# Patient Record
Sex: FEMALE | Race: WHITE | Employment: OTHER | URBAN - NONMETROPOLITAN AREA
[De-identification: names, ages, dates, MRNs, and addresses within clinical notes are randomized per-mention and may not be internally consistent; named-entity substitution may affect disease eponyms.]

---

## 2017-06-01 PROBLEM — R91.8 PULMONARY NODULES: Status: ACTIVE | Noted: 2017-06-01

## 2018-03-06 ENCOUNTER — OFFICE VISIT (OUTPATIENT)
Dept: ORTHOPEDIC SURGERY | Age: 68
End: 2018-03-06

## 2018-03-06 VITALS — HEIGHT: 62 IN | WEIGHT: 100.09 LBS | BODY MASS INDEX: 18.42 KG/M2

## 2018-03-06 DIAGNOSIS — S42.292K OTHER CLOSED DISPLACED FRACTURE OF PROXIMAL END OF LEFT HUMERUS WITH NONUNION, SUBSEQUENT ENCOUNTER: ICD-10-CM

## 2018-03-06 DIAGNOSIS — S42.492A OTHER CLOSED DISPLACED FRACTURE OF DISTAL END OF LEFT HUMERUS, INITIAL ENCOUNTER: Primary | ICD-10-CM

## 2018-03-06 PROBLEM — S42.402A CLOSED FRACTURE OF LEFT DISTAL HUMERUS: Status: ACTIVE | Noted: 2018-03-06

## 2018-03-06 PROBLEM — S42.202A CLOSED FRACTURE OF LEFT PROXIMAL HUMERUS: Status: ACTIVE | Noted: 2018-03-06

## 2018-03-06 PROCEDURE — G8400 PT W/DXA NO RESULTS DOC: HCPCS | Performed by: ORTHOPAEDIC SURGERY

## 2018-03-06 PROCEDURE — 4040F PNEUMOC VAC/ADMIN/RCVD: CPT | Performed by: ORTHOPAEDIC SURGERY

## 2018-03-06 PROCEDURE — 1090F PRES/ABSN URINE INCON ASSESS: CPT | Performed by: ORTHOPAEDIC SURGERY

## 2018-03-06 PROCEDURE — G8427 DOCREV CUR MEDS BY ELIG CLIN: HCPCS | Performed by: ORTHOPAEDIC SURGERY

## 2018-03-06 PROCEDURE — 1123F ACP DISCUSS/DSCN MKR DOCD: CPT | Performed by: ORTHOPAEDIC SURGERY

## 2018-03-06 PROCEDURE — 99203 OFFICE O/P NEW LOW 30 MIN: CPT | Performed by: ORTHOPAEDIC SURGERY

## 2018-03-06 PROCEDURE — G8484 FLU IMMUNIZE NO ADMIN: HCPCS | Performed by: ORTHOPAEDIC SURGERY

## 2018-03-06 PROCEDURE — 4004F PT TOBACCO SCREEN RCVD TLK: CPT | Performed by: ORTHOPAEDIC SURGERY

## 2018-03-06 PROCEDURE — 3017F COLORECTAL CA SCREEN DOC REV: CPT | Performed by: ORTHOPAEDIC SURGERY

## 2018-03-06 PROCEDURE — L3660 SO 8 AB RSTR CAN/WEB PRE OTS: HCPCS | Performed by: ORTHOPAEDIC SURGERY

## 2018-03-06 PROCEDURE — G8419 CALC BMI OUT NRM PARAM NOF/U: HCPCS | Performed by: ORTHOPAEDIC SURGERY

## 2018-03-06 PROCEDURE — L3980 UP EXT FX ORTHOS HUMERAL NOS: HCPCS | Performed by: ORTHOPAEDIC SURGERY

## 2018-03-06 PROCEDURE — 3014F SCREEN MAMMO DOC REV: CPT | Performed by: ORTHOPAEDIC SURGERY

## 2018-03-06 NOTE — PROGRESS NOTES
Factors: Rest  Result of Injury: Yes  Work-Related Injury: No  Are there other pain locations you wish to document?: No    Medical History:  Past Medical History:   Diagnosis Date    C. difficile diarrhea     Depression     Dysphagia     ETOH abuse     May not have had a drink in a day or so.  Failure to thrive     Gastric ulcer     GERD (gastroesophageal reflux disease)     Thyroid disease      Past Surgical History:   Procedure Laterality Date    TUBAL LIGATION       Social History     Social History    Marital status:      Spouse name: N/A    Number of children: N/A    Years of education: N/A     Social History Main Topics    Smoking status: Current Every Day Smoker     Packs/day: 1.00     Years: 45.00     Types: Cigarettes    Smokeless tobacco: Never Used    Alcohol use 3.6 oz/week     6 Shots of liquor per week    Drug use: No    Sexual activity: Not Asked      Comment: drinks 2-3 short glasses/day     Other Topics Concern    None     Social History Narrative    None     No Known Allergies    Review of Systems:  Constitutional: negative  Respiratory: negative  Cardiovascular: negative  Musculoskeletal:negative except for New Patient (Left Shoulder: DOI either 3/1 or 3/2 unsure, but slipped and fell from a standing position; seen in ER 3/3/2018  Displaced Spiral Shaft Fx of  humerus; increase swelling in hand )    Relevant review of systems reviewed and available in the patient's chart in media tab    Vital Signs:  Vitals:    03/06/18 1458   Weight: 100 lb 1.4 oz (45.4 kg)   Height: 5' 2.01\" (1.575 m)         General Exam:   Constitutional: Patient is adequately groomed with no evidence of malnutrition  Vascular: Examination reveals no swelling or calf tenderness. Peripheral pulses are palpable and 2+. Neurological: The patient has good coordination. There is no weakness or sensory deficit.     PHYSICAL EXAMINATION: Inspection of the left arm reveals warm, dry, intact skin, she does have superficial skin tears from her fall, there is no active bleeding, no evidence of open fracture. She has significant swelling into forearm and hand consistent with dependent swelling from injury. She has gross motion at the distal humerus and deformity at the proximal humerus consistent with chronic malunion. She has limited elbow flexion secondary to pain. She is able to give a thumbs up, okay sign, cross her fingers and has very weak extension at the wrist.  Sensation is intact median, ulnar and radial nerve distributions. Examination of the contralateral shoulder reveals no atrophy or deformity. The skin is warm and dry. Range of motion is within normal limits. There is no focal tenderness with palpation. Provocative SLAP, biceps tension, apprehension AC joint or rotator cuff tests are negative. Strength is graded 5/5 in all muscle groups. The distal neurovascular exam is grossly intact. Cervical spine: The skin is warm and dry. There is no swelling, warmth, or erythema. Range of motion is within normal limits. There is no paraspinal or spinous process tenderness. Spurling's sign is negative and did not produce shoulder pain. The distal neurovascular exam is grossly intact. Additional Comments: Sensation is intact to light touch throughout the median, ulnar and radial nerve distribution. Able to wiggle fingers, give thumbs up, A-OK and cross index and middle fingers. X-RAYS: 2 views of the left humerus are obtained and reviewed after application of Alberto splint, spiral comminuted distal humerus fracture is again visualized, overall no significant improvement in overall alignment. Chronic malunion the proximal humerus with 100% displacement of the humeral head noted. Assessment:  Left distal 3rd humeral shaft fracture, nonunion proximal humerus fracture    Impression:  Encounter Diagnoses   Name Primary?     Other closed displaced fracture of distal end of left humerus, Jj Cheungmiento Humeral Fracture Brace. The left Humerus will require stabilization / immobilization from this semi-rigid / rigid orthosis to improve their function. The orthosis will assist in protecting the affected area, provide functional support and facilitate healing. The patient was educated and fit by a healthcare professional with expert knowledge and specialization in brace application while under the direct supervision of the treating physician. Verbal and written instructions for the use of and application of this item were provided. They were instructed to contact the office immediately should the brace result in increased pain, decreased sensation, increased swelling or worsening of the condition.  Breg Shure Shoulder Sling     Patient was prescribed a Breg Shure Shoulder Immobilizer. The left shoulder will require stabilization / immobilization from this orthosis. The orthosis will assist in protecting the affected area, provide functional support and facilitate healing. The patient was educated and fit by a healthcare professional with expert knowledge and specialization in brace application while under the direct supervision of the treating physician. Verbal and written instructions for the use of and application of this item were provided. They were instructed to contact the office immediately should the brace result in increased pain, decreased sensation, increased swelling or worsening of the condition. Treatment Plan:  Had a lengthy discussion with the son and patient about the nature of her injury, with conservative treatment she may develop a malunion or nonunion, she will continue to have limited function throughout the left arm as she has had since her initial injury almost 10 years ago resulting in the proximal humerus fracture.   She is very low functioning, she has dementia from alcohol abuse as well as anorexia, from a surgical standpoint she is a very poor

## 2018-03-08 DIAGNOSIS — S42.342A CLOSED DISPLACED SPIRAL FRACTURE OF SHAFT OF LEFT HUMERUS, INITIAL ENCOUNTER: ICD-10-CM

## 2018-03-08 RX ORDER — HYDROCODONE BITARTRATE AND ACETAMINOPHEN 5; 325 MG/1; MG/1
1 TABLET ORAL EVERY 6 HOURS PRN
Qty: 28 TABLET | Refills: 0 | Status: SHIPPED | OUTPATIENT
Start: 2018-03-08 | End: 2018-03-15

## 2018-03-13 ENCOUNTER — OFFICE VISIT (OUTPATIENT)
Dept: ORTHOPEDIC SURGERY | Age: 68
End: 2018-03-13

## 2018-03-13 VITALS
HEIGHT: 62 IN | WEIGHT: 100 LBS | HEART RATE: 86 BPM | SYSTOLIC BLOOD PRESSURE: 130 MMHG | BODY MASS INDEX: 18.4 KG/M2 | DIASTOLIC BLOOD PRESSURE: 84 MMHG

## 2018-03-13 DIAGNOSIS — S42.292K OTHER CLOSED DISPLACED FRACTURE OF PROXIMAL END OF LEFT HUMERUS WITH NONUNION, SUBSEQUENT ENCOUNTER: ICD-10-CM

## 2018-03-13 DIAGNOSIS — S42.492D OTHER CLOSED DISPLACED FRACTURE OF DISTAL END OF LEFT HUMERUS WITH ROUTINE HEALING, SUBSEQUENT ENCOUNTER: ICD-10-CM

## 2018-03-13 DIAGNOSIS — M89.8X2 PAIN OF LEFT HUMERUS: Primary | ICD-10-CM

## 2018-03-13 PROCEDURE — 4004F PT TOBACCO SCREEN RCVD TLK: CPT | Performed by: ORTHOPAEDIC SURGERY

## 2018-03-13 PROCEDURE — G8484 FLU IMMUNIZE NO ADMIN: HCPCS | Performed by: ORTHOPAEDIC SURGERY

## 2018-03-13 PROCEDURE — G8400 PT W/DXA NO RESULTS DOC: HCPCS | Performed by: ORTHOPAEDIC SURGERY

## 2018-03-13 PROCEDURE — G8427 DOCREV CUR MEDS BY ELIG CLIN: HCPCS | Performed by: ORTHOPAEDIC SURGERY

## 2018-03-13 PROCEDURE — G8419 CALC BMI OUT NRM PARAM NOF/U: HCPCS | Performed by: ORTHOPAEDIC SURGERY

## 2018-03-13 PROCEDURE — 1090F PRES/ABSN URINE INCON ASSESS: CPT | Performed by: ORTHOPAEDIC SURGERY

## 2018-03-13 PROCEDURE — 3014F SCREEN MAMMO DOC REV: CPT | Performed by: ORTHOPAEDIC SURGERY

## 2018-03-13 PROCEDURE — 4040F PNEUMOC VAC/ADMIN/RCVD: CPT | Performed by: ORTHOPAEDIC SURGERY

## 2018-03-13 PROCEDURE — 1123F ACP DISCUSS/DSCN MKR DOCD: CPT | Performed by: ORTHOPAEDIC SURGERY

## 2018-03-13 PROCEDURE — 99213 OFFICE O/P EST LOW 20 MIN: CPT | Performed by: ORTHOPAEDIC SURGERY

## 2018-03-13 PROCEDURE — 3017F COLORECTAL CA SCREEN DOC REV: CPT | Performed by: ORTHOPAEDIC SURGERY

## 2018-03-13 NOTE — PROGRESS NOTES
Chief Complaint:  Follow-up (xr check left humerus shaft fx)      SUBJECTIVE:  Christina Church is a 74 y.o. female who returns today for evaluation of left distal humerus fracture, states the swelling and pain have improved, continues to have 7 out of 10 pain. He is wearing the Alberto brace for patient and son she has not removed this since I placed it last week. She wears her sling intermittently for comfort. Pain Assessment:  Pain Assessment  Location of Pain: Arm  Location Modifiers: Left  Severity of Pain: 7  Quality of Pain: Throbbing, Sharp  Duration of Pain: Persistent  Frequency of Pain: Intermittent      OBJECTIVE:  Vital Signs:  Vitals:    03/13/18 0831   BP: 130/84   Pulse: 86   Weight: 100 lb (45.4 kg)   Height: 5' 2\" (1.575 m)       Appearance: alert, well appearing, and in no distress, oriented to person, place, and time and normal appearing weight. Physical exam:   Physical exam of the left arm shows significant ecchymoses throughout the is still humerus, elbow, forearm and hand and wrist, sensation is intact over median, radial and ulnar distributions, motor function is intact to median, radial and ulnar distributions. She is able to give a thumbs up and extend the wrist.  Skin of the distal humerus was examined, there is no evidence of laceration, open fracture or tenting of the skin. Range of motion at the elbow and shoulder were not assessed. X-ray: 2 views of the left distal humerus were obtained and reviewed and show continued displacement of distal 3rd humerus fracture, no evidence of callus formation, nonunion and malunion of the proximal humerus fracture is again visualized, unchanged    Assessment :  Left distal humerus fracture, left proximal humerus fracture with nonunion    Impression:  Encounter Diagnoses   Name Primary?     Pain of left humerus Yes    Other closed displaced fracture of distal end of left humerus with routine healing, subsequent encounter     Other closed displaced fracture of proximal end of left humerus with nonunion, subsequent encounter        Office Procedures:  Orders Placed This Encounter   Procedures    XR HUMERUS LEFT (MIN 2 VIEWS)     24441     Order Specific Question:   Reason for exam:     Answer:   Pain     No orders of the defined types were placed in this encounter. Treatment Plan: At this point her pain and swelling are improving, Alberto appears to be fitting appropriately, we will continue with conservative treatment, follow-up in 2 weeks for AP and lateral x-ray of the left humerus, remain in Alberto brace at all times, may be removed for bathing, continue to use a sling for comfort. Patient agrees with this plan, all of their questions were answered best of our ability and to their satisfaction.         Lalo Davis

## 2018-03-27 ENCOUNTER — OFFICE VISIT (OUTPATIENT)
Dept: ORTHOPEDIC SURGERY | Age: 68
End: 2018-03-27

## 2018-03-27 VITALS
DIASTOLIC BLOOD PRESSURE: 80 MMHG | HEIGHT: 62 IN | SYSTOLIC BLOOD PRESSURE: 128 MMHG | HEART RATE: 92 BPM | BODY MASS INDEX: 18.42 KG/M2 | WEIGHT: 100.09 LBS

## 2018-03-27 DIAGNOSIS — S42.292P OTHER CLOSED DISPLACED FRACTURE OF PROXIMAL END OF LEFT HUMERUS WITH MALUNION, SUBSEQUENT ENCOUNTER: ICD-10-CM

## 2018-03-27 DIAGNOSIS — M79.602 ARM PAIN, LEFT: Primary | ICD-10-CM

## 2018-03-27 DIAGNOSIS — S42.492D OTHER CLOSED DISPLACED FRACTURE OF DISTAL END OF LEFT HUMERUS WITH ROUTINE HEALING, SUBSEQUENT ENCOUNTER: ICD-10-CM

## 2018-03-27 PROCEDURE — G8427 DOCREV CUR MEDS BY ELIG CLIN: HCPCS | Performed by: ORTHOPAEDIC SURGERY

## 2018-03-27 PROCEDURE — 3014F SCREEN MAMMO DOC REV: CPT | Performed by: ORTHOPAEDIC SURGERY

## 2018-03-27 PROCEDURE — G8484 FLU IMMUNIZE NO ADMIN: HCPCS | Performed by: ORTHOPAEDIC SURGERY

## 2018-03-27 PROCEDURE — 4004F PT TOBACCO SCREEN RCVD TLK: CPT | Performed by: ORTHOPAEDIC SURGERY

## 2018-03-27 PROCEDURE — 3017F COLORECTAL CA SCREEN DOC REV: CPT | Performed by: ORTHOPAEDIC SURGERY

## 2018-03-27 PROCEDURE — 4040F PNEUMOC VAC/ADMIN/RCVD: CPT | Performed by: ORTHOPAEDIC SURGERY

## 2018-03-27 PROCEDURE — G8400 PT W/DXA NO RESULTS DOC: HCPCS | Performed by: ORTHOPAEDIC SURGERY

## 2018-03-27 PROCEDURE — G8419 CALC BMI OUT NRM PARAM NOF/U: HCPCS | Performed by: ORTHOPAEDIC SURGERY

## 2018-03-27 PROCEDURE — 1123F ACP DISCUSS/DSCN MKR DOCD: CPT | Performed by: ORTHOPAEDIC SURGERY

## 2018-03-27 PROCEDURE — 99213 OFFICE O/P EST LOW 20 MIN: CPT | Performed by: ORTHOPAEDIC SURGERY

## 2018-03-27 PROCEDURE — 1090F PRES/ABSN URINE INCON ASSESS: CPT | Performed by: ORTHOPAEDIC SURGERY

## 2018-03-29 DIAGNOSIS — S42.492A OTHER CLOSED DISPLACED FRACTURE OF DISTAL END OF LEFT HUMERUS, INITIAL ENCOUNTER: Primary | ICD-10-CM

## 2018-03-29 RX ORDER — TRAMADOL HYDROCHLORIDE 50 MG/1
50 TABLET ORAL EVERY 6 HOURS PRN
Qty: 28 TABLET | Refills: 0 | Status: SHIPPED | OUTPATIENT
Start: 2018-03-29 | End: 2018-04-05

## 2018-04-24 ENCOUNTER — OFFICE VISIT (OUTPATIENT)
Dept: ORTHOPEDIC SURGERY | Age: 68
End: 2018-04-24

## 2018-04-24 VITALS — BODY MASS INDEX: 17.48 KG/M2 | HEIGHT: 62 IN | WEIGHT: 95 LBS

## 2018-04-24 DIAGNOSIS — S42.292D OTHER CLOSED DISPLACED FRACTURE OF PROXIMAL END OF LEFT HUMERUS WITH ROUTINE HEALING, SUBSEQUENT ENCOUNTER: ICD-10-CM

## 2018-04-24 DIAGNOSIS — S42.492D OTHER CLOSED DISPLACED FRACTURE OF DISTAL END OF LEFT HUMERUS WITH ROUTINE HEALING, SUBSEQUENT ENCOUNTER: ICD-10-CM

## 2018-04-24 DIAGNOSIS — M89.8X2 PAIN OF LEFT HUMERUS: Primary | ICD-10-CM

## 2018-04-24 PROCEDURE — G8419 CALC BMI OUT NRM PARAM NOF/U: HCPCS | Performed by: ORTHOPAEDIC SURGERY

## 2018-04-24 PROCEDURE — 1123F ACP DISCUSS/DSCN MKR DOCD: CPT | Performed by: ORTHOPAEDIC SURGERY

## 2018-04-24 PROCEDURE — 4040F PNEUMOC VAC/ADMIN/RCVD: CPT | Performed by: ORTHOPAEDIC SURGERY

## 2018-04-24 PROCEDURE — G8400 PT W/DXA NO RESULTS DOC: HCPCS | Performed by: ORTHOPAEDIC SURGERY

## 2018-04-24 PROCEDURE — 3017F COLORECTAL CA SCREEN DOC REV: CPT | Performed by: ORTHOPAEDIC SURGERY

## 2018-04-24 PROCEDURE — 4004F PT TOBACCO SCREEN RCVD TLK: CPT | Performed by: ORTHOPAEDIC SURGERY

## 2018-04-24 PROCEDURE — 1090F PRES/ABSN URINE INCON ASSESS: CPT | Performed by: ORTHOPAEDIC SURGERY

## 2018-04-24 PROCEDURE — 99213 OFFICE O/P EST LOW 20 MIN: CPT | Performed by: ORTHOPAEDIC SURGERY

## 2018-04-24 PROCEDURE — G8427 DOCREV CUR MEDS BY ELIG CLIN: HCPCS | Performed by: ORTHOPAEDIC SURGERY

## 2018-06-05 ENCOUNTER — OFFICE VISIT (OUTPATIENT)
Dept: ORTHOPEDIC SURGERY | Age: 68
End: 2018-06-05

## 2018-06-05 VITALS
SYSTOLIC BLOOD PRESSURE: 115 MMHG | BODY MASS INDEX: 18.77 KG/M2 | WEIGHT: 102 LBS | HEART RATE: 60 BPM | DIASTOLIC BLOOD PRESSURE: 70 MMHG | HEIGHT: 62 IN

## 2018-06-05 DIAGNOSIS — S42.292D OTHER CLOSED DISPLACED FRACTURE OF PROXIMAL END OF LEFT HUMERUS WITH ROUTINE HEALING, SUBSEQUENT ENCOUNTER: ICD-10-CM

## 2018-06-05 DIAGNOSIS — S42.492D OTHER CLOSED DISPLACED FRACTURE OF DISTAL END OF LEFT HUMERUS WITH ROUTINE HEALING, SUBSEQUENT ENCOUNTER: ICD-10-CM

## 2018-06-05 DIAGNOSIS — M79.602 ARM PAIN, LEFT: Primary | ICD-10-CM

## 2018-06-05 PROCEDURE — G8420 CALC BMI NORM PARAMETERS: HCPCS | Performed by: ORTHOPAEDIC SURGERY

## 2018-06-05 PROCEDURE — G8400 PT W/DXA NO RESULTS DOC: HCPCS | Performed by: ORTHOPAEDIC SURGERY

## 2018-06-05 PROCEDURE — 4040F PNEUMOC VAC/ADMIN/RCVD: CPT | Performed by: ORTHOPAEDIC SURGERY

## 2018-06-05 PROCEDURE — 1090F PRES/ABSN URINE INCON ASSESS: CPT | Performed by: ORTHOPAEDIC SURGERY

## 2018-06-05 PROCEDURE — G8427 DOCREV CUR MEDS BY ELIG CLIN: HCPCS | Performed by: ORTHOPAEDIC SURGERY

## 2018-06-05 PROCEDURE — 3017F COLORECTAL CA SCREEN DOC REV: CPT | Performed by: ORTHOPAEDIC SURGERY

## 2018-06-05 PROCEDURE — 4004F PT TOBACCO SCREEN RCVD TLK: CPT | Performed by: ORTHOPAEDIC SURGERY

## 2018-06-05 PROCEDURE — 1123F ACP DISCUSS/DSCN MKR DOCD: CPT | Performed by: ORTHOPAEDIC SURGERY

## 2018-06-05 PROCEDURE — 99213 OFFICE O/P EST LOW 20 MIN: CPT | Performed by: ORTHOPAEDIC SURGERY

## 2020-12-01 ENCOUNTER — APPOINTMENT (OUTPATIENT)
Dept: GENERAL RADIOLOGY | Age: 70
DRG: 870 | End: 2020-12-01
Payer: MEDICARE

## 2020-12-01 ENCOUNTER — APPOINTMENT (OUTPATIENT)
Dept: CT IMAGING | Age: 70
DRG: 870 | End: 2020-12-01
Payer: MEDICARE

## 2020-12-01 ENCOUNTER — HOSPITAL ENCOUNTER (INPATIENT)
Age: 70
LOS: 22 days | Discharge: NURSING, REHAB OR LONG-TERM CARE | DRG: 870 | End: 2020-12-23
Attending: EMERGENCY MEDICINE | Admitting: INTERNAL MEDICINE
Payer: MEDICARE

## 2020-12-01 PROBLEM — G93.41 ACUTE METABOLIC ENCEPHALOPATHY: Status: ACTIVE | Noted: 2020-12-01

## 2020-12-01 PROBLEM — J96.01 ACUTE RESPIRATORY FAILURE WITH HYPOXEMIA (HCC): Status: ACTIVE | Noted: 2020-12-01

## 2020-12-01 PROBLEM — J12.82 PNEUMONIA DUE TO COVID-19 VIRUS: Status: ACTIVE | Noted: 2020-12-01

## 2020-12-01 PROBLEM — U07.1 PNEUMONIA DUE TO COVID-19 VIRUS: Status: ACTIVE | Noted: 2020-12-01

## 2020-12-01 PROBLEM — J96.01 ACUTE RESPIRATORY FAILURE WITH HYPOXIA (HCC): Status: ACTIVE | Noted: 2020-12-01

## 2020-12-01 LAB
A/G RATIO: 0.8 (ref 1.1–2.2)
ALBUMIN SERPL-MCNC: 3.4 G/DL (ref 3.4–5)
ALP BLD-CCNC: 70 U/L (ref 40–129)
ALT SERPL-CCNC: 32 U/L (ref 10–40)
ANION GAP SERPL CALCULATED.3IONS-SCNC: 14 MMOL/L (ref 3–16)
ANION GAP SERPL CALCULATED.3IONS-SCNC: 8 MMOL/L (ref 3–16)
ANISOCYTOSIS: ABNORMAL
AST SERPL-CCNC: 59 U/L (ref 15–37)
BACTERIA: ABNORMAL /HPF
BANDED NEUTROPHILS RELATIVE PERCENT: 1 % (ref 0–7)
BASE EXCESS ARTERIAL: 4 MMOL/L (ref -3–3)
BASE EXCESS VENOUS: 4.7 MMOL/L (ref -3–3)
BASOPHILS ABSOLUTE: 0 K/UL (ref 0–0.2)
BASOPHILS RELATIVE PERCENT: 0 %
BILIRUB SERPL-MCNC: 0.6 MG/DL (ref 0–1)
BILIRUBIN URINE: ABNORMAL
BLOOD, URINE: NEGATIVE
BUN BLDV-MCNC: 26 MG/DL (ref 7–20)
BUN BLDV-MCNC: 51 MG/DL (ref 7–20)
CALCIUM SERPL-MCNC: 6.1 MG/DL (ref 8.3–10.6)
CALCIUM SERPL-MCNC: 9.5 MG/DL (ref 8.3–10.6)
CARBOXYHEMOGLOBIN ARTERIAL: 0.8 % (ref 0–1.5)
CARBOXYHEMOGLOBIN: 3.5 % (ref 0–1.5)
CHLORIDE BLD-SCNC: 111 MMOL/L (ref 99–110)
CHLORIDE BLD-SCNC: 96 MMOL/L (ref 99–110)
CLARITY: CLEAR
CO2: 24 MMOL/L (ref 21–32)
CO2: 29 MMOL/L (ref 21–32)
COLOR: YELLOW
CREAT SERPL-MCNC: 0.8 MG/DL (ref 0.6–1.2)
CREAT SERPL-MCNC: <0.5 MG/DL (ref 0.6–1.2)
EKG ATRIAL RATE: 111 BPM
EKG DIAGNOSIS: NORMAL
EKG P AXIS: 82 DEGREES
EKG P-R INTERVAL: 116 MS
EKG Q-T INTERVAL: 314 MS
EKG QRS DURATION: 78 MS
EKG QTC CALCULATION (BAZETT): 427 MS
EKG R AXIS: 86 DEGREES
EKG T AXIS: -64 DEGREES
EKG VENTRICULAR RATE: 111 BPM
EOSINOPHILS ABSOLUTE: 0 K/UL (ref 0–0.6)
EOSINOPHILS RELATIVE PERCENT: 0 %
EPITHELIAL CELLS, UA: ABNORMAL /HPF (ref 0–5)
ETHANOL: NORMAL MG/DL (ref 0–0.08)
FERRITIN: 20.2 NG/ML (ref 15–150)
FOLATE: 4.28 NG/ML (ref 4.78–24.2)
GFR AFRICAN AMERICAN: >60
GFR AFRICAN AMERICAN: >60
GFR NON-AFRICAN AMERICAN: >60
GFR NON-AFRICAN AMERICAN: >60
GLOBULIN: 4.3 G/DL
GLUCOSE BLD-MCNC: 123 MG/DL (ref 70–99)
GLUCOSE BLD-MCNC: 151 MG/DL (ref 70–99)
GLUCOSE BLD-MCNC: 93 MG/DL (ref 70–99)
GLUCOSE URINE: NEGATIVE MG/DL
HCO3 ARTERIAL: 26.6 MMOL/L (ref 21–29)
HCO3 VENOUS: 33 MMOL/L (ref 23–29)
HCT VFR BLD CALC: 30.1 % (ref 36–48)
HEMATOLOGY PATH CONSULT: NORMAL
HEMATOLOGY PATH CONSULT: YES
HEMOGLOBIN, ART, EXTENDED: 7.2 G/DL (ref 12–16)
HEMOGLOBIN: 8.7 G/DL (ref 12–16)
HYALINE CASTS: ABNORMAL /LPF (ref 0–2)
HYPOCHROMIA: ABNORMAL
IRON SATURATION: 7 % (ref 15–50)
IRON: 14 UG/DL (ref 37–145)
KETONES, URINE: 15 MG/DL
LACTIC ACID: 1.5 MMOL/L (ref 0.4–2)
LACTIC ACID: 2.2 MMOL/L (ref 0.4–2)
LEUKOCYTE ESTERASE, URINE: NEGATIVE
LIPASE: 27 U/L (ref 13–60)
LYMPHOCYTES ABSOLUTE: 0.5 K/UL (ref 1–5.1)
LYMPHOCYTES RELATIVE PERCENT: 3 %
MAGNESIUM: 2.7 MG/DL (ref 1.8–2.4)
MCH RBC QN AUTO: 18.8 PG (ref 26–34)
MCHC RBC AUTO-ENTMCNC: 29 G/DL (ref 31–36)
MCV RBC AUTO: 64.6 FL (ref 80–100)
METHEMOGLOBIN ARTERIAL: 0.3 %
METHEMOGLOBIN VENOUS: 0.3 %
MICROSCOPIC EXAMINATION: YES
MONOCYTES ABSOLUTE: 1.7 K/UL (ref 0–1.3)
MONOCYTES RELATIVE PERCENT: 10 %
MUCUS: ABNORMAL /LPF
MYELOCYTE PERCENT: 1 %
NEUTROPHILS ABSOLUTE: 15 K/UL (ref 1.7–7.7)
NEUTROPHILS RELATIVE PERCENT: 85 %
NITRITE, URINE: NEGATIVE
NUCLEATED RED BLOOD CELLS: 3 /100 WBC
O2 CONTENT ARTERIAL: 10 ML/DL
O2 CONTENT, VEN: 10 VOL %
O2 SAT, ARTERIAL: 96.7 %
O2 SAT, VEN: 83 %
O2 THERAPY: ABNORMAL
O2 THERAPY: ABNORMAL
PCO2 ARTERIAL: 31.2 MMHG (ref 35–45)
PCO2, VEN: 75.7 MMHG (ref 40–50)
PDW BLD-RTO: 22.4 % (ref 12.4–15.4)
PERFORMED ON: ABNORMAL
PH ARTERIAL: 7.55 (ref 7.35–7.45)
PH UA: 6 (ref 5–8)
PH VENOUS: 7.26 (ref 7.35–7.45)
PLATELET # BLD: 370 K/UL (ref 135–450)
PLATELET SLIDE REVIEW: ADEQUATE
PMV BLD AUTO: 8.4 FL (ref 5–10.5)
PO2 ARTERIAL: 75.7 MMHG (ref 75–108)
PO2, VEN: 55.5 MMHG (ref 25–40)
POIKILOCYTES: ABNORMAL
POTASSIUM REFLEX MAGNESIUM: 6.2 MMOL/L (ref 3.5–5.1)
POTASSIUM SERPL-SCNC: 3.2 MMOL/L (ref 3.5–5.1)
POTASSIUM SERPL-SCNC: 3.8 MMOL/L (ref 3.5–5.1)
PROCALCITONIN: 0.59 NG/ML (ref 0–0.15)
PROTEIN UA: ABNORMAL MG/DL
RBC # BLD: 4.66 M/UL (ref 4–5.2)
RBC UA: ABNORMAL /HPF (ref 0–4)
SARS-COV-2, NAAT: DETECTED
SLIDE REVIEW: ABNORMAL
SODIUM BLD-SCNC: 139 MMOL/L (ref 136–145)
SODIUM BLD-SCNC: 143 MMOL/L (ref 136–145)
SPECIFIC GRAVITY UA: 1.02 (ref 1–1.03)
TCO2 ARTERIAL: 27.5 MMOL/L
TCO2 CALC VENOUS: 35 MMOL/L
TOTAL IRON BINDING CAPACITY: 201 UG/DL (ref 260–445)
TOTAL PROTEIN: 7.7 G/DL (ref 6.4–8.2)
TSH REFLEX: 2.01 UIU/ML (ref 0.27–4.2)
URINE TYPE: ABNORMAL
UROBILINOGEN, URINE: 1 E.U./DL
VITAMIN B-12: 799 PG/ML (ref 211–911)
WBC # BLD: 17.2 K/UL (ref 4–11)
WBC UA: ABNORMAL /HPF (ref 0–5)

## 2020-12-01 PROCEDURE — 83540 ASSAY OF IRON: CPT

## 2020-12-01 PROCEDURE — 2000000000 HC ICU R&B

## 2020-12-01 PROCEDURE — 94002 VENT MGMT INPAT INIT DAY: CPT

## 2020-12-01 PROCEDURE — 6360000002 HC RX W HCPCS

## 2020-12-01 PROCEDURE — 99291 CRITICAL CARE FIRST HOUR: CPT | Performed by: INTERNAL MEDICINE

## 2020-12-01 PROCEDURE — 6360000002 HC RX W HCPCS: Performed by: INTERNAL MEDICINE

## 2020-12-01 PROCEDURE — C9113 INJ PANTOPRAZOLE SODIUM, VIA: HCPCS | Performed by: PHYSICIAN ASSISTANT

## 2020-12-01 PROCEDURE — 83690 ASSAY OF LIPASE: CPT

## 2020-12-01 PROCEDURE — 6360000002 HC RX W HCPCS: Performed by: PHYSICIAN ASSISTANT

## 2020-12-01 PROCEDURE — P9612 CATHETERIZE FOR URINE SPEC: HCPCS

## 2020-12-01 PROCEDURE — 93005 ELECTROCARDIOGRAM TRACING: CPT | Performed by: EMERGENCY MEDICINE

## 2020-12-01 PROCEDURE — 93010 ELECTROCARDIOGRAM REPORT: CPT | Performed by: INTERNAL MEDICINE

## 2020-12-01 PROCEDURE — 83735 ASSAY OF MAGNESIUM: CPT

## 2020-12-01 PROCEDURE — 80053 COMPREHEN METABOLIC PANEL: CPT

## 2020-12-01 PROCEDURE — 2700000000 HC OXYGEN THERAPY PER DAY

## 2020-12-01 PROCEDURE — 36600 WITHDRAWAL OF ARTERIAL BLOOD: CPT

## 2020-12-01 PROCEDURE — 6360000002 HC RX W HCPCS: Performed by: EMERGENCY MEDICINE

## 2020-12-01 PROCEDURE — 82607 VITAMIN B-12: CPT

## 2020-12-01 PROCEDURE — 2580000003 HC RX 258: Performed by: PHYSICIAN ASSISTANT

## 2020-12-01 PROCEDURE — 81001 URINALYSIS AUTO W/SCOPE: CPT

## 2020-12-01 PROCEDURE — 71045 X-RAY EXAM CHEST 1 VIEW: CPT

## 2020-12-01 PROCEDURE — 2580000003 HC RX 258: Performed by: EMERGENCY MEDICINE

## 2020-12-01 PROCEDURE — 5A1955Z RESPIRATORY VENTILATION, GREATER THAN 96 CONSECUTIVE HOURS: ICD-10-PCS | Performed by: EMERGENCY MEDICINE

## 2020-12-01 PROCEDURE — 94761 N-INVAS EAR/PLS OXIMETRY MLT: CPT

## 2020-12-01 PROCEDURE — 70450 CT HEAD/BRAIN W/O DYE: CPT

## 2020-12-01 PROCEDURE — 82746 ASSAY OF FOLIC ACID SERUM: CPT

## 2020-12-01 PROCEDURE — G0480 DRUG TEST DEF 1-7 CLASSES: HCPCS

## 2020-12-01 PROCEDURE — 96361 HYDRATE IV INFUSION ADD-ON: CPT

## 2020-12-01 PROCEDURE — 84132 ASSAY OF SERUM POTASSIUM: CPT

## 2020-12-01 PROCEDURE — 99284 EMERGENCY DEPT VISIT MOD MDM: CPT

## 2020-12-01 PROCEDURE — 0BH18EZ INSERTION OF ENDOTRACHEAL AIRWAY INTO TRACHEA, VIA NATURAL OR ARTIFICIAL OPENING ENDOSCOPIC: ICD-10-PCS | Performed by: EMERGENCY MEDICINE

## 2020-12-01 PROCEDURE — U0002 COVID-19 LAB TEST NON-CDC: HCPCS

## 2020-12-01 PROCEDURE — 99223 1ST HOSP IP/OBS HIGH 75: CPT | Performed by: PHYSICIAN ASSISTANT

## 2020-12-01 PROCEDURE — 85025 COMPLETE CBC W/AUTO DIFF WBC: CPT

## 2020-12-01 PROCEDURE — 83605 ASSAY OF LACTIC ACID: CPT

## 2020-12-01 PROCEDURE — 82728 ASSAY OF FERRITIN: CPT

## 2020-12-01 PROCEDURE — 36415 COLL VENOUS BLD VENIPUNCTURE: CPT

## 2020-12-01 PROCEDURE — 83550 IRON BINDING TEST: CPT

## 2020-12-01 PROCEDURE — 2500000003 HC RX 250 WO HCPCS

## 2020-12-01 PROCEDURE — 2500000003 HC RX 250 WO HCPCS: Performed by: EMERGENCY MEDICINE

## 2020-12-01 PROCEDURE — 82803 BLOOD GASES ANY COMBINATION: CPT

## 2020-12-01 PROCEDURE — 84145 PROCALCITONIN (PCT): CPT

## 2020-12-01 PROCEDURE — 84443 ASSAY THYROID STIM HORMONE: CPT

## 2020-12-01 PROCEDURE — 96374 THER/PROPH/DIAG INJ IV PUSH: CPT

## 2020-12-01 PROCEDURE — 6370000000 HC RX 637 (ALT 250 FOR IP): Performed by: INTERNAL MEDICINE

## 2020-12-01 PROCEDURE — 6370000000 HC RX 637 (ALT 250 FOR IP): Performed by: EMERGENCY MEDICINE

## 2020-12-01 RX ORDER — MAGNESIUM SULFATE 1 G/100ML
1 INJECTION INTRAVENOUS PRN
Status: DISCONTINUED | OUTPATIENT
Start: 2020-12-01 | End: 2020-12-23 | Stop reason: HOSPADM

## 2020-12-01 RX ORDER — DEXAMETHASONE SODIUM PHOSPHATE 10 MG/ML
4 INJECTION, SOLUTION INTRAMUSCULAR; INTRAVENOUS ONCE
Status: COMPLETED | OUTPATIENT
Start: 2020-12-01 | End: 2020-12-01

## 2020-12-01 RX ORDER — FENTANYL CITRATE 50 UG/ML
25 INJECTION, SOLUTION INTRAMUSCULAR; INTRAVENOUS
Status: DISCONTINUED | OUTPATIENT
Start: 2020-12-01 | End: 2020-12-02

## 2020-12-01 RX ORDER — SODIUM CHLORIDE 0.9 % (FLUSH) 0.9 %
10 SYRINGE (ML) INJECTION EVERY 12 HOURS SCHEDULED
Status: DISCONTINUED | OUTPATIENT
Start: 2020-12-01 | End: 2020-12-10 | Stop reason: SDUPTHER

## 2020-12-01 RX ORDER — POTASSIUM CHLORIDE 7.45 MG/ML
10 INJECTION INTRAVENOUS
Status: DISPENSED | OUTPATIENT
Start: 2020-12-01 | End: 2020-12-01

## 2020-12-01 RX ORDER — FENTANYL CITRATE 50 UG/ML
50 INJECTION, SOLUTION INTRAMUSCULAR; INTRAVENOUS
Status: DISCONTINUED | OUTPATIENT
Start: 2020-12-01 | End: 2020-12-10

## 2020-12-01 RX ORDER — SODIUM CHLORIDE 0.9 % (FLUSH) 0.9 %
10 SYRINGE (ML) INJECTION PRN
Status: DISCONTINUED | OUTPATIENT
Start: 2020-12-01 | End: 2020-12-10 | Stop reason: SDUPTHER

## 2020-12-01 RX ORDER — 0.9 % SODIUM CHLORIDE 0.9 %
1000 INTRAVENOUS SOLUTION INTRAVENOUS ONCE
Status: COMPLETED | OUTPATIENT
Start: 2020-12-01 | End: 2020-12-01

## 2020-12-01 RX ORDER — CHLORHEXIDINE GLUCONATE 0.12 MG/ML
15 RINSE ORAL 2 TIMES DAILY
Status: DISCONTINUED | OUTPATIENT
Start: 2020-12-01 | End: 2020-12-10

## 2020-12-01 RX ORDER — PANTOPRAZOLE SODIUM 40 MG/10ML
40 INJECTION, POWDER, LYOPHILIZED, FOR SOLUTION INTRAVENOUS DAILY
Status: DISCONTINUED | OUTPATIENT
Start: 2020-12-01 | End: 2020-12-02

## 2020-12-01 RX ORDER — POTASSIUM CHLORIDE 7.45 MG/ML
10 INJECTION INTRAVENOUS PRN
Status: DISCONTINUED | OUTPATIENT
Start: 2020-12-01 | End: 2020-12-23 | Stop reason: HOSPADM

## 2020-12-01 RX ORDER — ETOMIDATE 2 MG/ML
INJECTION INTRAVENOUS DAILY PRN
Status: COMPLETED | OUTPATIENT
Start: 2020-12-01 | End: 2020-12-01

## 2020-12-01 RX ORDER — POTASSIUM CHLORIDE 20 MEQ/1
40 TABLET, EXTENDED RELEASE ORAL PRN
Status: DISCONTINUED | OUTPATIENT
Start: 2020-12-01 | End: 2020-12-23 | Stop reason: HOSPADM

## 2020-12-01 RX ORDER — ACETAMINOPHEN 650 MG/1
650 SUPPOSITORY RECTAL EVERY 6 HOURS PRN
Status: DISCONTINUED | OUTPATIENT
Start: 2020-12-01 | End: 2020-12-23 | Stop reason: HOSPADM

## 2020-12-01 RX ORDER — MIDAZOLAM HYDROCHLORIDE 5 MG/ML
INJECTION INTRAMUSCULAR; INTRAVENOUS
Status: COMPLETED
Start: 2020-12-01 | End: 2020-12-01

## 2020-12-01 RX ORDER — POLYETHYLENE GLYCOL 3350 17 G/17G
17 POWDER, FOR SOLUTION ORAL DAILY PRN
Status: DISCONTINUED | OUTPATIENT
Start: 2020-12-01 | End: 2020-12-23 | Stop reason: HOSPADM

## 2020-12-01 RX ORDER — PROMETHAZINE HYDROCHLORIDE 25 MG/1
12.5 TABLET ORAL EVERY 6 HOURS PRN
Status: DISCONTINUED | OUTPATIENT
Start: 2020-12-01 | End: 2020-12-23 | Stop reason: HOSPADM

## 2020-12-01 RX ORDER — PROPOFOL 10 MG/ML
10 INJECTION, EMULSION INTRAVENOUS CONTINUOUS
Status: DISCONTINUED | OUTPATIENT
Start: 2020-12-01 | End: 2020-12-10

## 2020-12-01 RX ORDER — 0.9 % SODIUM CHLORIDE 0.9 %
500 INTRAVENOUS SOLUTION INTRAVENOUS ONCE
Status: COMPLETED | OUTPATIENT
Start: 2020-12-01 | End: 2020-12-01

## 2020-12-01 RX ORDER — ROCURONIUM BROMIDE 10 MG/ML
INJECTION, SOLUTION INTRAVENOUS DAILY PRN
Status: COMPLETED | OUTPATIENT
Start: 2020-12-01 | End: 2020-12-01

## 2020-12-01 RX ORDER — DEXTROSE MONOHYDRATE 50 MG/ML
100 INJECTION, SOLUTION INTRAVENOUS PRN
Status: DISCONTINUED | OUTPATIENT
Start: 2020-12-01 | End: 2020-12-23 | Stop reason: HOSPADM

## 2020-12-01 RX ORDER — SODIUM CHLORIDE 9 MG/ML
INJECTION, SOLUTION INTRAVENOUS CONTINUOUS
Status: DISCONTINUED | OUTPATIENT
Start: 2020-12-01 | End: 2020-12-07

## 2020-12-01 RX ORDER — MIDAZOLAM HYDROCHLORIDE 1 MG/ML
2 INJECTION INTRAMUSCULAR; INTRAVENOUS
Status: DISCONTINUED | OUTPATIENT
Start: 2020-12-01 | End: 2020-12-10

## 2020-12-01 RX ORDER — ACETAMINOPHEN 325 MG/1
650 TABLET ORAL EVERY 6 HOURS PRN
Status: DISCONTINUED | OUTPATIENT
Start: 2020-12-01 | End: 2020-12-23 | Stop reason: HOSPADM

## 2020-12-01 RX ORDER — SODIUM CHLORIDE 9 MG/ML
1000 INJECTION, SOLUTION INTRAVENOUS CONTINUOUS
Status: DISCONTINUED | OUTPATIENT
Start: 2020-12-01 | End: 2020-12-01

## 2020-12-01 RX ORDER — DEXTROSE MONOHYDRATE 25 G/50ML
12.5 INJECTION, SOLUTION INTRAVENOUS PRN
Status: DISCONTINUED | OUTPATIENT
Start: 2020-12-01 | End: 2020-12-23 | Stop reason: HOSPADM

## 2020-12-01 RX ORDER — NICOTINE POLACRILEX 4 MG
15 LOZENGE BUCCAL PRN
Status: DISCONTINUED | OUTPATIENT
Start: 2020-12-01 | End: 2020-12-23 | Stop reason: HOSPADM

## 2020-12-01 RX ORDER — ONDANSETRON 2 MG/ML
4 INJECTION INTRAMUSCULAR; INTRAVENOUS EVERY 6 HOURS PRN
Status: DISCONTINUED | OUTPATIENT
Start: 2020-12-01 | End: 2020-12-23 | Stop reason: HOSPADM

## 2020-12-01 RX ADMIN — FENTANYL CITRATE 25 MCG: 50 INJECTION, SOLUTION INTRAMUSCULAR; INTRAVENOUS at 17:11

## 2020-12-01 RX ADMIN — SODIUM CHLORIDE 500 ML: 9 INJECTION, SOLUTION INTRAVENOUS at 17:51

## 2020-12-01 RX ADMIN — ENOXAPARIN SODIUM 40 MG: 40 INJECTION SUBCUTANEOUS at 18:38

## 2020-12-01 RX ADMIN — SODIUM CHLORIDE 1000 ML: 9 INJECTION, SOLUTION INTRAVENOUS at 07:38

## 2020-12-01 RX ADMIN — CEFTRIAXONE SODIUM 1 G: 1 INJECTION, POWDER, FOR SOLUTION INTRAMUSCULAR; INTRAVENOUS at 13:59

## 2020-12-01 RX ADMIN — Medication 10 MEQ: at 21:14

## 2020-12-01 RX ADMIN — Medication 10 ML: at 21:16

## 2020-12-01 RX ADMIN — ETOMIDATE INJECTION 10 MG: 2 SOLUTION INTRAVENOUS at 14:39

## 2020-12-01 RX ADMIN — DEXTROSE MONOHYDRATE: 100 INJECTION, SOLUTION INTRAVENOUS at 08:17

## 2020-12-01 RX ADMIN — PANTOPRAZOLE SODIUM 40 MG: 40 INJECTION, POWDER, FOR SOLUTION INTRAVENOUS at 18:38

## 2020-12-01 RX ADMIN — SODIUM CHLORIDE: 9 INJECTION, SOLUTION INTRAVENOUS at 20:33

## 2020-12-01 RX ADMIN — PROPOFOL 10 MCG/KG/MIN: 10 INJECTION, EMULSION INTRAVENOUS at 15:08

## 2020-12-01 RX ADMIN — ROCURONIUM BROMIDE 40 MG: 10 INJECTION, SOLUTION INTRAVENOUS at 14:40

## 2020-12-01 RX ADMIN — DEXTROSE MONOHYDRATE 500 MG: 5 INJECTION INTRAVENOUS at 15:05

## 2020-12-01 RX ADMIN — DEXAMETHASONE SODIUM PHOSPHATE 4 MG: 10 INJECTION, SOLUTION INTRAMUSCULAR; INTRAVENOUS at 07:38

## 2020-12-01 RX ADMIN — SODIUM CHLORIDE: 9 INJECTION, SOLUTION INTRAVENOUS at 14:00

## 2020-12-01 RX ADMIN — POTASSIUM CHLORIDE 10 MEQ: 7.46 INJECTION, SOLUTION INTRAVENOUS at 18:38

## 2020-12-01 RX ADMIN — MIDAZOLAM HYDROCHLORIDE 5 MG: 5 INJECTION, SOLUTION INTRAMUSCULAR; INTRAVENOUS at 18:02

## 2020-12-01 RX ADMIN — Medication 15 ML: at 21:30

## 2020-12-01 RX ADMIN — SODIUM CHLORIDE 1000 ML: 9 INJECTION, SOLUTION INTRAVENOUS at 06:19

## 2020-12-01 RX ADMIN — POTASSIUM CHLORIDE 10 MEQ: 7.46 INJECTION, SOLUTION INTRAVENOUS at 19:57

## 2020-12-01 RX ADMIN — CALCIUM GLUCONATE 1 G: 98 INJECTION, SOLUTION INTRAVENOUS at 08:17

## 2020-12-01 ASSESSMENT — PULMONARY FUNCTION TESTS
PIF_VALUE: 32
PIF_VALUE: 28
PIF_VALUE: 32

## 2020-12-01 NOTE — H&P
 Dysphagia     ETOH abuse     May not have had a drink in a day or so.  Failure to thrive     Gastric ulcer     GERD (gastroesophageal reflux disease)     Thyroid disease        Past Surgical History:        Procedure Laterality Date    TUBAL LIGATION         Medications Prior to Admission:    Prior to Admission medications    Not on File       Allergies:  Patient has no known allergies. Social History:  The patient currently lives at home     TOBACCO:   reports that she has been smoking cigarettes. She has a 45.00 pack-year smoking history. She has never used smokeless tobacco.  ETOH:   reports current alcohol use of about 6.0 standard drinks of alcohol per week. Family History:   Positive as follows:        Problem Relation Age of Onset    Diabetes Mother     Heart Disease Father        REVIEW OF SYSTEMS:       Unable to obtain 2/2 AMS    PHYSICAL EXAM:    BP 97/60   Pulse 86   Temp 98.9 °F (37.2 °C) (Oral)   Resp 18   Ht 5' 2\" (1.575 m)   Wt 75 lb (34 kg)   SpO2 100%   BMI 13.72 kg/m²     Gen: Lethargic female seen laying on ER cot. She awakens and responds to voice, she is oriented to self only. She is cachectic and appears chronically ill. Eyes: PERRL. No sclera icterus. No conjunctival injection. ENT: No discharge. Pharynx clear. Neck: No JVD. Trachea midline. Resp: No accessory muscle use. No crackles. No wheezes. No rhonchi. CV: Regular rate. Regular rhythm. No murmur. No rub. No edema. GI: Non-tender. Non-distended. No masses. No organomegaly. Normal bowel sounds. No hernia. Skin: Warm and dry. No nodule on exposed extremities. No rash on exposed extremities. M/S: No cyanosis. No joint deformity. No clubbing. Deformity left humerus - chronic   Severe muscle wasting diffusely  Neuro: Lethargic, responds to voice, moving all extremities   Psych: Oriented to self only. No anxiety, no agitation.      CBC:   Recent Labs     12/01/20  0611   WBC 17.2*   HGB 8.7* [phone]- I called this, there was no answer, and no voicemail option to leave a message     Franki Reddy PA-C  12/1/2020 5:25 PM

## 2020-12-01 NOTE — ED PROVIDER NOTES
Emergency Physician Note  [address] [facility] ED  [address] [facility] [place]. [address]  Dept: [phone]  Loc: [phone]  Open Note Time:  6:24 AM EST    Chief Complaint  Failure To Thrive (pt arrive via EMS, ems states son called after pt has not ate in 5-6 days, pt thin in appearance, pt unable to answer questionas appropriately)       History of Present Illness  Nikky Thomson is a 78 y.o. female  has a past medical history of C. difficile diarrhea, Depression, Dysphagia, ETOH abuse, Failure to thrive, Gastric ulcer, GERD (gastroesophageal reflux disease), and Thyroid disease. who presents to the ED for failure to thrive. Patient lives with her son. She has dementia. He states 3 or 4 years ago she broke her left humerus and ever since then she has had limited motion of her left arm, and that is unchanged. He states 3 to 4 days ago she stopped eating, and yesterday she only took a little sip of water. She is not complaining of anything to her son that he believes is because she is fearful of being sent to the hospital and being admitted. Patient is a poor historian and unable to provide any history herself. Unable to assess review of systems as patient is a poor historian    Unless otherwise stated in this report or unable to obtain because of the patient's clinical or mental status as evidenced by the medical record, this patient's positive and negative responses for review of systems, constitutional, psych, eyes, ENT, cardiovascular, respiratory, gastrointestinal, neurological, genitourinary, musculoskeletal, integument systems and systems related to the presenting problem are either stated in the preceding paragraph or were not pertinent or were negative for the symptoms and/or complaints related to the medical problem.     I have reviewed the following from the nursing documentation:      Prior to Admission medications    Not on File       Allergies as of 12/01/2020    (No Known Allergies)       Past Medical History:   Diagnosis Date    C. difficile diarrhea     Depression     Dysphagia     ETOH abuse     May not have had a drink in a day or so.  Failure to thrive     Gastric ulcer     GERD (gastroesophageal reflux disease)     Thyroid disease         Surgical History:   Past Surgical History:   Procedure Laterality Date    TUBAL LIGATION          Family History:    Family History   Problem Relation Age of Onset    Diabetes Mother     Heart Disease Father        Social History     Socioeconomic History    Marital status:      Spouse name: Not on file    Number of children: Not on file    Years of education: Not on file    Highest education level: Not on file   Occupational History    Not on file   Social Needs    Financial resource strain: Not on file    Food insecurity     Worry: Not on file     Inability: Not on file    Transportation needs     Medical: Not on file     Non-medical: Not on file   Tobacco Use    Smoking status: Current Every Day Smoker     Packs/day: 1.00     Years: 45.00     Pack years: 45.00     Types: Cigarettes    Smokeless tobacco: Never Used   Substance and Sexual Activity    Alcohol use:  Yes     Alcohol/week: 6.0 standard drinks     Types: 6 Shots of liquor per week    Drug use: No    Sexual activity: Not on file     Comment: drinks 2-3 short glasses/day   Lifestyle    Physical activity     Days per week: Not on file     Minutes per session: Not on file    Stress: Not on file   Relationships    Social connections     Talks on phone: Not on file     Gets together: Not on file     Attends Holiness service: Not on file     Active member of club or organization: Not on file     Attends meetings of clubs or organizations: Not on file     Relationship status: Not on file    Intimate partner violence     Fear of current or ex partner: Not on file     Emotionally abused: Not on file     Physically abused: Not on file     Forced sexual activity: Not on file   Other Topics Concern    Not on file   Social History Narrative    Not on file       Nursing notes reviewed. ED Triage Vitals   Enc Vitals Group      BP 12/01/20 0557 91/66      Pulse 12/01/20 0606 111      Resp 12/01/20 0557 22      Temp 12/01/20 0557 98.9 °F (37.2 °C)      Temp Source 12/01/20 0557 Oral      SpO2 12/01/20 0557 91 %      Weight 12/01/20 0557 75 lb (34 kg)      Height 12/01/20 0557 5' 2\" (1.575 m)      Head Circumference --       Peak Flow --       Pain Score --       Pain Loc --       Pain Edu? --       Excl. in GC? --        GENERAL:   Body mass index is 13.72 kg/m². Awake, alert. Well developed, cachectic with no apparent distress. Nontoxic-appearing, ill-appearing. HENT:   Normocephalic, Atraumatic, no lacerations. No ENT exam due to PPE. EYES:   Conjunctiva mildly yellow,   Pupils equal round and reactive to light,   Extraocular movements normal.  NECK:  Trachea is midline. No stridor. CHEST:  Regular rate and regular rhythm, no murmurs/rubs/gallops,   normal S1/S2, chest wall non-tender. LUNGS:  No respiratory distress. No abdominal retractions, no sternal retractions. Clear to auscultation bilaterally, no wheezing, no rhochi, no rales  ABDOMEN:  Soft, non-tender, non-distended. No guarding. No rebound. Normal BS, no organomegaly, no abdominal masses  EXTREMITIES:  Moves extremities   Normal range of motion, no edema,   No tenderness, no deformity,   distal pulses present and equal bilaterally. SKIN:  Warm, dry and intact. Decreased turgor  NEUROLOGIC:  Pleasantly confused  Moving all extremities to command. Alert and unable to ascertain orientation  without focal motor deficit or gross sensory deficit.      Nonsensical speech  Generalized weakness, no focal weakness  PSYCHIATRIC:  anxious,   Not responding to internal stimuli,  responds inappropriately to questions    LABS and DIAGNOSTIC RESULTS  EKG  The Ekg gave her update. I explained to him that doing intubation in a controlled setting will give his mother her best chance of surviving. I also explained to him that after couple days if she does not improve he always has the option to withdraw care. The son also felt somewhat disappointed about the fact that he is not allowed to visit his mother, but he understands    [SG]      ED Course User Index  [SG] Rosa Severino MD       I wore  surgical mask, and gloves when I evaluated the patient.     I evaluated the patient in room 09/09    Results for orders placed or performed during the hospital encounter of 12/01/20   CBC Auto Differential   Result Value Ref Range    WBC 17.2 (H) 4.0 - 11.0 K/uL    RBC 4.66 4.00 - 5.20 M/uL    Hemoglobin 8.7 (L) 12.0 - 16.0 g/dL    Hematocrit 30.1 (L) 36.0 - 48.0 %    MCV 64.6 (L) 80.0 - 100.0 fL    MCH 18.8 (L) 26.0 - 34.0 pg    MCHC 29.0 (L) 31.0 - 36.0 g/dL    RDW 22.4 (H) 12.4 - 15.4 %    Platelets 316 382 - 460 K/uL    MPV 8.4 5.0 - 10.5 fL    PLATELET SLIDE REVIEW Adequate     SLIDE REVIEW see below     Path Consult Yes     Neutrophils % 85.0 %    Lymphocytes % 3.0 %    Monocytes % 10.0 %    Eosinophils % 0.0 %    Basophils % 0.0 %    Neutrophils Absolute 15.0 (H) 1.7 - 7.7 K/uL    Lymphocytes Absolute 0.5 (L) 1.0 - 5.1 K/uL    Monocytes Absolute 1.7 (H) 0.0 - 1.3 K/uL    Eosinophils Absolute 0.0 0.0 - 0.6 K/uL    Basophils Absolute 0.0 0.0 - 0.2 K/uL    Bands Relative 1 0 - 7 %    Myelocyte Percent 1 (A) %    nRBC 3 (A) /100 WBC    Anisocytosis 2+ (A)     Hypochromia 1+ (A)     Poikilocytes 1+ (A)    Comprehensive Metabolic Panel w/ Reflex to MG   Result Value Ref Range    Sodium 139 136 - 145 mmol/L    Potassium reflex Magnesium 6.2 (HH) 3.5 - 5.1 mmol/L    Chloride 96 (L) 99 - 110 mmol/L    CO2 29 21 - 32 mmol/L    Anion Gap 14 3 - 16    Glucose 151 (H) 70 - 99 mg/dL    BUN 51 (H) 7 - 20 mg/dL    CREATININE 0.8 0.6 - 1.2 mg/dL    GFR Non-African American >60 >60    GFR African American >60 >60    Calcium 9.5 8.3 - 10.6 mg/dL    Total Protein 7.7 6.4 - 8.2 g/dL    Alb 3.4 3.4 - 5.0 g/dL    Albumin/Globulin Ratio 0.8 (L) 1.1 - 2.2    Total Bilirubin 0.6 0.0 - 1.0 mg/dL    Alkaline Phosphatase 70 40 - 129 U/L    ALT 32 10 - 40 U/L    AST 59 (H) 15 - 37 U/L    Globulin 4.3 g/dL   Lipase   Result Value Ref Range    Lipase 27.0 13.0 - 60.0 U/L   Urinalysis, reflex to microscopic   Result Value Ref Range    Color, UA Yellow Straw/Yellow    Clarity, UA Clear Clear    Glucose, Ur Negative Negative mg/dL    Bilirubin Urine SMALL (A) Negative    Ketones, Urine 15 (A) Negative mg/dL    Specific Gravity, UA 1.020 1.005 - 1.030    Blood, Urine Negative Negative    pH, UA 6.0 5.0 - 8.0    Protein, UA TRACE (A) Negative mg/dL    Urobilinogen, Urine 1.0 <2.0 E.U./dL    Nitrite, Urine Negative Negative    Leukocyte Esterase, Urine Negative Negative    Microscopic Examination YES     Urine Type NotGiven    COVID-19   Result Value Ref Range    SARS-CoV-2, NAAT DETECTED (AA) Not Detected   Blood gas, venous   Result Value Ref Range    pH, Chance 7.257 (L) 7.350 - 7.450    pCO2, Chance 75.7 (H) 40.0 - 50.0 mmHg    pO2, Chance 55.5 (H) 25.0 - 40.0 mmHg    HCO3, Venous 33.0 (H) 23.0 - 29.0 mmol/L    Base Excess, Chance 4.7 (H) -3.0 - 3.0 mmol/L    O2 Sat, Chance 83 Not Established %    Carboxyhemoglobin 3.5 (H) 0.0 - 1.5 %    MetHgb, Chance 0.3 <1.5 %    TC02 (Calc), Chance 35 Not Established mmol/L    O2 Content, Chance 10 Not Established VOL %    O2 Therapy Unknown    EKG 12 Lead   Result Value Ref Range    Ventricular Rate 111 BPM    Atrial Rate 111 BPM    P-R Interval 116 ms    QRS Duration 78 ms    Q-T Interval 314 ms    QTc Calculation (Bazett) 427 ms    P Axis 82 degrees    R Axis 86 degrees    T Axis -64 degrees    Diagnosis       Sinus tachycardiaPossible Left atrial enlargementNonspecific ST and T wave abnormalityAbnormal ECGNo previous ECGs available       Discussed the hyperkalemia with Dr. Darlin Riley, he recommends fluids and repeating the potassium. At this point he does not recommend treating with insulin/dextrose and calcium gluconate. I spoke with Jeni Hathaway, NP. We thoroughly discussed the history, physical exam, laboratory and imaging studies, as well as, emergency department course. Based upon that discussion, we've decided to admit Denise Anderson for further observation and evaluation of Merced Motta's mental status change. As I have deemed necessary from their history, physical and studies, I have considered and evaluated Denise Anderson for the following diagnoses:    Hypoxemia/ischemic encephalopathy, hepatic encephalopathy   Seizure or postictal state   Alterations of glucose such as hypoglycemia and hyperglycemia    Alterations in perfusions such as hypotension and hypoperfusion    Alterations in electrolytes such as disturbances in sodium or calcium   Infectious processes such as sepsis from a pneumonia or urinary tract infection    Substance use or withdrawal, especially alcohol and drugs    Medication adverse event or interaction    Vitamin deficiencies such as Wernicke's encephalopathy    CNS lesion, injury, infection (CVA, subdural hematoma, meningitis, encephalitis)    Alterations in hormones such as thyroid or adrenal abnormalities    Alterations in cardiac functioning such as arrhythmia, MI or CHF    Alteration in temperature such as hyperthermia or hypothermia    Dehydration, sleep deprivation   Change in medical regimen    Alteration in lifestyle, environment, or personal relationships        FINAL IMPRESSION  1. Acute respiratory failure with hypoxia ([address]    2. Anorexia    3. Tachycardia    4. Failure to thrive in adult    5. COVID-19 virus detected    6. Hyperkalemia        Vitals:  Blood pressure 114/60, pulse 98, temperature 98.9 °F (37.2 °C), temperature source Oral, resp. rate 22, height 5' 2\" (1.575 m), weight 75 lb (34 kg), SpO2 95 %.       Disposition    Pt is in stable condition upon Admit to [facility]. Please note, critical care time was at least 90 minutes, obtaining history, conducting a physical exam, performing and monitoring interventions, ordering, collecting and interpreting tests, and establishing medical decision-making and discussion with the patient and/or family, specifically for management of the presenting complaint and symptoms initially, direct bedside care, reevaluation, review of records, and consultation. There was a high probability of clinically significant life-threatening deterioration in the patient's condition, which required my urgent intervention. This time does not include separately billable procedures. The note was completed using Dragon voice recognition transcription. Every effort was made to ensure accuracy; however, inadvertent transcription errors may be present despite my best efforts to edit errors.     Becka Salguero MD  [address] [facility], MD  12/01/20 1700 [facility], MD  12/01/20 [address]/[facility], MD  12/01/20 [address] [place] [address] Stephanie Hong MD  12/01/20 Esther Wagner MD  12/01/20 [facility] Annabelle Hong MD  12/02/20 [id number]

## 2020-12-01 NOTE — ED NOTES
Received call from Kimi Bernal in [facility]. A [place] medic who transported pt to [facility] ED contacted [facility]. I advised [facility] we can not give any information over the phone, maybe she should call the PCP for information.       Jackie Nielson  12/01/20 6620

## 2020-12-01 NOTE — ED NOTES
Attempted straight cath at this time with no success. MD notified.       Roslyn Brandt, RN  12/01/20 3011

## 2020-12-01 NOTE — CARE COORDINATION
Attempted to reach pt son, Carson Grey, for initial CM interview and assessment without success. No answer and no voicemail set up. Will attempt to reach at later time.

## 2020-12-01 NOTE — CONSULTS
Patient is being seen at the request of Maddie Espinosa for a consultation for Acute respiratory failure with hypoxemia/Covid 19    HISTORY OF PRESENT ILLNESS: The patient is a 73-year-old woman with a past medical history of alcohol abuse, gastroesophageal reflux disease, gastric ulcer and dementia who presented to [facility] ER via EMS after patient's family called for failure to thrive and patient being less responsive. Patient reportedly lives with her son and has dementia. Approximately 3 to 4 days ago her son noted the patient stopped eating and she was taking less liquids. It was noted that the patient is reluctant to come to the hospital and is a very poor historian. In the emergency department the patient underwent chest radiograph revealing bilateral airspace opacities worse in the right lower lung zone. In addition she was found to be hypoxemic and required 6 L of nasal cannula oxygenation. ER physician Dr. Misti Lindsay reached out to patient's son Janus Severe, who stated that he wished his mother to be full code. Patient is agitated during my interview and is unable provide any history. PAST MEDICAL HISTORY:  Past Medical History:   Diagnosis Date    C. difficile diarrhea     COVID-19 12/01/2020    Depression     Dysphagia     ETOH abuse     May not have had a drink in a day or so.  Failure to thrive     Gastric ulcer     GERD (gastroesophageal reflux disease)     Thyroid disease      PAST SURGICAL HISTORY:  Past Surgical History:   Procedure Laterality Date    TUBAL LIGATION         FAMILY HISTORY:  family history includes Diabetes in her mother; Heart Disease in her father. SOCIAL HISTORY:   reports that she has been smoking cigarettes. She has a 45.00 pack-year smoking history. She has never used smokeless tobacco.    Scheduled Meds:    Continuous Infusions:   sodium chloride 1,000 mL (12/01/20 0738)     PRN Meds:      ALLERGIES:  Patient has No Known Allergies.     REVIEW OF SYSTEMS:  Unobtainable secondary to encephalopathy    PHYSICAL EXAM:  Blood pressure 97/60, pulse 86, temperature 98.9 °F (37.2 °C), temperature source Oral, resp. rate 18, height 5' 2\" (1.575 m), weight 75 lb (34 kg), SpO2 100 %.' on 4L NC  Gen: mild distress. Cachetic. Eyes: PERRL. No sclera icterus. No conjunctival injection. ENT: No discharge. Pharynx clear. Neck: Trachea midline. No obvious mass. Resp:+ accessory muscle use. No crackles. No wheezes. No rhonchi. No dullness on percussion. CV: Regular rate. Regular rhythm. No murmur or rub. No edema. GI: Non-tender. Non-distended. No hernia. Skin: Warm and dry. No nodule on exposed extremities. Lymph: No cervical LAD. No supraclavicular LAD. M/S: No cyanosis. No joint deformity. No clubbing. Neuro: Awake. Alert. Moves all four extremities. Agitated and does not follow commands. LABS:  CBC:   Recent Labs     12/01/20  0611   WBC 17.2*   HGB 8.7*   HCT 30.1*   MCV 64.6*        BMP:   Recent Labs     12/01/20  0611 12/01/20  0945     --    K 6.2* 3.8   CL 96*  --    CO2 29  --    BUN 51*  --    CREATININE 0.8  --      LIVER PROFILE:   Recent Labs     12/01/20  0611   AST 59*   ALT 32   LIPASE 27.0   BILITOT 0.6   ALKPHOS 70     PT/INR: No results for input(s): PROTIME, INR in the last 72 hours. APTT: No results for input(s): APTT in the last 72 hours. UA:  Recent Labs     12/01/20  0755   COLORU Yellow   PHUR 6.0   WBCUA 3-5   RBCUA 0-2   MUCUS Rare*   BACTERIA 1+*   CLARITYU Clear   SPECGRAV 1.020   LEUKOCYTESUR Negative   UROBILINOGEN 1.0   BILIRUBINUR SMALL*   BLOODU Negative   GLUCOSEU Negative     No results for input(s): PHART, OSV3GJX, PO2ART in the last 72 hours.     Chest imaging was reviewed by me and showed :  CXR: Scattered bilateral hazy airspace opacities worse in the right lower lobe    ASSESSMENT:  · Acute respiratory failure with hypoxemia  · COVID-19 pneumonia right lower lobe  · Dehydration  · Hyperkalemia  · Cachexia/failure to thrive  · Elevated liver function test/AST  · Acute metabolic encephalopathy superimposed on underlying dementia    PLAN:  · Supplemental oxygen to maintain SaO2 >92%; wean as tolerated   · I discussed case with Dr. Aguila Rowland in emergency department. Patient is agitated and unwilling to keep her oxygen via nasal cannula on. She desaturates into the 70s quickly. Plan for endotracheal intubation per Dr. Aguila Rowland and placement on mechanical ventilation. · Low tidal volume ventilation as per ARDSnet  · Propofol drip for sedation target RASS -2  · Daily SAT/SBT  · IV Decadron 6 mg daily  · Hold on remdesivir unless she clinically worsens  · Hold on convalescent plasma unless she clinically worsens  · CTX and azithromycin  · F/u cultures  · Full code per chart  · ICU care      Patient is critically ill with acute respiratory failure. Critical care time spent reviewing labs/films, examining patient, collaborating with other physicians but excluding procedures for life threatening organ failure is 35 minutes. Thank you for the consult.

## 2020-12-01 NOTE — ED NOTES
Spoke with Dr. Michelle Harris regarding additional medications d/t increased movement on vent. 2mg versed IV QH PRN, 50mg fentantyl IV QH PRN per telephone order from Dr. Michelle Harris.       Eleuterio Sinclair RN  12/01/20 6587

## 2020-12-01 NOTE — ED NOTES
1239 ~ call placed to Pulmonology for Margo/Dr. Candis Antony. 1410 ~ Dr. Vester Dakin in the dept at this time.      Rodolfo Seymour  12/01/20 3670

## 2020-12-02 ENCOUNTER — APPOINTMENT (OUTPATIENT)
Dept: GENERAL RADIOLOGY | Age: 70
DRG: 870 | End: 2020-12-02
Payer: MEDICARE

## 2020-12-02 PROBLEM — E43 SEVERE PROTEIN-CALORIE MALNUTRITION (HCC): Status: ACTIVE | Noted: 2020-12-02

## 2020-12-02 LAB
A/G RATIO: 0.8 (ref 1.1–2.2)
ABO/RH: NORMAL
ALBUMIN SERPL-MCNC: 2.6 G/DL (ref 3.4–5)
ALP BLD-CCNC: 79 U/L (ref 40–129)
ALT SERPL-CCNC: 22 U/L (ref 10–40)
ANION GAP SERPL CALCULATED.3IONS-SCNC: 13 MMOL/L (ref 3–16)
ANTIBODY SCREEN: NORMAL
AST SERPL-CCNC: 37 U/L (ref 15–37)
BASE EXCESS ARTERIAL: -3.2 MMOL/L (ref -3–3)
BASOPHILS ABSOLUTE: 0 K/UL (ref 0–0.2)
BASOPHILS RELATIVE PERCENT: 0.3 %
BILIRUB SERPL-MCNC: 0.3 MG/DL (ref 0–1)
BLOOD BANK DISPENSE STATUS: NORMAL
BLOOD BANK PRODUCT CODE: NORMAL
BPU ID: NORMAL
BUN BLDV-MCNC: 29 MG/DL (ref 7–20)
CALCIUM SERPL-MCNC: 8.2 MG/DL (ref 8.3–10.6)
CARBOXYHEMOGLOBIN ARTERIAL: 1.1 % (ref 0–1.5)
CHLORIDE BLD-SCNC: 105 MMOL/L (ref 99–110)
CO2: 23 MMOL/L (ref 21–32)
CREAT SERPL-MCNC: <0.5 MG/DL (ref 0.6–1.2)
DESCRIPTION BLOOD BANK: NORMAL
EOSINOPHILS ABSOLUTE: 0 K/UL (ref 0–0.6)
EOSINOPHILS RELATIVE PERCENT: 0 %
GFR AFRICAN AMERICAN: >60
GFR NON-AFRICAN AMERICAN: >60
GLOBULIN: 3.1 G/DL
GLUCOSE BLD-MCNC: 101 MG/DL (ref 70–99)
GLUCOSE BLD-MCNC: 108 MG/DL (ref 70–99)
GLUCOSE BLD-MCNC: 183 MG/DL (ref 70–99)
GLUCOSE BLD-MCNC: 242 MG/DL (ref 70–99)
GLUCOSE BLD-MCNC: 88 MG/DL (ref 70–99)
HCO3 ARTERIAL: 20.9 MMOL/L (ref 21–29)
HCT VFR BLD CALC: 22 % (ref 36–48)
HCT VFR BLD CALC: 22.3 % (ref 36–48)
HCT VFR BLD CALC: 27 % (ref 36–48)
HEMOGLOBIN, ART, EXTENDED: 7.1 G/DL (ref 12–16)
HEMOGLOBIN: 6.4 G/DL (ref 12–16)
HEMOGLOBIN: 6.5 G/DL (ref 12–16)
HEMOGLOBIN: 8.3 G/DL (ref 12–16)
LYMPHOCYTES ABSOLUTE: 0.6 K/UL (ref 1–5.1)
LYMPHOCYTES RELATIVE PERCENT: 6.4 %
MCH RBC QN AUTO: 18.8 PG (ref 26–34)
MCHC RBC AUTO-ENTMCNC: 29.3 G/DL (ref 31–36)
MCV RBC AUTO: 64 FL (ref 80–100)
METHEMOGLOBIN ARTERIAL: 0.3 %
MONOCYTES ABSOLUTE: 0.7 K/UL (ref 0–1.3)
MONOCYTES RELATIVE PERCENT: 7.2 %
NEUTROPHILS ABSOLUTE: 8.1 K/UL (ref 1.7–7.7)
NEUTROPHILS RELATIVE PERCENT: 86.1 %
O2 CONTENT ARTERIAL: 9 ML/DL
O2 SAT, ARTERIAL: 91.9 %
O2 THERAPY: ABNORMAL
PCO2 ARTERIAL: 33 MMHG (ref 35–45)
PDW BLD-RTO: 22.3 % (ref 12.4–15.4)
PERFORMED ON: ABNORMAL
PH ARTERIAL: 7.42 (ref 7.35–7.45)
PLATELET # BLD: 215 K/UL (ref 135–450)
PMV BLD AUTO: 8.2 FL (ref 5–10.5)
PO2 ARTERIAL: 60.2 MMHG (ref 75–108)
POTASSIUM REFLEX MAGNESIUM: 5 MMOL/L (ref 3.5–5.1)
RBC # BLD: 3.43 M/UL (ref 4–5.2)
SODIUM BLD-SCNC: 141 MMOL/L (ref 136–145)
TCO2 ARTERIAL: 21.9 MMOL/L
TOTAL PROTEIN: 5.7 G/DL (ref 6.4–8.2)
WBC # BLD: 9.4 K/UL (ref 4–11)

## 2020-12-02 PROCEDURE — P9016 RBC LEUKOCYTES REDUCED: HCPCS

## 2020-12-02 PROCEDURE — 6360000002 HC RX W HCPCS: Performed by: PHYSICIAN ASSISTANT

## 2020-12-02 PROCEDURE — 2500000003 HC RX 250 WO HCPCS: Performed by: INTERNAL MEDICINE

## 2020-12-02 PROCEDURE — 87086 URINE CULTURE/COLONY COUNT: CPT

## 2020-12-02 PROCEDURE — 80053 COMPREHEN METABOLIC PANEL: CPT

## 2020-12-02 PROCEDURE — 86901 BLOOD TYPING SEROLOGIC RH(D): CPT

## 2020-12-02 PROCEDURE — 94003 VENT MGMT INPAT SUBQ DAY: CPT

## 2020-12-02 PROCEDURE — 86923 COMPATIBILITY TEST ELECTRIC: CPT

## 2020-12-02 PROCEDURE — 6370000000 HC RX 637 (ALT 250 FOR IP): Performed by: INTERNAL MEDICINE

## 2020-12-02 PROCEDURE — C9113 INJ PANTOPRAZOLE SODIUM, VIA: HCPCS | Performed by: PHYSICIAN ASSISTANT

## 2020-12-02 PROCEDURE — 36415 COLL VENOUS BLD VENIPUNCTURE: CPT

## 2020-12-02 PROCEDURE — 94750 HC PULMONARY COMPLIANCE STUDY: CPT

## 2020-12-02 PROCEDURE — 87205 SMEAR GRAM STAIN: CPT

## 2020-12-02 PROCEDURE — XW13325 TRANSFUSION OF CONVALESCENT PLASMA (NONAUTOLOGOUS) INTO PERIPHERAL VEIN, PERCUTANEOUS APPROACH, NEW TECHNOLOGY GROUP 5: ICD-10-PCS | Performed by: INTERNAL MEDICINE

## 2020-12-02 PROCEDURE — 82803 BLOOD GASES ANY COMBINATION: CPT

## 2020-12-02 PROCEDURE — 2580000003 HC RX 258: Performed by: INTERNAL MEDICINE

## 2020-12-02 PROCEDURE — 86900 BLOOD TYPING SEROLOGIC ABO: CPT

## 2020-12-02 PROCEDURE — XW033E5 INTRODUCTION OF REMDESIVIR ANTI-INFECTIVE INTO PERIPHERAL VEIN, PERCUTANEOUS APPROACH, NEW TECHNOLOGY GROUP 5: ICD-10-PCS | Performed by: INTERNAL MEDICINE

## 2020-12-02 PROCEDURE — 2700000000 HC OXYGEN THERAPY PER DAY

## 2020-12-02 PROCEDURE — 85018 HEMOGLOBIN: CPT

## 2020-12-02 PROCEDURE — 86850 RBC ANTIBODY SCREEN: CPT

## 2020-12-02 PROCEDURE — 71045 X-RAY EXAM CHEST 1 VIEW: CPT

## 2020-12-02 PROCEDURE — 99291 CRITICAL CARE FIRST HOUR: CPT | Performed by: INTERNAL MEDICINE

## 2020-12-02 PROCEDURE — 94761 N-INVAS EAR/PLS OXIMETRY MLT: CPT

## 2020-12-02 PROCEDURE — 2000000000 HC ICU R&B

## 2020-12-02 PROCEDURE — 6360000002 HC RX W HCPCS: Performed by: INTERNAL MEDICINE

## 2020-12-02 PROCEDURE — 87070 CULTURE OTHR SPECIMN AEROBIC: CPT

## 2020-12-02 PROCEDURE — 36430 TRANSFUSION BLD/BLD COMPNT: CPT

## 2020-12-02 PROCEDURE — C9113 INJ PANTOPRAZOLE SODIUM, VIA: HCPCS | Performed by: INTERNAL MEDICINE

## 2020-12-02 PROCEDURE — 85014 HEMATOCRIT: CPT

## 2020-12-02 PROCEDURE — 99233 SBSQ HOSP IP/OBS HIGH 50: CPT | Performed by: INTERNAL MEDICINE

## 2020-12-02 PROCEDURE — 2580000003 HC RX 258: Performed by: PHYSICIAN ASSISTANT

## 2020-12-02 PROCEDURE — 85025 COMPLETE CBC W/AUTO DIFF WBC: CPT

## 2020-12-02 RX ORDER — FOLIC ACID 1 MG/1
1 TABLET ORAL DAILY
Status: DISCONTINUED | OUTPATIENT
Start: 2020-12-03 | End: 2020-12-23 | Stop reason: HOSPADM

## 2020-12-02 RX ORDER — DEXAMETHASONE SODIUM PHOSPHATE 10 MG/ML
6 INJECTION, SOLUTION INTRAMUSCULAR; INTRAVENOUS DAILY
Status: DISCONTINUED | OUTPATIENT
Start: 2020-12-02 | End: 2020-12-10

## 2020-12-02 RX ORDER — 0.9 % SODIUM CHLORIDE 0.9 %
250 INTRAVENOUS SOLUTION INTRAVENOUS ONCE
Status: COMPLETED | OUTPATIENT
Start: 2020-12-02 | End: 2020-12-02

## 2020-12-02 RX ORDER — 0.9 % SODIUM CHLORIDE 0.9 %
20 INTRAVENOUS SOLUTION INTRAVENOUS ONCE
Status: DISCONTINUED | OUTPATIENT
Start: 2020-12-02 | End: 2020-12-15

## 2020-12-02 RX ORDER — 0.9 % SODIUM CHLORIDE 0.9 %
20 INTRAVENOUS SOLUTION INTRAVENOUS ONCE
Status: COMPLETED | OUTPATIENT
Start: 2020-12-02 | End: 2020-12-02

## 2020-12-02 RX ORDER — PANTOPRAZOLE SODIUM 40 MG/10ML
40 INJECTION, POWDER, LYOPHILIZED, FOR SOLUTION INTRAVENOUS 2 TIMES DAILY
Status: DISCONTINUED | OUTPATIENT
Start: 2020-12-02 | End: 2020-12-11

## 2020-12-02 RX ADMIN — MUPIROCIN: 20 OINTMENT TOPICAL at 20:23

## 2020-12-02 RX ADMIN — Medication 10 ML: at 08:29

## 2020-12-02 RX ADMIN — Medication 10 ML: at 20:23

## 2020-12-02 RX ADMIN — PANTOPRAZOLE SODIUM 40 MG: 40 INJECTION, POWDER, FOR SOLUTION INTRAVENOUS at 20:23

## 2020-12-02 RX ADMIN — PROPOFOL 70 MCG/KG/MIN: 10 INJECTION, EMULSION INTRAVENOUS at 18:25

## 2020-12-02 RX ADMIN — THIAMINE HYDROCHLORIDE: 100 INJECTION, SOLUTION INTRAMUSCULAR; INTRAVENOUS at 13:03

## 2020-12-02 RX ADMIN — SODIUM CHLORIDE 20 ML: 9 INJECTION, SOLUTION INTRAVENOUS at 10:43

## 2020-12-02 RX ADMIN — MUPIROCIN: 20 OINTMENT TOPICAL at 08:29

## 2020-12-02 RX ADMIN — Medication 15 ML: at 20:23

## 2020-12-02 RX ADMIN — PANTOPRAZOLE SODIUM 40 MG: 40 INJECTION, POWDER, FOR SOLUTION INTRAVENOUS at 08:29

## 2020-12-02 RX ADMIN — SODIUM CHLORIDE 250 ML: 9 INJECTION, SOLUTION INTRAVENOUS at 13:04

## 2020-12-02 RX ADMIN — PROPOFOL 60 MCG/KG/MIN: 10 INJECTION, EMULSION INTRAVENOUS at 08:44

## 2020-12-02 RX ADMIN — REMDESIVIR 200 MG: 100 INJECTION, POWDER, LYOPHILIZED, FOR SOLUTION INTRAVENOUS at 11:42

## 2020-12-02 RX ADMIN — PROPOFOL 60 MCG/KG/MIN: 10 INJECTION, EMULSION INTRAVENOUS at 01:38

## 2020-12-02 RX ADMIN — SODIUM CHLORIDE: 9 INJECTION, SOLUTION INTRAVENOUS at 11:25

## 2020-12-02 RX ADMIN — SODIUM CHLORIDE 100 MG: 9 INJECTION, SOLUTION INTRAVENOUS at 16:34

## 2020-12-02 RX ADMIN — CEFTRIAXONE SODIUM 1 G: 1 INJECTION, POWDER, FOR SOLUTION INTRAMUSCULAR; INTRAVENOUS at 14:02

## 2020-12-02 RX ADMIN — Medication 15 ML: at 08:29

## 2020-12-02 RX ADMIN — DEXTROSE MONOHYDRATE 500 MG: 5 INJECTION INTRAVENOUS at 14:02

## 2020-12-02 RX ADMIN — DEXAMETHASONE SODIUM PHOSPHATE 6 MG: 10 INJECTION, SOLUTION INTRAMUSCULAR; INTRAVENOUS at 11:26

## 2020-12-02 ASSESSMENT — PULMONARY FUNCTION TESTS
PIF_VALUE: 22
PIF_VALUE: 23
PIF_VALUE: 24
PIF_VALUE: 24
PIF_VALUE: 26
PIF_VALUE: 27

## 2020-12-02 NOTE — PROGRESS NOTES
12/01/20 2254   Vent Information   Vent Type 840   Vent Mode AC/VC   Vt Ordered 350 mL   Rate Set 18 bmp   Peak Flow 50 L/min   FiO2  25 %   SpO2 99 %   SpO2/FiO2 ratio 396   Sensitivity 3   PEEP/CPAP 5   Humidification Source HME   Vent Patient Data   Peak Inspiratory Pressure 28 cmH2O   Mean Airway Pressure 12 cmH20   Rate Measured 26 br/min   Vt Exhaled 322 mL   Minute Volume 8.36 Liters   I:E Ratio 1:2.0   Cough/Sputum   Sputum How Obtained Suctioned;Endotracheal   Spontaneous Breathing Trial (SBT) RT Doc   Pulse 82   Breath Sounds   Right Upper Lobe Diminished   Right Middle Lobe Diminished   Right Lower Lobe Diminished   Left Upper Lobe Diminished   Left Lower Lobe Diminished   Additional Respiratory  Assessments   Resp 26   Position Semi-Solano's   Oral Care Completed? Yes   Oral Care Mouth suctioned   Subglottic Suction Done?  Yes   Alarm Settings   High Pressure Alarm 45 cmH2O   Low Minute Volume Alarm 5 L/min   Apnea (secs) 20 secs   High Respiratory Rate 45 br/min   Low Exhaled Vt  250 mL   Non-Surgical Airway Endo Tracheal Tube   Placement Date/Time: 12/01/20 1442   Timeout: Patient  Placed By: In ED  Inserted by: Shabana Dorsey  Insertion attempts: 1  Airway Device: Endo Tracheal Tube  Size: 7  Placement Verified By[de-identified] Auscultation   Secured at 23 cm   Measured From Lips   Secured Location Right   Secured By Commercial tube gale   Site Condition Dry

## 2020-12-02 NOTE — PROGRESS NOTES
Physical/Occupational Therapy Note  PT/OT orders placed while pt was in ED. Per chart review pt was intubated in ED and transferred to ICU. Will NEED NEW ORDERS to initiate therapy once pt is appropriate. Thank you. No charge.    Jacoby Wing, PT, DPT #[id number]  Roseline Umanzor OTR/L [id number]

## 2020-12-02 NOTE — PROGRESS NOTES
Shift assessment completed, see flow sheets. VSS, NSR, mechanical ventilation continues: 26/350/25%/5. RASS now -1 with propofol infusing at 70mcg/kg/min. Line propofol had been infusing in infiltrated so patient agitated/fighting vent. Non-purposeful movement, unable to redirect whatsoever. NS infusing at 75ml/h, K+ replace ongoing. Bernard in place/patent. BSWR in place for patient safety. Bed in lowest position, wheel locked.

## 2020-12-02 NOTE — PLAN OF CARE
Nutrition Problem #1: Severe malnutrition  Intervention: Food and/or Nutrient Delivery: Continue NPO, Start Tube Feeding  Nutritional Goals: patient will tolerate Vital High-Protein at goal rate of 15 ml/hr x 20 hours without GI distress, without s/s of aspiration, and without additional lab/fluid disturbances + weight will increase to >/= 60# prior to d/c from hospital

## 2020-12-02 NOTE — CARE COORDINATION
Case Management Assessment  Initial Evaluation      Patient Name: Lisa Cobos  YOB: 1950  Diagnosis: Failure to thrive in adult [R62.7]  Acute respiratory failure with hypoxia ([facility] [address] [J96.01]  Date / Time: 12/1/2020  5:50 AM    Admission status/Date: INPT 12/1/2020  Chart Reviewed: Yes      Patient Interviewed: No   Family Interviewed:  Yes - son, Davy Fregoso, [phone]      Hospitalization in the last 30 days:  No      Health Care Decision Maker :     (CM - must 1st enter selection under Navigator - emergency contact- Donovan 8 Relationship and pick relationship)   Who do you trust or have selected to make healthcare decisions for you      Met with: -  Interview conducted  (bedside/phone): telephone with son    Current PCP: [id number] for SNF : [facility]          3 night stay required - Y, N    ADLS  Support Systems/Care Needs:    Transportation: family    Meal Preparation: family    Housing  Living Arrangements: home with son and daughter in law. Steps: 4  Intent for return to present living arrangements: unsure at this time.   Identified Issues: poss snf    Home Care Information  Active with Home Health Care : No Agency:(Services)     Passport/Waiver : No  :                      Phone Number:    Passport/Waiver Services: n/a          Durable Medical Equiptment   DME Provider: n/a  Equipment: n/a  Walker___Cane___RTS___ BSC___Shower Chair___Hospital Bed___W/C____Other________  02 at ____Liter(s)---wears(frequency)_______ HHN ___ CPAP___ BiPap___   N/A____      Home O2 Use :  No    If No for home O2---if presently on O2 during hospitalization:  Yes  if yes CM to follow for potential DC O2 need  Informed of need for care provider to bring portable home O2 tank on day of discharge for nursing to connect prior to leaving:   Not Indicated  Verbalized agreement/Understanding:   Not Indicated    Community Service Affiliation  · Dialysis:No  · Location:  · Dialysis Schedule:  · Phone:   · Fax: Other Community Services: (ex:PT/OT,Mental Health,Wound Clinic, Cardio/Pul [address])    DISCHARGE PLAN: Explained Case Management role/services. Reviewed chart. Pt in ICU on vent. CM spoke with pt son, Melonie Chong, who states that pt lives at home with him and plans for her to return home if possible vs SNF. Will cont to follow as pt progresses.

## 2020-12-02 NOTE — PROGRESS NOTES
Not Performed    Estimated Daily Nutrient Needs:  Energy (kcal):  982-593 kcals/day based on 20-23 kcals/kg/CBW; Weight Used for Energy Requirements:  Current     Protein (g):  46-51 g protein/day based on 1.7-1.9 g/kg/CBW;  Weight Used for Protein Requirements:  Current        Fluid (ml/day):  540-621 mls; Method Used for Fluid Requirements:  1 ml/kcal      Nutrition Related Findings:  patient is an elderly woman; + cachectic and ill-appearing; patient was intubated in ED and remains intubated at this time; + hx of dementia and ETOH abuse in progress notes; per patient's son, patient had not been consuming po nutrition x ~ 5-6 days PTA and her fluid intake had decreased for ~ 5-6 days PTA as well; patient is from home where she lives with her son and plans to return upon d/c, if possible, versus a SNF; patient is sedated on 60 mcg propofol at this time; h/h and Ca are low today; BUN is elevated today; patient is receiving decadron, protonix, remdesivir, MVI in NS at 100 ml/hr, and NS at 75 ml/hr      Wounds:  None       Current Nutrition Therapies:    Current Tube Feeding (TF) Orders:  · Feeding Route: Nasogastric  · Formula: Low Calorie, High Protein  · Schedule: Continuous  · Additives/Modulars: Protein(1 proteinex p2go ONCE DAILY)  · Water Flushes: 60 ml water flushes every 6 hours  · Current TF & Flush Orders Provides: Tube Feed Recommendations Only  · Goal TF & Flush Orders Provides: Vital High Protein at a low goal rate of 15 ml/hr x 20 hours = 300 ml TV, 300 kcals, 26 g protein, 251 ml free water + 1 proteinex p2go once daily (104 kcals, 26 g protein) + 253 kcals from propofol = 657 kcals, 52 g protein      Anthropometric Measures:  · Height: 5' 2\" (157.5 cm)  · Current Body Weight: (obtained on 12/1/20)   · Admission Body Weight: 58 lb 10.3 oz (26.6 kg)(obtained on 12/1/20)    · Usual Body Weight: 58 lb 10.3 oz (26.6 kg)(obtained on 12/1/20; last weigth in weight hx was 102# on 6/15/18)     · Ideal Body Weight: 110 lbs; % Ideal Body Weight 53.3 %   · BMI: 10.7   · BMI Categories: Underweight (BMI less than 22) age over 72       Nutrition Diagnosis:   · Severe malnutrition related to inadequate protein-energy intake, impaired respiratory function, cognitive or neurological impairment as evidenced by NPO or clear liquid status due to medical condition, intubation, poor intake prior to admission, severe loss of subcutaneous fat, severe muscle loss, lab values      Nutrition Interventions:   Food and/or Nutrient Delivery:  Continue NPO, Start Tube Feeding  Nutrition Education/Counseling:  No recommendation at this time   Coordination of Nutrition Care:  Continue to monitor while inpatient, Interdisciplinary Rounds    Goals:  patient will tolerate Vital High-Protein at goal rate of 15 ml/hr x 20 hours without GI distress, without s/s of aspiration, and without additional lab/fluid disturbances + weight will increase to >/= 60# prior to d/c from hospital       Nutrition Monitoring and Evaluation:   Behavioral-Environmental Outcomes:  None Identified   Food/Nutrient Intake Outcomes:  Enteral Nutrition Intake/Tolerance, IVF Intake  Physical Signs/Symptoms Outcomes:  Biochemical Data, Hemodynamic Status, Nutrition Focused Physical Findings, Weight, Skin     Discharge Planning:     Too soon to determine     Electronically signed by Pierre Floyd RD, LD on 12/2/20 at 11:54 AM EST    Contact: [phone]

## 2020-12-02 NOTE — PROGRESS NOTES
Pulmonary & Critical Care Medicine ICU Progress Note      CC:Acute respiratory failure with hypoxemia, acute encephalopathy, Covid 19    Events of Last 24 hours: Patient's hgb dropped, no clear bleeding source. Invasive Lines:   IV Line: pivs    MV:  12/1  Vent Mode: AC/VC Rate Set: 18 bmp/Vt Ordered: 350 mL/ /FiO2 : 25 %  Recent Labs     12/01/20  2100 12/02/20  0515   PHART 7.548* 7.420   [id number] 31.2* 33.0*   PO2ART 75.7 60.2*       IV:   sodium chloride 75 mL/hr at 12/02/20 0800    propofol 60 mcg/kg/min (12/02/20 0844)    dextrose         EXAM:  /69   Pulse 83   Temp 99.7 °F (37.6 °C) (Bladder)   Resp 18   Ht 5' 2\" (1.575 m)   Wt 58 lb 10.3 oz (26.6 kg)   SpO2 93%   BMI 10.73 kg/m²  on vent  Tmax:98.2  CVP:      Intake/Output Summary (Last 24 hours) at 12/2/2020 0859  Last data filed at 12/2/2020 0800  Gross per 24 hour   Intake 3205.5 ml   Output 715 ml   Net 2490.5 ml       Constitutional:  No acute distress. Intubated. Cachetic. HENT: Head is normocephalic and atraumatic. Mucus membranes are moist and the tongue appears normal. Normal appearing nose. External Ears normal.   Neck: No visualized mass. [place] is midline   Resp: No accessory muscle use. Respiratory effort normal. No visualized signs of difficulty breathing or respiratory distress.   Cardiovascular: No LE edema RRR  Skin: No significant exanthematous lesions or discoloration noted on facial skin    Neuro: sedated , not moving extremities          Medications:   mupirocin   Nasal BID    sodium chloride  20 mL Intravenous Once    sodium chloride flush  10 mL Intravenous 2 times per day    enoxaparin  40 mg Subcutaneous Daily    azithromycin  500 mg Intravenous Q24H    cefTRIAXone (ROCEPHIN) IV  1 g Intravenous Q24H    chlorhexidine  15 mL Mouth/Throat BID    pantoprazole  40 mg Intravenous Daily    influenza virus vaccine  0.5 mL Intramuscular Prior to discharge     PRN Meds:  sodium chloride flush, acetaminophen **OR** acetaminophen, polyethylene glycol, promethazine **OR** ondansetron, fentanNYL, glucose, dextrose, glucagon (rDNA), dextrose, potassium chloride **OR** potassium alternative oral replacement **OR** potassium chloride, magnesium sulfate, midazolam, fentanNYL    Results:  CBC:   Recent Labs     12/01/20  0611 12/02/20  0340 12/02/20  0700   WBC 17.2* 9.4  --    HGB 8.7* 6.4* 6.5*   HCT 30.1* 22.0* 22.3*   MCV 64.6* 64.0*  --     215  --      BMP:   Recent Labs     12/01/20  0611 12/01/20  0945 12/01/20  1512 12/02/20  0340     --  143 141   K 6.2* 3.8 3.2* 5.0   CL 96*  --  111* 105   CO2 29  --  24 23   BUN 51*  --  26* 29*   CREATININE 0.8  --  <0.5* <0.5*     LIVER PROFILE:   Recent Labs     12/01/20  0611 12/02/20  0340   AST 59* 37   ALT 32 22   LIPASE 27.0  --    BILITOT 0.6 0.3   ALKPHOS 70 79     PT/INR: No results for input(s): PROTIME, INR in the last 72 hours. APTT: No results for input(s): APTT in the last 72 hours. UA:  Recent Labs     12/01/20  0755   COLORU Yellow   PHUR 6.0   WBCUA 3-5   RBCUA 0-2   MUCUS Rare*   BACTERIA 1+*   CLARITYU Clear   SPECGRAV 1.020   LEUKOCYTESUR Negative   UROBILINOGEN 1.0   BILIRUBINUR SMALL*   BLOODU Negative   GLUCOSEU Negative       Cultures:  12/1 sars Cov2- neg    Films:  CXR12/2: Scattered bilateral hazy airspace opacities worse in the right lower lobe     ASSESSMENT:  · Acute respiratory failure with hypoxemia  · COVID-19 pneumonia right lower lobe  · Dehydration  · Hyperkalemia  · Cachexia/failure to thrive  · Elevated liver function test/AST  · Acute metabolic encephalopathy superimposed on underlying dementia  · Acute anemia     PLAN:  · Mechanical ventilation per my orders. The ventilator was adjusted by me at the bedside for unstable, life threatening respiratory failure. Wean oxygen as tolerated.    · Low tidal volume ventilation as per ARDSnet  · Propofol drip for sedation target RASS -2; prn versed and fentanyl  · Daily SAT/SBT  · IV Decadron 6 mg daily  · start remdesivir    · Start convalescent plasma   · D2 CTX and azithromycin  · Transfuse 1 U PRBC; recheck hgb after transfusion  · F/u cultures  · protonix bid  · Full code per chart  · Rally pack x 3 days  · ICU care- bactroban; hold lovenox    Critical care time spent reviewing labs/films, examining patient, collaborating with other physicians but excluding procedures for life threatening organ failure is 32 minutes.

## 2020-12-02 NOTE — PROGRESS NOTES
sulfate, midazolam, fentanNYL    Lab Data:  Recent Labs     12/01/20  0611 12/02/20  0340 12/02/20  0700   WBC 17.2* 9.4  --    HGB 8.7* 6.4* 6.5*   HCT 30.1* 22.0* 22.3*   MCV 64.6* 64.0*  --     215  --      Recent Labs     12/01/20  0611 12/01/20  0945 12/01/20  1512 12/02/20  0340     --  143 141   K 6.2* 3.8 3.2* 5.0   CL 96*  --  111* 105   CO2 29  --  24 23   BUN 51*  --  26* 29*   CREATININE 0.8  --  <0.5* <0.5*     No results for input(s): CKTOTAL, CKMB, CKMBINDEX, TROPONINI in the last 72 hours. Coagulation:   Lab Results   Component Value Date    INR 0.88 03/04/2018    APTT 36.9 03/04/2018     Cardiac markers:   Lab Results   Component Value Date    CKTOTAL 121 06/01/2017    TROPONINI <0.01 06/01/2017         Lab Results   Component Value Date    ALT 22 12/02/2020    AST 37 12/02/2020    ALKPHOS 79 12/02/2020    BILITOT 0.3 12/02/2020       Lab Results   Component Value Date    INR 0.88 03/04/2018    INR 0.86 06/01/2017    INR 0.90 10/07/2013    PROTIME 10.0 03/04/2018    PROTIME 10.0 06/01/2017    PROTIME 10.1 10/07/2013       Radiology    EKG:  I have reviewed the EKG with the following interpretation:   Sinus tachycardia with ventricular rate 111 bpm     RADIOLOGY  XR CHEST PORTABLE   Final Result   Technically limited study performed portably with arms overlying the lower   chest.  Asymmetric ground-glass opacification appears present in the right   lower lung zone which may represent edema or developing pneumonitis.          cxr    Stable appropriate positions of support apparatus.     2. Stable multifocal pneumonia.             Ct head    No acute intracranial finding.  MRI may be obtained if clinically indicated.                ASSESSMENT/PLAN:     #Acute hypoxic and hypercarbic respiratory failure  - 2/2 COVID 19  - She was intubated by ER provider, mechanical ventilation to be managed by pulmonary  - minimal support on vent, weaning per pulm  -Admit to the intensive care unit     #COVID 19 Pneumonia  -Pulmonary consulted  -IV Decadron day #2  -Rocephin and Zithromax day #2  -started on  remdesivir D2 and s/p convalescent plasma on 12/1       #Sepsis  - tachycardia, tachypnea, leucocytosis  -Secondary to COVID-19 pneumonia  -Lactic acid is ordered- Pending  -Antibiotics as above  -Follow-up cultures  -IV fluids given and improved  blood pressure     #Acute metabolic encephalopathy  -Suspect related to hypercarbia, sepsis, acute infection  -She has a nonfocal neurologic exam, she is moving all extremities. She does have a deformed left upper extremity secondary to a distal humerus fracture which was never repaired. -Management of sepsis, respiratory failure and infection as above  -Check CT head - neg    -Checked TSH (normal),  Low folic acid , replace  -Check ethanol level- neg       #Hyperkalemia-> hypokalemia   - ? Hemolyzed specimen  -Received IV NS, calcium gluconate and IV insulin-partial doses- per ER nurse. Repeat potassium is normalized, then low- replacement ordered           # Acute blood loss anemia, on chronic anemia  History of peptic ulcer disease  -Gastric ulcer noted on EGD in 2013  -Start IV PPI bid , h.h dropped from 8.7 to 6.5, partly with IV fluid boluses   - s.p 1 unit pRBC   - iron studies with folate and iron def, start supplements      #Left distal humerus fracture 2018  -Chronic deformity of the left upper extremity     #Reported history of alcohol abuse  -Unclear if she is still consuming alcohol. Checked an ethanol level- negative     #Severe PCM  - extreme muscle wasting.   Will need dietitian eval when extubated  - checked TSH wnl     DVT Prophylaxis: Lovenox holding for anemia   Diet: Diet NPO Effective Now- can start TF  Code Status: Full Code     DISPO: ICU care      Chris Kennedy MD 12/2/2020 7:19 AM

## 2020-12-03 ENCOUNTER — APPOINTMENT (OUTPATIENT)
Dept: GENERAL RADIOLOGY | Age: 70
DRG: 870 | End: 2020-12-03
Payer: MEDICARE

## 2020-12-03 LAB
A/G RATIO: 0.9 (ref 1.1–2.2)
ALBUMIN SERPL-MCNC: 2.3 G/DL (ref 3.4–5)
ALP BLD-CCNC: 48 U/L (ref 40–129)
ALT SERPL-CCNC: 17 U/L (ref 10–40)
ANION GAP SERPL CALCULATED.3IONS-SCNC: 7 MMOL/L (ref 3–16)
ANISOCYTOSIS: ABNORMAL
AST SERPL-CCNC: 24 U/L (ref 15–37)
BANDED NEUTROPHILS RELATIVE PERCENT: 5 % (ref 0–7)
BASE EXCESS ARTERIAL: 0.8 MMOL/L (ref -3–3)
BASOPHILS ABSOLUTE: 0 K/UL (ref 0–0.2)
BASOPHILS RELATIVE PERCENT: 0 %
BILIRUB SERPL-MCNC: 0.4 MG/DL (ref 0–1)
BLOOD BANK DISPENSE STATUS: NORMAL
BLOOD BANK PRODUCT CODE: NORMAL
BPU ID: NORMAL
BUN BLDV-MCNC: 24 MG/DL (ref 7–20)
CALCIUM SERPL-MCNC: 7.7 MG/DL (ref 8.3–10.6)
CARBOXYHEMOGLOBIN ARTERIAL: 0.5 % (ref 0–1.5)
CHLORIDE BLD-SCNC: 105 MMOL/L (ref 99–110)
CO2: 23 MMOL/L (ref 21–32)
CREAT SERPL-MCNC: <0.5 MG/DL (ref 0.6–1.2)
DESCRIPTION BLOOD BANK: NORMAL
EOSINOPHILS ABSOLUTE: 0 K/UL (ref 0–0.6)
EOSINOPHILS RELATIVE PERCENT: 0 %
GFR AFRICAN AMERICAN: >60
GFR NON-AFRICAN AMERICAN: >60
GLOBULIN: 2.6 G/DL
GLUCOSE BLD-MCNC: 111 MG/DL (ref 70–99)
GLUCOSE BLD-MCNC: 115 MG/DL (ref 70–99)
GLUCOSE BLD-MCNC: 121 MG/DL (ref 70–99)
GLUCOSE BLD-MCNC: 149 MG/DL (ref 70–99)
HCO3 ARTERIAL: 24.6 MMOL/L (ref 21–29)
HCT VFR BLD CALC: 25.4 % (ref 36–48)
HEMATOLOGY PATH CONSULT: NO
HEMOGLOBIN, ART, EXTENDED: 8.2 G/DL (ref 12–16)
HEMOGLOBIN: 7.9 G/DL (ref 12–16)
HYPOCHROMIA: ABNORMAL
LYMPHOCYTES ABSOLUTE: 1.8 K/UL (ref 1–5.1)
LYMPHOCYTES RELATIVE PERCENT: 30 %
MCH RBC QN AUTO: 20.7 PG (ref 26–34)
MCHC RBC AUTO-ENTMCNC: 31 G/DL (ref 31–36)
MCV RBC AUTO: 66.9 FL (ref 80–100)
METHEMOGLOBIN ARTERIAL: 0.3 %
MONOCYTES ABSOLUTE: 0.2 K/UL (ref 0–1.3)
MONOCYTES RELATIVE PERCENT: 4 %
NEUTROPHILS ABSOLUTE: 4 K/UL (ref 1.7–7.7)
NEUTROPHILS RELATIVE PERCENT: 61 %
O2 CONTENT ARTERIAL: 11 ML/DL
O2 SAT, ARTERIAL: 96.2 %
O2 THERAPY: ABNORMAL
PCO2 ARTERIAL: 35.4 MMHG (ref 35–45)
PDW BLD-RTO: 24.9 % (ref 12.4–15.4)
PERFORMED ON: ABNORMAL
PH ARTERIAL: 7.46 (ref 7.35–7.45)
PLATELET # BLD: 157 K/UL (ref 135–450)
PLATELET SLIDE REVIEW: ADEQUATE
PMV BLD AUTO: 8.3 FL (ref 5–10.5)
PO2 ARTERIAL: 77.9 MMHG (ref 75–108)
POTASSIUM REFLEX MAGNESIUM: 3.6 MMOL/L (ref 3.5–5.1)
RBC # BLD: 3.8 M/UL (ref 4–5.2)
SLIDE REVIEW: ABNORMAL
SODIUM BLD-SCNC: 135 MMOL/L (ref 136–145)
TCO2 ARTERIAL: 25.6 MMOL/L
TOTAL PROTEIN: 4.9 G/DL (ref 6.4–8.2)
URINE CULTURE, ROUTINE: NORMAL
WBC # BLD: 6.1 K/UL (ref 4–11)

## 2020-12-03 PROCEDURE — 2500000003 HC RX 250 WO HCPCS: Performed by: INTERNAL MEDICINE

## 2020-12-03 PROCEDURE — 85025 COMPLETE CBC W/AUTO DIFF WBC: CPT

## 2020-12-03 PROCEDURE — 94750 HC PULMONARY COMPLIANCE STUDY: CPT

## 2020-12-03 PROCEDURE — 82803 BLOOD GASES ANY COMBINATION: CPT

## 2020-12-03 PROCEDURE — 2580000003 HC RX 258: Performed by: INTERNAL MEDICINE

## 2020-12-03 PROCEDURE — 94761 N-INVAS EAR/PLS OXIMETRY MLT: CPT

## 2020-12-03 PROCEDURE — 2000000000 HC ICU R&B

## 2020-12-03 PROCEDURE — 6360000002 HC RX W HCPCS: Performed by: INTERNAL MEDICINE

## 2020-12-03 PROCEDURE — 99291 CRITICAL CARE FIRST HOUR: CPT | Performed by: INTERNAL MEDICINE

## 2020-12-03 PROCEDURE — 2700000000 HC OXYGEN THERAPY PER DAY

## 2020-12-03 PROCEDURE — 71045 X-RAY EXAM CHEST 1 VIEW: CPT

## 2020-12-03 PROCEDURE — 6370000000 HC RX 637 (ALT 250 FOR IP): Performed by: INTERNAL MEDICINE

## 2020-12-03 PROCEDURE — 94003 VENT MGMT INPAT SUBQ DAY: CPT

## 2020-12-03 PROCEDURE — 99233 SBSQ HOSP IP/OBS HIGH 50: CPT | Performed by: INTERNAL MEDICINE

## 2020-12-03 PROCEDURE — 6360000002 HC RX W HCPCS: Performed by: PHYSICIAN ASSISTANT

## 2020-12-03 PROCEDURE — 80053 COMPREHEN METABOLIC PANEL: CPT

## 2020-12-03 PROCEDURE — 36415 COLL VENOUS BLD VENIPUNCTURE: CPT

## 2020-12-03 PROCEDURE — 2580000003 HC RX 258: Performed by: PHYSICIAN ASSISTANT

## 2020-12-03 PROCEDURE — C9113 INJ PANTOPRAZOLE SODIUM, VIA: HCPCS | Performed by: INTERNAL MEDICINE

## 2020-12-03 PROCEDURE — 6370000000 HC RX 637 (ALT 250 FOR IP)

## 2020-12-03 RX ORDER — DIMETHICONE, OXYBENZONE, AND PADIMATE O 2; 2.5; 6.6 G/100G; G/100G; G/100G
STICK TOPICAL
Status: COMPLETED
Start: 2020-12-03 | End: 2020-12-03

## 2020-12-03 RX ADMIN — Medication 15 ML: at 19:29

## 2020-12-03 RX ADMIN — PROPOFOL 70 MCG/KG/MIN: 10 INJECTION, EMULSION INTRAVENOUS at 14:10

## 2020-12-03 RX ADMIN — REMDESIVIR 100 MG: 100 INJECTION, POWDER, LYOPHILIZED, FOR SOLUTION INTRAVENOUS at 10:47

## 2020-12-03 RX ADMIN — DEXTROSE MONOHYDRATE 500 MG: 5 INJECTION INTRAVENOUS at 14:10

## 2020-12-03 RX ADMIN — SODIUM CHLORIDE: 9 INJECTION, SOLUTION INTRAVENOUS at 13:11

## 2020-12-03 RX ADMIN — MIDAZOLAM HYDROCHLORIDE 2 MG: 1 INJECTION, SOLUTION INTRAMUSCULAR; INTRAVENOUS at 04:44

## 2020-12-03 RX ADMIN — DIMETHICONE, OXYBENZONE, AND PADIMATE O: 2; 2.5; 6.6 STICK TOPICAL at 20:00

## 2020-12-03 RX ADMIN — DEXAMETHASONE SODIUM PHOSPHATE 6 MG: 10 INJECTION, SOLUTION INTRAMUSCULAR; INTRAVENOUS at 09:21

## 2020-12-03 RX ADMIN — Medication 10 ML: at 19:30

## 2020-12-03 RX ADMIN — MIDAZOLAM HYDROCHLORIDE 2 MG: 1 INJECTION, SOLUTION INTRAMUSCULAR; INTRAVENOUS at 09:21

## 2020-12-03 RX ADMIN — THIAMINE HYDROCHLORIDE: 100 INJECTION, SOLUTION INTRAMUSCULAR; INTRAVENOUS at 11:26

## 2020-12-03 RX ADMIN — Medication 10 ML: at 09:20

## 2020-12-03 RX ADMIN — MUPIROCIN: 20 OINTMENT TOPICAL at 09:20

## 2020-12-03 RX ADMIN — PANTOPRAZOLE SODIUM 40 MG: 40 INJECTION, POWDER, FOR SOLUTION INTRAVENOUS at 19:30

## 2020-12-03 RX ADMIN — Medication 15 ML: at 09:20

## 2020-12-03 RX ADMIN — MIDAZOLAM HYDROCHLORIDE 2 MG: 1 INJECTION, SOLUTION INTRAMUSCULAR; INTRAVENOUS at 01:12

## 2020-12-03 RX ADMIN — PANTOPRAZOLE SODIUM 40 MG: 40 INJECTION, POWDER, FOR SOLUTION INTRAVENOUS at 09:21

## 2020-12-03 RX ADMIN — MUPIROCIN: 20 OINTMENT TOPICAL at 19:29

## 2020-12-03 RX ADMIN — SODIUM CHLORIDE 100 MG: 9 INJECTION, SOLUTION INTRAVENOUS at 15:55

## 2020-12-03 RX ADMIN — FOLIC ACID 1 MG: 1 TABLET ORAL at 09:21

## 2020-12-03 RX ADMIN — CEFTRIAXONE SODIUM 1 G: 1 INJECTION, POWDER, FOR SOLUTION INTRAMUSCULAR; INTRAVENOUS at 14:11

## 2020-12-03 ASSESSMENT — PULMONARY FUNCTION TESTS
PIF_VALUE: 23
PIF_VALUE: 26
PIF_VALUE: 27
PIF_VALUE: 29

## 2020-12-03 NOTE — PROGRESS NOTES
Shift handoff given to Filiberto Quijano [facility]. POC transferred at this time.     Electronically signed by Poli Alacron RN on 12/3/2020 at 7:20 AM

## 2020-12-03 NOTE — PROGRESS NOTES
times per day    azithromycin  500 mg Intravenous Q24H    cefTRIAXone (ROCEPHIN) IV  1 g Intravenous Q24H    chlorhexidine  15 mL Mouth/Throat BID    influenza virus vaccine  0.5 mL Intramuscular Prior to discharge     PRN Meds:  sodium chloride flush, acetaminophen **OR** acetaminophen, polyethylene glycol, promethazine **OR** ondansetron, glucose, dextrose, glucagon (rDNA), dextrose, potassium chloride **OR** potassium alternative oral replacement **OR** potassium chloride, magnesium sulfate, midazolam, fentanNYL    Results:  CBC:   Recent Labs     12/01/20  0611 12/02/20  0340 12/02/20  0700 12/02/20  1321 12/03/20  0431   WBC 17.2* 9.4  --   --  6.1   HGB 8.7* 6.4* 6.5* 8.3* 7.9*   HCT 30.1* 22.0* 22.3* 27.0* 25.4*   MCV 64.6* 64.0*  --   --  66.9*    215  --   --  157     BMP:   Recent Labs     12/01/20  1512 12/02/20  0340 12/03/20  0431    141 135*   K 3.2* 5.0 3.6   * 105 105   CO2 24 23 23   BUN 26* 29* 24*   CREATININE <0.5* <0.5* <0.5*     LIVER PROFILE:   Recent Labs     12/01/20  0611 12/02/20  0340 12/03/20  0431   AST 59* 37 24   ALT 32 22 17   LIPASE 27.0  --   --    BILITOT 0.6 0.3 0.4   ALKPHOS 70 79 48     PT/INR: No results for input(s): PROTIME, INR in the last 72 hours. APTT: No results for input(s): APTT in the last 72 hours.   UA:  Recent Labs     12/01/20  0755   COLORU Yellow   PHUR 6.0   WBCUA 3-5   RBCUA 0-2   MUCUS Rare*   BACTERIA 1+*   CLARITYU Clear   SPECGRAV 1.020   LEUKOCYTESUR Negative   UROBILINOGEN 1.0   BILIRUBINUR SMALL*   BLOODU Negative   GLUCOSEU Negative       Cultures:  12/1 sars Cov2- neg    Films:  CXR12/3: Increasing bilateral hazy airspace opacities worse in the right lower lobe     ASSESSMENT:  · Acute respiratory failure with hypoxemia  · COVID-19 pneumonia right lower lobe  · Dehydration  · Hyperkalemia  · Cachexia/failure to thrive  · Elevated liver function test/AST  · Acute metabolic encephalopathy superimposed on underlying dementia  · Acute anemia     PLAN:  · Mechanical ventilation per my orders. The ventilator was adjusted by me at the bedside for unstable, life threatening respiratory failure. Wean oxygen as tolerated. · Low tidal volume ventilation as per [employer]  · Propofol drip for sedation target RASS -2; prn versed and fentanyl  · Daily SAT/SBT  · D2 V Decadron 6 mg daily  · D2 remdesivir    · Had convalescent plasma 12/3  · D3 CTX and azithromycin  · Transfused 1 U PRBC  · F/u cultures  · protonix bid  · Full code per chart  · Rally pack D2/ 3 days  · ICU care- bactroban; hold lovenox    Critical care time spent reviewing labs/films, examining patient, collaborating with other physicians but excluding procedures for life threatening organ failure is 31 minutes.

## 2020-12-03 NOTE — PROGRESS NOTES
12/02/20 1942   Vent Information   Vent Mode AC/VC   Vt Ordered 350 mL   Rate Set 18 bmp   Peak Flow 50 L/min   SpO2 95 %   Sensitivity 3   PEEP/CPAP 5   Vent Patient Data   Peak Inspiratory Pressure 26 cmH2O   Mean Airway Pressure 9.6 cmH20   Rate Measured 18 br/min   Vt Exhaled 341 mL   Minute Volume 5.31 Liters   I:E Ratio 1:3.4   Plateau Pressure 15 [id number]   Static Compliance 34 mL/cmH2O   Dynamic Compliance 16 mL/cmH2O   Cough/Sputum   Cough Productive   Sputum Amount Small   Sputum Color Creamy   Tenacity Thick

## 2020-12-03 NOTE — PROGRESS NOTES
IM Progress Note    Admit Date:  12/1/2020  2    Interval history:  Admitted with covid 19 infection , resp failure  Emergently intubated in ER   Given 1 unit PRBC for anemia       Subjective:  Ms. Danitza Andres seen sedated in bed, no distress  UOP improved     Objective:   BP (!) 91/59   Pulse 78   Temp 98.5 °F (36.9 °C) (Bladder)   Resp 18   Ht 5' 2\" (1.575 m)   Wt 58 lb 10.3 oz (26.6 kg)   SpO2 94%   BMI 10.73 kg/m²       Intake/Output Summary (Last 24 hours) at 12/3/2020 7518  Last data filed at 12/3/2020 9087  Gross per 24 hour   Intake 4106.47 ml   Output 765 ml   Net 3341.47 ml       Physical Exam:      General: thin cachetic appearing elderly female on vent  Sedated oral ETT and OG noted   Appears to be not in any distress  Mucous Membranes:  Pink , anicteric  Neck: No JVD, no carotid bruit, no thyromegaly  Chest:  Clear to auscultation bilaterally  Cardiovascular:  RRR S1S2 heard, no murmurs or gallops  Abdomen:  Soft, undistended, non tender, no organomegaly, BS present  Extremities:  Severe muscle wasting of all ext  No edema or cyanosis.  Distal pulses well felt  Neurological : sedated        Medications:   Scheduled Medications:    mupirocin   Nasal BID    sodium chloride  20 mL Intravenous Once    remdesivir IVPB  100 mg Intravenous Q24H    dexamethasone  6 mg Intravenous Daily    pantoprazole  40 mg Intravenous BID    folic acid  1 mg Oral Daily    iron sucrose (VENOFER) iv piggyback 100 mL (Admin over 60 minutes)  100 mg Intravenous Q24H    sodium chloride flush  10 mL Intravenous 2 times per day    azithromycin  500 mg Intravenous Q24H    cefTRIAXone (ROCEPHIN) IV  1 g Intravenous Q24H    chlorhexidine  15 mL Mouth/Throat BID    influenza virus vaccine  0.5 mL Intramuscular Prior to discharge     I   sodium chloride 75 mL/hr at 12/02/20 1145    propofol 70 mcg/kg/min (12/02/20 1825)    dextrose       sodium chloride flush, acetaminophen **OR** acetaminophen, polyethylene glycol, promethazine **OR** ondansetron, glucose, dextrose, glucagon (rDNA), dextrose, potassium chloride **OR** potassium alternative oral replacement **OR** potassium chloride, magnesium sulfate, midazolam, fentanNYL    Lab Data:  Recent Labs     12/01/20  0611 12/02/20  0340 12/02/20  0700 12/02/20  1321 12/03/20  0431   WBC 17.2* 9.4  --   --  6.1   HGB 8.7* 6.4* 6.5* 8.3* 7.9*   HCT 30.1* 22.0* 22.3* 27.0* 25.4*   MCV 64.6* 64.0*  --   --  66.9*    215  --   --  157     Recent Labs     12/01/20  1512 12/02/20  0340 12/03/20  0431    141 135*   K 3.2* 5.0 3.6   * 105 105   CO2 24 23 23   BUN 26* 29* 24*   CREATININE <0.5* <0.5* <0.5*     No results for input(s): CKTOTAL, CKMB, CKMBINDEX, TROPONINI in the last 72 hours. Coagulation:   Lab Results   Component Value Date    INR 0.88 03/04/2018    APTT 36.9 03/04/2018     Cardiac markers:   Lab Results   Component Value Date    CKTOTAL 121 06/01/2017    TROPONINI <0.01 06/01/2017         Lab Results   Component Value Date    ALT 17 12/03/2020    AST 24 12/03/2020    ALKPHOS 48 12/03/2020    BILITOT 0.4 12/03/2020       Lab Results   Component Value Date    INR 0.88 03/04/2018    INR 0.86 06/01/2017    INR 0.90 10/07/2013    PROTIME 10.0 03/04/2018    PROTIME 10.0 06/01/2017    PROTIME 10.1 10/07/2013       Radiology    EKG:  I have reviewed the EKG with the following interpretation:   Sinus tachycardia with ventricular rate 111 bpm     RADIOLOGY  XR CHEST PORTABLE   Final Result   Technically limited study performed portably with arms overlying the lower   chest.  Asymmetric ground-glass opacification appears present in the right   lower lung zone which may represent edema or developing pneumonitis.          cxr    Stable appropriate positions of support apparatus.     2. Stable multifocal pneumonia.             Ct head    No acute intracranial finding.  MRI may be obtained if clinically indicated.                ASSESSMENT/PLAN:     #Acute hypoxic Full Code     DISPO: ICU care      Humberto Warner MD 12/3/2020 7:21 AM

## 2020-12-03 NOTE — PROGRESS NOTES
12/03/20 1848   Vent Information   $Ventilation $Subsequent Day   Skin Assessment Clean, dry, & intact   Vent Type 840   Vent Mode AC/VC   Vt Ordered 350 mL   Rate Set 18 bmp   Peak Flow 50 L/min   FiO2  30 %   SpO2 97 %   SpO2/FiO2 ratio 323.33   Sensitivity 3   PEEP/CPAP 5   Humidification Source HME   Vent Patient Data   Peak Inspiratory Pressure 23 cmH2O   Mean Airway Pressure 10 cmH20   Rate Measured 22 br/min   Vt Exhaled 374 mL   Minute Volume 6.77 Liters   I:E Ratio 1:3.3   Plateau Pressure 19 LUF15   Static Compliance 27 mL/cmH2O   Dynamic Compliance 21 mL/cmH2O   Cough/Sputum   Sputum How Obtained Endotracheal;Suctioned   Cough Productive   Sputum Amount Small   Sputum Color Creamy   Tenacity Thick   Spontaneous Breathing Trial (SBT) RT Doc   Pulse 92   Breath Sounds   Right Upper Lobe Diminished   Right Middle Lobe Diminished   Right Lower Lobe Diminished   Left Upper Lobe Diminished   Left Lower Lobe Diminished   Additional Respiratory  Assessments   Resp 22   Position Semi-Solano's   Alarm Settings   High Pressure Alarm 45 cmH2O   Low Minute Volume Alarm 3.5 L/min   Apnea (secs) 20 secs   High Respiratory Rate 45 br/min   Low Exhaled Vt  200 mL   Patient Observation   Observations ETT SIZE 7.0, SECURED AT 23 LIP LINE. AMBU BAG AT HEAD OF BED.      Non-Surgical Airway Endo Tracheal Tube   Placement Date/Time: 12/01/20 1442   Timeout: Patient  Placed By: In ED  Inserted by: Beltran Lam  Insertion attempts: 1  Airway Device: Endo Tracheal Tube  Size: 7  Placement Verified By[de-identified] Auscultation   Secured at 23 cm   Measured From Lips   Secured Location Right   Secured By Commercial tube gale   Site Condition Dry

## 2020-12-03 NOTE — PLAN OF CARE
Problem: Airway Clearance - Ineffective  Goal: Achieve or maintain patent airway  Outcome: Ongoing     Problem: Gas Exchange - Impaired  Goal: Absence of hypoxia  Outcome: Ongoing  Goal: Promote optimal lung function  Outcome: Ongoing     Problem:  Body Temperature -  Risk of, Imbalanced  Goal: Ability to maintain a body temperature within defined limits  Outcome: Ongoing

## 2020-12-04 ENCOUNTER — APPOINTMENT (OUTPATIENT)
Dept: INTERVENTIONAL RADIOLOGY/VASCULAR | Age: 70
DRG: 870 | End: 2020-12-04
Payer: MEDICARE

## 2020-12-04 ENCOUNTER — APPOINTMENT (OUTPATIENT)
Dept: GENERAL RADIOLOGY | Age: 70
DRG: 870 | End: 2020-12-04
Payer: MEDICARE

## 2020-12-04 LAB
A/G RATIO: 0.8 (ref 1.1–2.2)
ALBUMIN SERPL-MCNC: 2.2 G/DL (ref 3.4–5)
ALP BLD-CCNC: 46 U/L (ref 40–129)
ALT SERPL-CCNC: 17 U/L (ref 10–40)
ANION GAP SERPL CALCULATED.3IONS-SCNC: 10 MMOL/L (ref 3–16)
ANION GAP SERPL CALCULATED.3IONS-SCNC: 7 MMOL/L (ref 3–16)
ANISOCYTOSIS: ABNORMAL
ANISOCYTOSIS: ABNORMAL
AST SERPL-CCNC: 24 U/L (ref 15–37)
BANDED NEUTROPHILS RELATIVE PERCENT: 5 % (ref 0–7)
BANDED NEUTROPHILS RELATIVE PERCENT: 6 % (ref 0–7)
BASE EXCESS ARTERIAL: 2.1 MMOL/L (ref -3–3)
BASOPHILS ABSOLUTE: 0 K/UL (ref 0–0.2)
BASOPHILS ABSOLUTE: 0 K/UL (ref 0–0.2)
BASOPHILS RELATIVE PERCENT: 0 %
BASOPHILS RELATIVE PERCENT: 0 %
BILIRUB SERPL-MCNC: 0.3 MG/DL (ref 0–1)
BUN BLDV-MCNC: 18 MG/DL (ref 7–20)
BUN BLDV-MCNC: 19 MG/DL (ref 7–20)
CALCIUM SERPL-MCNC: 7.2 MG/DL (ref 8.3–10.6)
CALCIUM SERPL-MCNC: 7.6 MG/DL (ref 8.3–10.6)
CARBOXYHEMOGLOBIN ARTERIAL: 0.4 % (ref 0–1.5)
CHLORIDE BLD-SCNC: 107 MMOL/L (ref 99–110)
CHLORIDE BLD-SCNC: 109 MMOL/L (ref 99–110)
CO2: 24 MMOL/L (ref 21–32)
CO2: 25 MMOL/L (ref 21–32)
CREAT SERPL-MCNC: <0.5 MG/DL (ref 0.6–1.2)
CREAT SERPL-MCNC: <0.5 MG/DL (ref 0.6–1.2)
CULTURE, RESPIRATORY: NORMAL
EOSINOPHILS ABSOLUTE: 0 K/UL (ref 0–0.6)
EOSINOPHILS ABSOLUTE: 0.1 K/UL (ref 0–0.6)
EOSINOPHILS RELATIVE PERCENT: 0 %
EOSINOPHILS RELATIVE PERCENT: 2 %
GFR AFRICAN AMERICAN: >60
GFR AFRICAN AMERICAN: >60
GFR NON-AFRICAN AMERICAN: >60
GFR NON-AFRICAN AMERICAN: >60
GLOBULIN: 2.8 G/DL
GLUCOSE BLD-MCNC: 107 MG/DL (ref 70–99)
GLUCOSE BLD-MCNC: 90 MG/DL (ref 70–99)
GLUCOSE BLD-MCNC: 96 MG/DL (ref 70–99)
GLUCOSE BLD-MCNC: 97 MG/DL (ref 70–99)
GRAM STAIN RESULT: NORMAL
HCO3 ARTERIAL: 26 MMOL/L (ref 21–29)
HCT VFR BLD CALC: 24.8 % (ref 36–48)
HCT VFR BLD CALC: 27.4 % (ref 36–48)
HEMATOLOGY PATH CONSULT: NO
HEMATOLOGY PATH CONSULT: NO
HEMOGLOBIN, ART, EXTENDED: 8.4 G/DL (ref 12–16)
HEMOGLOBIN: 7.8 G/DL (ref 12–16)
HEMOGLOBIN: 8.4 G/DL (ref 12–16)
HYPOCHROMIA: ABNORMAL
HYPOCHROMIA: ABNORMAL
LYMPHOCYTES ABSOLUTE: 1.8 K/UL (ref 1–5.1)
LYMPHOCYTES ABSOLUTE: 2.2 K/UL (ref 1–5.1)
LYMPHOCYTES RELATIVE PERCENT: 33 %
LYMPHOCYTES RELATIVE PERCENT: 36 %
MAGNESIUM: 1.5 MG/DL (ref 1.8–2.4)
MAGNESIUM: 2.2 MG/DL (ref 1.8–2.4)
MCH RBC QN AUTO: 20.5 PG (ref 26–34)
MCH RBC QN AUTO: 20.8 PG (ref 26–34)
MCHC RBC AUTO-ENTMCNC: 30.5 G/DL (ref 31–36)
MCHC RBC AUTO-ENTMCNC: 31.5 G/DL (ref 31–36)
MCV RBC AUTO: 66.2 FL (ref 80–100)
MCV RBC AUTO: 67.1 FL (ref 80–100)
METAMYELOCYTES RELATIVE PERCENT: 1 %
METAMYELOCYTES RELATIVE PERCENT: 4 %
METHEMOGLOBIN ARTERIAL: 0.3 %
MONOCYTES ABSOLUTE: 0.5 K/UL (ref 0–1.3)
MONOCYTES ABSOLUTE: 0.7 K/UL (ref 0–1.3)
MONOCYTES RELATIVE PERCENT: 11 %
MONOCYTES RELATIVE PERCENT: 9 %
MYELOCYTE PERCENT: 5 %
MYELOCYTE PERCENT: 5 %
NEUTROPHILS ABSOLUTE: 3.1 K/UL (ref 1.7–7.7)
NEUTROPHILS ABSOLUTE: 3.3 K/UL (ref 1.7–7.7)
NEUTROPHILS RELATIVE PERCENT: 37 %
NEUTROPHILS RELATIVE PERCENT: 45 %
NUCLEATED RED BLOOD CELLS: 3 /100 WBC
NUCLEATED RED BLOOD CELLS: 4 /100 WBC
O2 CONTENT ARTERIAL: 11 ML/DL
O2 SAT, ARTERIAL: 96.8 %
O2 THERAPY: ABNORMAL
PCO2 ARTERIAL: 37 MMHG (ref 35–45)
PDW BLD-RTO: 25.1 % (ref 12.4–15.4)
PDW BLD-RTO: 25.5 % (ref 12.4–15.4)
PERFORMED ON: ABNORMAL
PERFORMED ON: NORMAL
PH ARTERIAL: 7.46 (ref 7.35–7.45)
PLATELET # BLD: 142 K/UL (ref 135–450)
PLATELET # BLD: 150 K/UL (ref 135–450)
PLATELET SLIDE REVIEW: ADEQUATE
PLATELET SLIDE REVIEW: ADEQUATE
PMV BLD AUTO: 8.2 FL (ref 5–10.5)
PMV BLD AUTO: 8.5 FL (ref 5–10.5)
PO2 ARTERIAL: 83.2 MMHG (ref 75–108)
POIKILOCYTES: ABNORMAL
POTASSIUM REFLEX MAGNESIUM: 3.2 MMOL/L (ref 3.5–5.1)
POTASSIUM SERPL-SCNC: 2.9 MMOL/L (ref 3.5–5.1)
POTASSIUM SERPL-SCNC: 4.2 MMOL/L (ref 3.5–5.1)
PROMYELOCYTES PERCENT: 1 %
RBC # BLD: 3.74 M/UL (ref 4–5.2)
RBC # BLD: 4.08 M/UL (ref 4–5.2)
SLIDE REVIEW: ABNORMAL
SLIDE REVIEW: ABNORMAL
SMUDGE CELLS: PRESENT
SMUDGE CELLS: PRESENT
SODIUM BLD-SCNC: 141 MMOL/L (ref 136–145)
SODIUM BLD-SCNC: 141 MMOL/L (ref 136–145)
TCO2 ARTERIAL: 27.1 MMOL/L
TOTAL PROTEIN: 5 G/DL (ref 6.4–8.2)
TROPONIN: <0.01 NG/ML
WBC # BLD: 5.6 K/UL (ref 4–11)
WBC # BLD: 6.2 K/UL (ref 4–11)

## 2020-12-04 PROCEDURE — 85025 COMPLETE CBC W/AUTO DIFF WBC: CPT

## 2020-12-04 PROCEDURE — 83735 ASSAY OF MAGNESIUM: CPT

## 2020-12-04 PROCEDURE — C9113 INJ PANTOPRAZOLE SODIUM, VIA: HCPCS | Performed by: INTERNAL MEDICINE

## 2020-12-04 PROCEDURE — 6370000000 HC RX 637 (ALT 250 FOR IP): Performed by: INTERNAL MEDICINE

## 2020-12-04 PROCEDURE — 99291 CRITICAL CARE FIRST HOUR: CPT | Performed by: INTERNAL MEDICINE

## 2020-12-04 PROCEDURE — 2000000000 HC ICU R&B

## 2020-12-04 PROCEDURE — 2580000003 HC RX 258: Performed by: INTERNAL MEDICINE

## 2020-12-04 PROCEDURE — 94003 VENT MGMT INPAT SUBQ DAY: CPT

## 2020-12-04 PROCEDURE — 84484 ASSAY OF TROPONIN QUANT: CPT

## 2020-12-04 PROCEDURE — 94640 AIRWAY INHALATION TREATMENT: CPT

## 2020-12-04 PROCEDURE — 6360000002 HC RX W HCPCS: Performed by: PHYSICIAN ASSISTANT

## 2020-12-04 PROCEDURE — 84132 ASSAY OF SERUM POTASSIUM: CPT

## 2020-12-04 PROCEDURE — 02H633Z INSERTION OF INFUSION DEVICE INTO RIGHT ATRIUM, PERCUTANEOUS APPROACH: ICD-10-PCS | Performed by: INTERNAL MEDICINE

## 2020-12-04 PROCEDURE — 6360000002 HC RX W HCPCS: Performed by: INTERNAL MEDICINE

## 2020-12-04 PROCEDURE — 2580000003 HC RX 258: Performed by: PHYSICIAN ASSISTANT

## 2020-12-04 PROCEDURE — 80053 COMPREHEN METABOLIC PANEL: CPT

## 2020-12-04 PROCEDURE — 94750 HC PULMONARY COMPLIANCE STUDY: CPT

## 2020-12-04 PROCEDURE — 36415 COLL VENOUS BLD VENIPUNCTURE: CPT

## 2020-12-04 PROCEDURE — 99232 SBSQ HOSP IP/OBS MODERATE 35: CPT | Performed by: INTERNAL MEDICINE

## 2020-12-04 PROCEDURE — 71045 X-RAY EXAM CHEST 1 VIEW: CPT

## 2020-12-04 PROCEDURE — 2700000000 HC OXYGEN THERAPY PER DAY

## 2020-12-04 PROCEDURE — 94761 N-INVAS EAR/PLS OXIMETRY MLT: CPT

## 2020-12-04 PROCEDURE — 2500000003 HC RX 250 WO HCPCS: Performed by: INTERNAL MEDICINE

## 2020-12-04 PROCEDURE — 82803 BLOOD GASES ANY COMBINATION: CPT

## 2020-12-04 PROCEDURE — C1751 CATH, INF, PER/CENT/MIDLINE: HCPCS

## 2020-12-04 RX ORDER — SODIUM CHLORIDE 0.9 % (FLUSH) 0.9 %
10 SYRINGE (ML) INJECTION PRN
Status: DISCONTINUED | OUTPATIENT
Start: 2020-12-04 | End: 2020-12-23 | Stop reason: HOSPADM

## 2020-12-04 RX ORDER — IPRATROPIUM BROMIDE AND ALBUTEROL SULFATE 2.5; .5 MG/3ML; MG/3ML
1 SOLUTION RESPIRATORY (INHALATION)
Status: DISCONTINUED | OUTPATIENT
Start: 2020-12-04 | End: 2020-12-04

## 2020-12-04 RX ORDER — SODIUM CHLORIDE 0.9 % (FLUSH) 0.9 %
10 SYRINGE (ML) INJECTION EVERY 12 HOURS SCHEDULED
Status: DISCONTINUED | OUTPATIENT
Start: 2020-12-04 | End: 2020-12-23 | Stop reason: HOSPADM

## 2020-12-04 RX ORDER — LIDOCAINE HYDROCHLORIDE 10 MG/ML
5 INJECTION, SOLUTION EPIDURAL; INFILTRATION; INTRACAUDAL; PERINEURAL ONCE
Status: COMPLETED | OUTPATIENT
Start: 2020-12-04 | End: 2020-12-04

## 2020-12-04 RX ORDER — IPRATROPIUM BROMIDE AND ALBUTEROL SULFATE 2.5; .5 MG/3ML; MG/3ML
1 SOLUTION RESPIRATORY (INHALATION) EVERY 4 HOURS
Status: DISCONTINUED | OUTPATIENT
Start: 2020-12-04 | End: 2020-12-10

## 2020-12-04 RX ADMIN — CEFTRIAXONE SODIUM 1 G: 1 INJECTION, POWDER, FOR SOLUTION INTRAMUSCULAR; INTRAVENOUS at 14:22

## 2020-12-04 RX ADMIN — PANTOPRAZOLE SODIUM 40 MG: 40 INJECTION, POWDER, FOR SOLUTION INTRAVENOUS at 08:12

## 2020-12-04 RX ADMIN — Medication 10 ML: at 08:13

## 2020-12-04 RX ADMIN — LIDOCAINE HYDROCHLORIDE 5 ML: 10 INJECTION, SOLUTION EPIDURAL; INFILTRATION; INTRACAUDAL; PERINEURAL at 17:20

## 2020-12-04 RX ADMIN — Medication 10 MEQ: at 04:20

## 2020-12-04 RX ADMIN — Medication 10 MEQ: at 05:20

## 2020-12-04 RX ADMIN — Medication 10 MEQ: at 08:13

## 2020-12-04 RX ADMIN — REMDESIVIR 100 MG: 100 INJECTION, POWDER, LYOPHILIZED, FOR SOLUTION INTRAVENOUS at 12:26

## 2020-12-04 RX ADMIN — MAGNESIUM SULFATE IN DEXTROSE 1 G: 10 INJECTION, SOLUTION INTRAVENOUS at 09:30

## 2020-12-04 RX ADMIN — Medication 15 ML: at 20:00

## 2020-12-04 RX ADMIN — FOLIC ACID 1 MG: 1 TABLET ORAL at 08:12

## 2020-12-04 RX ADMIN — MUPIROCIN: 20 OINTMENT TOPICAL at 20:00

## 2020-12-04 RX ADMIN — DEXTROSE MONOHYDRATE 500 MG: 5 INJECTION INTRAVENOUS at 14:22

## 2020-12-04 RX ADMIN — Medication 10 MEQ: at 02:00

## 2020-12-04 RX ADMIN — PANTOPRAZOLE SODIUM 40 MG: 40 INJECTION, POWDER, FOR SOLUTION INTRAVENOUS at 20:00

## 2020-12-04 RX ADMIN — MUPIROCIN: 20 OINTMENT TOPICAL at 08:12

## 2020-12-04 RX ADMIN — IPRATROPIUM BROMIDE AND ALBUTEROL SULFATE 1 AMPULE: 2.5; .5 SOLUTION RESPIRATORY (INHALATION) at 22:55

## 2020-12-04 RX ADMIN — Medication 15 ML: at 08:12

## 2020-12-04 RX ADMIN — SODIUM CHLORIDE 100 MG: 9 INJECTION, SOLUTION INTRAVENOUS at 16:34

## 2020-12-04 RX ADMIN — DEXAMETHASONE SODIUM PHOSPHATE 6 MG: 10 INJECTION, SOLUTION INTRAMUSCULAR; INTRAVENOUS at 08:12

## 2020-12-04 RX ADMIN — Medication 10 ML: at 16:49

## 2020-12-04 RX ADMIN — Medication 10 ML: at 20:00

## 2020-12-04 RX ADMIN — MIDAZOLAM HYDROCHLORIDE 2 MG: 1 INJECTION, SOLUTION INTRAMUSCULAR; INTRAVENOUS at 00:15

## 2020-12-04 RX ADMIN — IPRATROPIUM BROMIDE AND ALBUTEROL SULFATE 1 AMPULE: 2.5; .5 SOLUTION RESPIRATORY (INHALATION) at 19:00

## 2020-12-04 RX ADMIN — Medication 10 MEQ: at 06:35

## 2020-12-04 RX ADMIN — THIAMINE HYDROCHLORIDE: 100 INJECTION, SOLUTION INTRAMUSCULAR; INTRAVENOUS at 11:06

## 2020-12-04 RX ADMIN — PROPOFOL 70 MCG/KG/MIN: 10 INJECTION, EMULSION INTRAVENOUS at 11:07

## 2020-12-04 RX ADMIN — PROPOFOL 70 MCG/KG/MIN: 10 INJECTION, EMULSION INTRAVENOUS at 18:35

## 2020-12-04 RX ADMIN — Medication 10 MEQ: at 03:20

## 2020-12-04 RX ADMIN — MAGNESIUM SULFATE IN DEXTROSE 1 G: 10 INJECTION, SOLUTION INTRAVENOUS at 08:12

## 2020-12-04 ASSESSMENT — PULMONARY FUNCTION TESTS
PIF_VALUE: 25
PIF_VALUE: 28
PIF_VALUE: 27
PIF_VALUE: 30

## 2020-12-04 NOTE — PROGRESS NOTES
IM Progress Note    Admit Date:  12/1/2020  3    Interval history:  Admitted with covid 19 infection , resp failure  Emergently intubated in ER   Given 1 unit PRBC for anemia     34/6- brief asystolic for 30 sec during nursing care, had CPR for few sec and no meds, ROSC    Subjective:-    Ms. Luisana Vallejo seen sedated in bed, no distress  UOP improved   Failed SBT    Objective:   BP 92/69   Pulse 69   Temp 96.9 °F (36.1 °C) (Bladder)   Resp 18   Ht 5' 2\" (1.575 m)   Wt 58 lb 10.3 oz (26.6 kg)   SpO2 99%   BMI 10.73 kg/m²       Intake/Output Summary (Last 24 hours) at 12/4/2020 0725  Last data filed at 12/3/2020 3804  Gross per 24 hour   Intake 2759.83 ml   Output 1075 ml   Net 1684.83 ml       Physical Exam:      General: thin cachetic appearing elderly female on vent  Sedated oral ETT and OG noted   Appears to be not in any distress  Mucous Membranes:  Pink , anicteric  Neck: No JVD, no carotid bruit, no thyromegaly  Chest:  Clear to auscultation bilaterally  Cardiovascular:  RRR S1S2 heard, no murmurs or gallops  Abdomen:  Soft, undistended, non tender, no organomegaly, BS present  Extremities:  Severe muscle wasting of all ext  No edema or cyanosis.  Distal pulses well felt  Neurological : sedated        Medications:   Scheduled Medications:    folic acid, thiamine, multi-vitamin with vitamin K infusion   Intravenous Daily    mupirocin   Nasal BID    sodium chloride  20 mL Intravenous Once    remdesivir IVPB  100 mg Intravenous Q24H    dexamethasone  6 mg Intravenous Daily    pantoprazole  40 mg Intravenous BID    folic acid  1 mg Oral Daily    iron sucrose (VENOFER) iv piggyback 100 mL (Admin over 60 minutes)  100 mg Intravenous Q24H    sodium chloride flush  10 mL Intravenous 2 times per day    azithromycin  500 mg Intravenous Q24H    cefTRIAXone (ROCEPHIN) IV  1 g Intravenous Q24H    chlorhexidine  15 mL Mouth/Throat BID    influenza virus vaccine  0.5 mL Intramuscular Prior to discharge     I   sodium chloride 75 mL/hr at 12/03/20 1600    propofol 70 mcg/kg/min (12/03/20 1600)    dextrose       sodium chloride flush, acetaminophen **OR** acetaminophen, polyethylene glycol, promethazine **OR** ondansetron, glucose, dextrose, glucagon (rDNA), dextrose, potassium chloride **OR** potassium alternative oral replacement **OR** potassium chloride, magnesium sulfate, midazolam, fentanNYL    Lab Data:  Recent Labs     12/03/20 0431 12/04/20 0131 12/04/20 0415   WBC 6.1 6.2 5.6   HGB 7.9* 8.4* 7.8*   HCT 25.4* 27.4* 24.8*   MCV 66.9* 67.1* 66.2*    150 142     Recent Labs     12/03/20 0431 12/04/20 0131 12/04/20 0415   * 141 141   K 3.6 2.9* 3.2*    107 109   CO2 23 24 25   BUN 24* 19 18   CREATININE <0.5* <0.5* <0.5*     Recent Labs     12/04/20 0131   TROPONINI <0.01       Coagulation:   Lab Results   Component Value Date    INR 0.88 03/04/2018    APTT 36.9 03/04/2018     Cardiac markers:   Lab Results   Component Value Date    CKTOTAL 121 06/01/2017    TROPONINI <0.01 12/04/2020         Lab Results   Component Value Date    ALT 17 12/04/2020    AST 24 12/04/2020    ALKPHOS 46 12/04/2020    BILITOT 0.3 12/04/2020       Lab Results   Component Value Date    INR 0.88 03/04/2018    INR 0.86 06/01/2017    INR 0.90 10/07/2013    PROTIME 10.0 03/04/2018    PROTIME 10.0 06/01/2017    PROTIME 10.1 10/07/2013       Radiology    EKG:  I have reviewed the EKG with the following interpretation:   Sinus tachycardia with ventricular rate 111 bpm     RADIOLOGY  XR CHEST PORTABLE   Final Result   Technically limited study performed portably with arms overlying the lower   chest.  Asymmetric ground-glass opacification appears present in the right   lower lung zone which may represent edema or developing pneumonitis.          cxr    Stable appropriate positions of support apparatus.     2. Stable multifocal pneumonia.             Ct head    No acute intracranial finding.  MRI may be obtained if clinically indicated.           cxr    Stable lines and catheters.         Persistent multifocal interstitial opacities most pronounced both lung bases. Slight improved aeration of the upper and mid lungs.             ASSESSMENT/PLAN:     #Acute hypoxic and hypercarbic respiratory failure  - 2/2 COVID 19  - She was intubated by ER provider, mechanical ventilation to be managed by pulmonary  - minimal support on vent, weaning per pulm  -Admit to the intensive care unit     #COVID 19 Pneumonia  -Pulmonary consulted  -IV Decadron day #4  -Rocephin and Zithromax day #4  - imaging with worse ASD today  -started on  remdesivir D3 and s/p convalescent plasma on 12/1       #Sepsis  - tachycardia, tachypnea, leucocytosis  -Secondary to COVID-19 pneumonia  -Lactic acid is ordered- Pending  -Antibiotics as above  -Follow-up cultures  -IV fluids given and improved  blood pressure    Brief Asystole with cpr requirement on 12/3 , during nursing care  - no meds given, ROSC with in few sec  - stable since      #Acute metabolic encephalopathy  -Suspect related to hypercarbia, sepsis, acute infection  -She has a nonfocal neurologic exam, she is moving all extremities. She does have a deformed left upper extremity secondary to a distal humerus fracture which was never repaired. -Management of sepsis, respiratory failure and infection as above  -Check CT head - neg    -Checked TSH (normal),  Low folic acid , replace  -Check ethanol level- neg       #Hyperkalemia-> hypokalemia   - ? Hemolyzed specimen  -Received IV NS, calcium gluconate and IV insulin-partial doses- per ER nurse.  Repeat potassium is normalized, then low- replacement ordered         # Acute blood loss anemia, on chronic anemia  History of peptic ulcer disease  -Gastric ulcer noted on EGD in 2013  -Start IV PPI bid , h.h dropped from 8.7 to 6.5, partly with IV fluid boluses   - s.p 1 unit pRBC   - iron studies with folate and iron def, started supplements      #Left distal humerus fracture 2018  -Chronic deformity of the left upper extremity     #Reported history of alcohol abuse  -Unclear if she is still consuming alcohol. Checked an ethanol level- negative     #Severe PCM  - extreme muscle wasting.   Will need dietitian eval when extubated  - checked TSH wnl     DVT Prophylaxis: Lovenox holding for anemia   Diet: started TF  Code Status: Full Code     DISPO: ICU care      Henrry Roy MD 12/4/2020 7:25 AM

## 2020-12-04 NOTE — CARE COORDINATION
INTERDISCIPLINARY PLAN OF CARE CONFERENCE    Date/Time: 12/4/2020 9:55 AM  Completed by: Nimo Rowland Case Management      Patient Name:  Karthik Flores  YOB: 1950  Admitting Diagnosis: Failure to thrive in adult [R62.7]  Acute respiratory failure with hypoxia ([facility], [place] [address] [J96.01]     Admit Date/Time:  12/1/2020  5:50 AM    Chart reviewed. Interdisciplinary team contacted or reviewed plan related to patient progress and discharge plans. Disciplines included Case Management, Nursing, and Dietitian. Current Status:ICU, vent  PT/OT recommendation for discharge plan of care: TBD    Expected D/C Disposition:  TBD  Confirmed plan with patient and/or family No: pt cont on vent. Discharge Plan Comments: Pt cont in ICU on vent. Will cont to follow with pt and develop discharge plan pending medical progress.     Home O2 in place on admit: No

## 2020-12-04 NOTE — PROGRESS NOTES
[employer] called, apparently went into asystole on the monitor when she was getting turned by staff. Chest compressions were initiated by staff and patient regained her pulse rather quickly. No meds were administered. - Check a CXR, labs, trops.

## 2020-12-04 NOTE — PROGRESS NOTES
RESPIRATORY THERAPY ASSESSMENT    Name:  Oswaldo Santos  Medical Record Number:  [id number]  Age: 78 y.o. Gender: female  : 1950  Today's Date:  2020  Room:  [facility]/3002-01    Assessment     Is the patient being admitted for a COPD or Asthma exacerbation? No   (If yes the patient will be seen every 4 hours for the first 24 hours and then reassessed)    Patient Admission Diagnosis      Allergies  No Known Allergies    Minimum Predicted Vital Capacity:     vent          Actual Vital Capacity:      vent              Pulmonary History:No history  Home Oxygen Therapy:  room air  Home Respiratory Therapy:None   Current Respiratory Therapy:  duoneb Q4w/a          Respiratory Severity Index(RSI)   Patients with orders for inhalation medications, oxygen, or any therapeutic treatment modality will be placed on Respiratory Protocol. They will be assessed with the first treatment and at least every 72 hours thereafter. The following severity scale will be used to determine frequency of treatment intervention. Smoking History: Pulmonary Disease or Smoking History, Greater than 15 pack year = 2    Social History  Social History     Tobacco Use    Smoking status: Current Every Day Smoker     Packs/day: 1.00     Years: 45.00     Pack years: 45.00     Types: Cigarettes    Smokeless tobacco: Never Used   Substance Use Topics    Alcohol use: Yes     Alcohol/week: 6.0 standard drinks     Types: 6 Shots of liquor per week    Drug use: No       Recent Surgical History: None = 0  Past Surgical History  Past Surgical History:   Procedure Laterality Date    TUBAL LIGATION         Level of Consciousness: Comatose = 4    Level of Activity: Bedridden, unresponsive or quadriplegic = 4    Respiratory Pattern: Increased; RR 21-30 = 1    Breath Sounds: Absent bilaterally and/or with wheezes = 3    Sputum  Sputum Color: Creamy, Tenacity:  Thick, Sputum How Obtained: Endotracheal, Suctioned  Cough: Weak, productive = 2    Vital Signs   BP 95/67   Pulse 81   Temp 99.1 °F (37.3 °C) (Bladder)   Resp 19   Ht 5' 2\" (1.575 m)   Wt 58 lb 10.3 oz (26.6 kg)   SpO2 96%   BMI 10.73 kg/m²   SPO2 (COPD values may differ): 86-87% on room air or greater than 92% on FiO2 35- 50% = 3    Peak Flow (asthma only): not applicable = 0    RSI: Greater than 17 = Contact physician        Plan       Goals: medication    Patient/caregiver was educated on the proper method of use for Respiratory Care Devices:  No: sedated      Level of patient/caregiver understanding able to:   ? Verbalize understanding   ? Demonstrate understanding       ? Teach back        ? Needs reinforcement       ? No available caregiver               ? Other:     Response to education:  Poor     Is patient being placed on Home Treatment Regimen? NA     Does the patient have everything they need prior to discharge? NA  Comments: chart reviewed, pt assessed    Plan of Care: duoneb Q4    Electronically signed by Lizbeth Bustillos RCP on 12/4/2020 at 5:14 PM    Respiratory Protocol Guidelines     1. Assessment and treatment by Respiratory Therapy will be initiated for medication and therapeutic interventions upon initiation of aerosolized medication. 2. Physician will be contacted for respiratory rate (RR) greater than 35 breaths per minute. Therapy will be held for heart rate (HR) greater than 140 beats per minute, pending direction from physician. 3. Bronchodilators will be administered via Metered Dose Inhaler (MDI) with spacer when the following criteria are met:  a. Alert and cooperative     b. HR < 140 bpm  c. RR < 30 bpm                d. Can demonstrate a 2-3 second inspiratory hold  4. Bronchodilators will be administered via Hand Held Nebulizer [facility]) to patients when ANY of the following criteria are met  a. Incognizant or uncooperative          b. Patients treated with HHN at Home        c.  Unable to demonstrate proper use of MDI with spacer     d. RR > 30 bpm

## 2020-12-04 NOTE — PLAN OF CARE
Nutrition Problem #1: Severe malnutrition  Intervention: Food and/or Nutrient Delivery: Continue NPO, Continue Current Tube Feeding  Nutritional Goals: patient will tolerate Vital High-Protein at goal rate of 15 ml/hr x 20 hours without GI distress, without s/s of aspiration, and without additional lab/fluid disturbances + weight will increase to >/= 60# prior to d/c from hospital

## 2020-12-04 NOTE — PROGRESS NOTES
Patient alarming vent. Trying to sit up in bed. PRN Versed given.     Electronically signed by Efrain Boxer, RN on 12/4/2020 at 12:14 AM

## 2020-12-04 NOTE — PROGRESS NOTES
12/04/20 0249   Vent Information   Vent Type 840   Vent Mode AC/VC   Vt Ordered 350 mL   Rate Set 18 bmp   Peak Flow 50 L/min   FiO2  30 %   SpO2 94 %   SpO2/FiO2 ratio 313.33   Sensitivity 3   PEEP/CPAP 5   Humidification Source HME   Vent Patient Data   Peak Inspiratory Pressure 28 cmH2O   Mean Airway Pressure 9.8 cmH20   Rate Measured 18 br/min   Vt Exhaled 364 mL   Minute Volume 5.6 Liters   I:E Ratio 1:3.4   Cough/Sputum   Sputum How Obtained Endotracheal   Cough Productive   Sputum Amount Moderate   Sputum Color Creamy   Tenacity Thick   Spontaneous Breathing Trial (SBT) RT Doc   Pulse 73   Breath Sounds   Right Upper Lobe Diminished   Right Middle Lobe Diminished   Right Lower Lobe Diminished   Left Upper Lobe Diminished   Left Lower Lobe Diminished   Additional Respiratory  Assessments   Resp 18   Alarm Settings   High Pressure Alarm 45 cmH2O   Low Minute Volume Alarm 3.5 L/min   Apnea (secs) 20 secs   High Respiratory Rate 45 br/min   Low Exhaled Vt  200 mL   Patient Observation   Observations 7ett 23 at lip

## 2020-12-04 NOTE — PROGRESS NOTES
Critical K 2.9   PRN IV potassium started per MD order.      Electronically signed by Efrain Boxer, RN on 12/4/2020 at 4:19 AM

## 2020-12-04 NOTE — PROGRESS NOTES
Pulmonary & Critical Care Medicine ICU Progress Note      CC:Acute respiratory failure with hypoxemia, acute encephalopathy, Covid 19    Events of Last 24 hours: Patient had brief episode of Asystole this am, CPR for 30 seconds. Electrolytes repleted. Invasive Lines:   IV Line: pivs    MV:  12/1  Vent Mode: AC/VC Rate Set: 18 bmp/Vt Ordered: 350 mL/ /FiO2 : 30 %  Recent Labs     12/03/20  0555 12/04/20  0404   PHART 7.459* 7.464*   LVM4QUK 35.4 37.0   PO2ART 77.9 83.2       IV:   sodium chloride 75 mL/hr at 12/03/20 1600    propofol 70 mcg/kg/min (12/03/20 1600)    dextrose         EXAM:  BP 87/68   Pulse 73   Temp 96.9 °F (36.1 °C) (Bladder)   Resp 18   Ht 5' 2\" (1.575 m)   Wt 58 lb 10.3 oz (26.6 kg)   SpO2 98%   BMI 10.73 kg/m²  on vent  Tmax:98.3  CVP:      Intake/Output Summary (Last 24 hours) at 12/4/2020 0513  Last data filed at 12/3/2020 1938  Gross per 24 hour   Intake 3086.5 ml   Output 1075 ml   Net 2011.5 ml       Constitutional:  No acute distress. Intubated. Cachetic. HENT: Head is normocephalic and atraumatic. Mucus membranes are moist and the tongue appears normal. Normal appearing nose. External Ears normal.   Neck: No visualized mass. [place] is midline   Resp: No accessory muscle use. Respiratory effort normal. No visualized signs of difficulty breathing or respiratory distress.   Cardiovascular: No LE edema RRR  Skin: No significant exanthematous lesions or discoloration noted on facial skin    Neuro: sedated , not moving extremities          Medications:   folic acid, thiamine, multi-vitamin with vitamin K infusion   Intravenous Daily    medicated lip balm        mupirocin   Nasal BID    sodium chloride  20 mL Intravenous Once    remdesivir IVPB  100 mg Intravenous Q24H    dexamethasone  6 mg Intravenous Daily    pantoprazole  40 mg Intravenous BID    folic acid  1 mg Oral Daily    iron sucrose (VENOFER) iv piggyback 100 mL (Admin over 60 minutes)  100 mg Intravenous Q24H  sodium chloride flush  10 mL Intravenous 2 times per day    azithromycin  500 mg Intravenous Q24H    cefTRIAXone (ROCEPHIN) IV  1 g Intravenous Q24H    chlorhexidine  15 mL Mouth/Throat BID    influenza virus vaccine  0.5 mL Intramuscular Prior to discharge     PRN Meds:  sodium chloride flush, acetaminophen **OR** acetaminophen, polyethylene glycol, promethazine **OR** ondansetron, glucose, dextrose, glucagon (rDNA), dextrose, potassium chloride **OR** potassium alternative oral replacement **OR** potassium chloride, magnesium sulfate, midazolam, fentanNYL    Results:  CBC:   Recent Labs     12/03/20  0431 12/04/20  0131 12/04/20  0415   WBC 6.1 6.2 5.6   HGB 7.9* 8.4* 7.8*   HCT 25.4* 27.4* 24.8*   MCV 66.9* 67.1* 66.2*    150 142     BMP:   Recent Labs     12/03/20  0431 12/04/20  0131 12/04/20  0415   * 141 141   K 3.6 2.9* 3.2*    107 109   CO2 23 24 25   BUN 24* 19 18   CREATININE <0.5* <0.5* <0.5*     LIVER PROFILE:   Recent Labs     12/01/20  0611 12/02/20  0340 12/03/20  0431 12/04/20  0415   AST 59* 37 24 24   ALT 32 22 17 17   LIPASE 27.0  --   --   --    BILITOT 0.6 0.3 0.4 0.3   ALKPHOS 70 79 48 46     PT/INR: No results for input(s): PROTIME, INR in the last 72 hours. APTT: No results for input(s): APTT in the last 72 hours.   UA:  Recent Labs     12/01/20  0755   COLORU Yellow   PHUR 6.0   WBCUA 3-5   RBCUA 0-2   MUCUS Rare*   BACTERIA 1+*   CLARITYU Clear   SPECGRAV 1.020   LEUKOCYTESUR Negative   UROBILINOGEN 1.0   BILIRUBINUR SMALL*   BLOODU Negative   GLUCOSEU Negative       Cultures:  12/1 sars Cov2- neg    Films:  CXR12/3: Increasing bilateral hazy airspace opacities worse in the right lower lobe     ASSESSMENT:  · Acute respiratory failure with hypoxemia  · COVID-19 pneumonia right lower lobe  · Dehydration  · Hyperkalemia  · Cachexia/failure to thrive  · Elevated liver function test/AST  · Acute metabolic encephalopathy superimposed on underlying dementia  · Acute anemia  · Loose stools  · Brief asystolic arrest early am 94/8- ?etiology 30 seconds before ROSC     PLAN:  · Mechanical ventilation per my orders. The ventilator was adjusted by me at the bedside for unstable, life threatening respiratory failure. Wean oxygen as tolerated. · Low tidal volume ventilation as per ARDS[employer]  · Propofol drip for sedation target RASS -2; prn versed and fentanyl  · Daily SAT/SBT  · D3 Decadron 6 mg daily  · D3 remdesivir    · Had convalescent plasma 12/3  · D4 CTX and azithromycin  · Transfused 1 U PRBC  · F/u cultures  · Get PICC  · protonix bid  · Full code per chart  · Rally pack D3/3 days  · Replace K and Magnesium; then recheck  · Inhaled bronchodilators   · ICU care- Climmie Rosemary; hold lovenox    Critical care time spent reviewing labs/films, examining patient, collaborating with other physicians but excluding procedures for life threatening organ failure is 32 minutes.

## 2020-12-04 NOTE — PROGRESS NOTES
Comprehensive Nutrition Assessment    Type and Reason for Visit:  Reassess    Nutrition Recommendations/Plan:   1. Continue Vital High-Protein at goal rate of 15 ml/hr x 20 hours + 60 ml water flushes every 6 hours or per MD guidance + one proteinex P2Go once daily via feeding tube. 2. Monitor TF rate, intake, and tolerance + water flushes + administration of one proteinex P2Go once daily. 3. Monitor vent status, sedation type/amount, and check triglycerides on 12/5-12/6 since propofol amount is so high currently (propofol at 70 mcg x 24 hours = 288 kcals from lipids). 4. Please obtain an updated weight for this patient - last weight was obtained on 12/1/20.   5. Monitor nutrition-related labs, bowel function, and weight trends.      Nutrition Assessment:  patient has slightly improved from a nutritional standpoint AEB TF was started and patient is tolerating TF at current goal rate, however, she remains at risk for further compromise d/t TF goal is low since propofol amount is so high currently (70 mcg), patient is severely malnourished, and patient coded early this am (around 1 am); will continue VItal High-Protein at goal rate of 15 ml/hr x 20 hours + 60 ml water flushes every 6 hours + one proteinex P2Go once daily via feeding tube    Malnutrition Assessment:  Malnutrition Status:  Severe malnutrition    Context:  Acute Illness     Findings of the 6 clinical characteristics of malnutrition:  Energy Intake:  7 - 50% or less of estimated energy requirements for 5 or more days  Weight Loss:  Unable to assess     Body Fat Loss:  7 - Moderate body fat loss(observed through room window and on camera monitor d/t COVID-19 precautions) Orbital, Buccal region   Muscle Mass Loss:  7 - Moderate muscle mass loss(observed through room window and on camera monitor d/t COVID-19 precautions) Temples (temporalis)  Fluid Accumulation:  No significant fluid accumulation     Strength:  Not Performed    Estimated Daily Nutrient Needs:  Energy (kcal):  598 - 650 kcals based on 23-25 kcals/kg/CBW; Weight Used for Energy Requirements:  Current     Protein (g):  44 - 47 g protein based on 1.7-1.8 g/kg/CBW;  Weight Used for Protein Requirements:  Current        Fluid (ml/day):  598 - 650 ml; Method Used for Fluid Requirements:  1 ml/kcal      Nutrition Related Findings:  patient remains intubated and sedated on 70 mcg propofol at this time; a code blue was called for patient around 1 am this am; patient responds to pain; abdomen is flat, soft, and bowel sounds are active; + BM documented for today; patient is receiving decadron, folic acid, protonix, remdesivir, and NS at 75 ml/hr; K and Ca are low today      Wounds:  None       Current Nutrition Therapies:    Current Tube Feeding (TF) Orders:  · Feeding Route: Nasogastric  · Formula: Low Calorie, High Protein  · Schedule: Continuous  · Additives/Modulars: Protein(1 proteinex p2go ONCE DAILY)  · Water Flushes: 60 ml water flushes every 6 hours  · Current TF & Flush Orders Provides: TF is at goal and patient is tolerating well  · Goal TF & Flush Orders Provides: Vital High Protein at a low goal rate of 15 ml/hr x 20 hours = 300 ml TV, 300 kcals, 26 g protein, 251 ml free water + 1 proteinex p2go once daily (104 kcals, 26 g protein) + 253 kcals from propofol = 657 kcals, 52 g protein      Anthropometric Measures:  · Height: 5' 2\" (157.5 cm)  · Current Body Weight: 58 lb 10.3 oz (26.6 kg)(obtained on 12/1/20)   · Admission Body Weight: 58 lb 10.3 oz (26.6 kg)(obtained on 12/1/20)    · Usual Body Weight: 58 lb 10.3 oz (26.6 kg)(obtained on 12/1/20)     · Ideal Body Weight: 110 lbs; % Ideal Body Weight 53.3 %   · BMI: 10.7  · BMI Categories: Underweight (BMI less than 22) age over 72       Nutrition Diagnosis:   · Severe malnutrition related to inadequate protein-energy intake, impaired respiratory function, cognitive or neurological impairment as evidenced by NPO or clear liquid status due to medical condition, intubation, poor intake prior to admission, severe loss of subcutaneous fat, severe muscle loss, lab values      Nutrition Interventions:   Food and/or Nutrient Delivery:  Continue NPO, Continue Current Tube Feeding  Nutrition Education/Counseling:  No recommendation at this time   Coordination of Nutrition Care:  Continue to monitor while inpatient, Interdisciplinary Rounds    Goals:  patient will tolerate Vital High-Protein at goal rate of 15 ml/hr x 20 hours without GI distress, without s/s of aspiration, and without additional lab/fluid disturbances + weight will increase to >/= 60# prior to d/c from hospital       Nutrition Monitoring and Evaluation:   Behavioral-Environmental Outcomes:  None Identified   Food/Nutrient Intake Outcomes:  Enteral Nutrition Intake/Tolerance, IVF Intake  Physical Signs/Symptoms Outcomes:  Biochemical Data, Hemodynamic Status, Nutrition Focused Physical Findings, Skin, Weight     Discharge Planning:     Too soon to determine     Electronically signed by Sahil Valerio RD, LD on 12/4/20 at 10:33 AM EST    Contact: [phone]

## 2020-12-05 ENCOUNTER — APPOINTMENT (OUTPATIENT)
Dept: GENERAL RADIOLOGY | Age: 70
DRG: 870 | End: 2020-12-05
Payer: MEDICARE

## 2020-12-05 LAB
A/G RATIO: 0.9 (ref 1.1–2.2)
ALBUMIN SERPL-MCNC: 2.3 G/DL (ref 3.4–5)
ALP BLD-CCNC: 39 U/L (ref 40–129)
ALT SERPL-CCNC: 13 U/L (ref 10–40)
ANION GAP SERPL CALCULATED.3IONS-SCNC: 5 MMOL/L (ref 3–16)
ANISOCYTOSIS: ABNORMAL
AST SERPL-CCNC: 18 U/L (ref 15–37)
BANDED NEUTROPHILS RELATIVE PERCENT: 6 % (ref 0–7)
BASE EXCESS ARTERIAL: 4.5 MMOL/L (ref -3–3)
BASOPHILS ABSOLUTE: 0 K/UL (ref 0–0.2)
BASOPHILS RELATIVE PERCENT: 0 %
BILIRUB SERPL-MCNC: <0.2 MG/DL (ref 0–1)
BUN BLDV-MCNC: 17 MG/DL (ref 7–20)
CALCIUM SERPL-MCNC: 7.5 MG/DL (ref 8.3–10.6)
CARBOXYHEMOGLOBIN ARTERIAL: 0.8 % (ref 0–1.5)
CHLORIDE BLD-SCNC: 105 MMOL/L (ref 99–110)
CO2: 28 MMOL/L (ref 21–32)
CREAT SERPL-MCNC: <0.5 MG/DL (ref 0.6–1.2)
EOSINOPHILS ABSOLUTE: 0 K/UL (ref 0–0.6)
EOSINOPHILS RELATIVE PERCENT: 0 %
GFR AFRICAN AMERICAN: >60
GFR NON-AFRICAN AMERICAN: >60
GLOBULIN: 2.6 G/DL
GLUCOSE BLD-MCNC: 90 MG/DL (ref 70–99)
HCO3 ARTERIAL: 28.9 MMOL/L (ref 21–29)
HCT VFR BLD CALC: 24 % (ref 36–48)
HEMATOLOGY PATH CONSULT: NO
HEMOGLOBIN, ART, EXTENDED: 8.3 G/DL (ref 12–16)
HEMOGLOBIN: 7.4 G/DL (ref 12–16)
HYPOCHROMIA: ABNORMAL
LYMPHOCYTES ABSOLUTE: 1 K/UL (ref 1–5.1)
LYMPHOCYTES RELATIVE PERCENT: 19 %
MAGNESIUM: 1.8 MG/DL (ref 1.8–2.4)
MCH RBC QN AUTO: 20.7 PG (ref 26–34)
MCHC RBC AUTO-ENTMCNC: 30.8 G/DL (ref 31–36)
MCV RBC AUTO: 67.2 FL (ref 80–100)
METAMYELOCYTES RELATIVE PERCENT: 2 %
METHEMOGLOBIN ARTERIAL: 0.3 %
MONOCYTES ABSOLUTE: 0.4 K/UL (ref 0–1.3)
MONOCYTES RELATIVE PERCENT: 7 %
NEUTROPHILS ABSOLUTE: 4.1 K/UL (ref 1.7–7.7)
NEUTROPHILS RELATIVE PERCENT: 66 %
O2 CONTENT ARTERIAL: 11 ML/DL
O2 SAT, ARTERIAL: 95.1 %
O2 THERAPY: ABNORMAL
PCO2 ARTERIAL: 42 MMHG (ref 35–45)
PDW BLD-RTO: 26.1 % (ref 12.4–15.4)
PH ARTERIAL: 7.46 (ref 7.35–7.45)
PLATELET # BLD: 155 K/UL (ref 135–450)
PLATELET SLIDE REVIEW: ADEQUATE
PMV BLD AUTO: 8.2 FL (ref 5–10.5)
PO2 ARTERIAL: 71.9 MMHG (ref 75–108)
POIKILOCYTES: ABNORMAL
POTASSIUM REFLEX MAGNESIUM: 3.2 MMOL/L (ref 3.5–5.1)
RBC # BLD: 3.57 M/UL (ref 4–5.2)
SLIDE REVIEW: ABNORMAL
SMUDGE CELLS: PRESENT
SODIUM BLD-SCNC: 138 MMOL/L (ref 136–145)
TCO2 ARTERIAL: 30.2 MMOL/L
TOTAL PROTEIN: 4.9 G/DL (ref 6.4–8.2)
WBC # BLD: 5.5 K/UL (ref 4–11)

## 2020-12-05 PROCEDURE — 85025 COMPLETE CBC W/AUTO DIFF WBC: CPT

## 2020-12-05 PROCEDURE — 80053 COMPREHEN METABOLIC PANEL: CPT

## 2020-12-05 PROCEDURE — 99233 SBSQ HOSP IP/OBS HIGH 50: CPT | Performed by: INTERNAL MEDICINE

## 2020-12-05 PROCEDURE — 6360000002 HC RX W HCPCS: Performed by: INTERNAL MEDICINE

## 2020-12-05 PROCEDURE — 94761 N-INVAS EAR/PLS OXIMETRY MLT: CPT

## 2020-12-05 PROCEDURE — 6370000000 HC RX 637 (ALT 250 FOR IP): Performed by: INTERNAL MEDICINE

## 2020-12-05 PROCEDURE — 2580000003 HC RX 258: Performed by: INTERNAL MEDICINE

## 2020-12-05 PROCEDURE — 6370000000 HC RX 637 (ALT 250 FOR IP): Performed by: PHYSICIAN ASSISTANT

## 2020-12-05 PROCEDURE — 94003 VENT MGMT INPAT SUBQ DAY: CPT

## 2020-12-05 PROCEDURE — 94640 AIRWAY INHALATION TREATMENT: CPT

## 2020-12-05 PROCEDURE — 2500000003 HC RX 250 WO HCPCS: Performed by: INTERNAL MEDICINE

## 2020-12-05 PROCEDURE — 6360000002 HC RX W HCPCS

## 2020-12-05 PROCEDURE — 99291 CRITICAL CARE FIRST HOUR: CPT | Performed by: INTERNAL MEDICINE

## 2020-12-05 PROCEDURE — 82803 BLOOD GASES ANY COMBINATION: CPT

## 2020-12-05 PROCEDURE — 2000000000 HC ICU R&B

## 2020-12-05 PROCEDURE — 94750 HC PULMONARY COMPLIANCE STUDY: CPT

## 2020-12-05 PROCEDURE — 2700000000 HC OXYGEN THERAPY PER DAY

## 2020-12-05 PROCEDURE — C9113 INJ PANTOPRAZOLE SODIUM, VIA: HCPCS | Performed by: INTERNAL MEDICINE

## 2020-12-05 PROCEDURE — 83735 ASSAY OF MAGNESIUM: CPT

## 2020-12-05 PROCEDURE — 2580000003 HC RX 258: Performed by: PHYSICIAN ASSISTANT

## 2020-12-05 PROCEDURE — 71045 X-RAY EXAM CHEST 1 VIEW: CPT

## 2020-12-05 PROCEDURE — 36592 COLLECT BLOOD FROM PICC: CPT

## 2020-12-05 RX ORDER — MIDAZOLAM HYDROCHLORIDE 5 MG/ML
INJECTION INTRAMUSCULAR; INTRAVENOUS
Status: COMPLETED
Start: 2020-12-05 | End: 2020-12-05

## 2020-12-05 RX ADMIN — REMDESIVIR 100 MG: 100 INJECTION, POWDER, LYOPHILIZED, FOR SOLUTION INTRAVENOUS at 11:33

## 2020-12-05 RX ADMIN — MIDAZOLAM HYDROCHLORIDE 2 MG: 5 INJECTION, SOLUTION INTRAMUSCULAR; INTRAVENOUS at 11:37

## 2020-12-05 RX ADMIN — Medication 10 ML: at 21:19

## 2020-12-05 RX ADMIN — DEXAMETHASONE SODIUM PHOSPHATE 6 MG: 10 INJECTION, SOLUTION INTRAMUSCULAR; INTRAVENOUS at 08:43

## 2020-12-05 RX ADMIN — PROPOFOL 70 MCG/KG/MIN: 10 INJECTION, EMULSION INTRAVENOUS at 17:32

## 2020-12-05 RX ADMIN — MUPIROCIN: 20 OINTMENT TOPICAL at 21:18

## 2020-12-05 RX ADMIN — PANTOPRAZOLE SODIUM 40 MG: 40 INJECTION, POWDER, FOR SOLUTION INTRAVENOUS at 21:18

## 2020-12-05 RX ADMIN — IPRATROPIUM BROMIDE AND ALBUTEROL SULFATE 1 AMPULE: 2.5; .5 SOLUTION RESPIRATORY (INHALATION) at 03:20

## 2020-12-05 RX ADMIN — IPRATROPIUM BROMIDE AND ALBUTEROL SULFATE 1 AMPULE: 2.5; .5 SOLUTION RESPIRATORY (INHALATION) at 15:13

## 2020-12-05 RX ADMIN — PANTOPRAZOLE SODIUM 40 MG: 40 INJECTION, POWDER, FOR SOLUTION INTRAVENOUS at 08:43

## 2020-12-05 RX ADMIN — PROPOFOL 70 MCG/KG/MIN: 10 INJECTION, EMULSION INTRAVENOUS at 08:53

## 2020-12-05 RX ADMIN — IPRATROPIUM BROMIDE AND ALBUTEROL SULFATE 1 AMPULE: 2.5; .5 SOLUTION RESPIRATORY (INHALATION) at 07:09

## 2020-12-05 RX ADMIN — MIDAZOLAM HYDROCHLORIDE 2 MG: 1 INJECTION, SOLUTION INTRAMUSCULAR; INTRAVENOUS at 04:10

## 2020-12-05 RX ADMIN — MIDAZOLAM HYDROCHLORIDE 2 MG: 1 INJECTION, SOLUTION INTRAMUSCULAR; INTRAVENOUS at 17:23

## 2020-12-05 RX ADMIN — Medication 10 ML: at 08:43

## 2020-12-05 RX ADMIN — SODIUM CHLORIDE 100 MG: 9 INJECTION, SOLUTION INTRAVENOUS at 16:12

## 2020-12-05 RX ADMIN — MIDAZOLAM HYDROCHLORIDE 2 MG: 1 INJECTION, SOLUTION INTRAMUSCULAR; INTRAVENOUS at 22:00

## 2020-12-05 RX ADMIN — IPRATROPIUM BROMIDE AND ALBUTEROL SULFATE 1 AMPULE: 2.5; .5 SOLUTION RESPIRATORY (INHALATION) at 23:08

## 2020-12-05 RX ADMIN — DEXTROSE MONOHYDRATE 500 MG: 5 INJECTION INTRAVENOUS at 13:20

## 2020-12-05 RX ADMIN — CEFTRIAXONE SODIUM 1 G: 1 INJECTION, POWDER, FOR SOLUTION INTRAMUSCULAR; INTRAVENOUS at 14:49

## 2020-12-05 RX ADMIN — Medication 15 ML: at 08:43

## 2020-12-05 RX ADMIN — FOLIC ACID 1 MG: 1 TABLET ORAL at 08:43

## 2020-12-05 RX ADMIN — IPRATROPIUM BROMIDE AND ALBUTEROL SULFATE 1 AMPULE: 2.5; .5 SOLUTION RESPIRATORY (INHALATION) at 10:51

## 2020-12-05 RX ADMIN — MUPIROCIN: 20 OINTMENT TOPICAL at 08:44

## 2020-12-05 RX ADMIN — Medication 15 ML: at 21:18

## 2020-12-05 RX ADMIN — Medication 10 ML: at 08:44

## 2020-12-05 RX ADMIN — IPRATROPIUM BROMIDE AND ALBUTEROL SULFATE 1 AMPULE: 2.5; .5 SOLUTION RESPIRATORY (INHALATION) at 19:53

## 2020-12-05 RX ADMIN — SODIUM CHLORIDE: 9 INJECTION, SOLUTION INTRAVENOUS at 13:20

## 2020-12-05 RX ADMIN — POTASSIUM BICARBONATE 40 MEQ: 782 TABLET, EFFERVESCENT ORAL at 08:43

## 2020-12-05 ASSESSMENT — PULMONARY FUNCTION TESTS
PIF_VALUE: 27
PIF_VALUE: 24
PIF_VALUE: 25
PIF_VALUE: 25
PIF_VALUE: 27
PIF_VALUE: 35

## 2020-12-05 NOTE — PROGRESS NOTES
Order for rapid sequence intubation obtained. Patient pre-oxygenated to with airvo 60 L 100% to a saturation 95 percent. 100 MCG of Fentanyl ordered and administered. 20 mg of Etomidate ordered and administered. 100 mg of Succinylcholine ordered and administered. Dr. Stevie Valdez inserted a number  8 ET tube. The ET tube is secured at 22 cm measured at the patients lip line. Breath sounds are equal bilaterally. There is a positive color change noted in the colorimetric CO2 detector. RT connected the patient to a ventilator. See orders for ventilator orders. OG tube inserted to a depth of 58 cm. Ausculation of air bolus is positive. Aspirated stomach contents are green. POST Intubation ORDERS    ABG ordered in 2 hours  Portable Chest x-ray ordered to confirm placement of the patients ET tube and gastric tube. Bilateral wrist restraints ordered and initiated. Pulses present distal to restraints. Propofol drip ordered for sedation. See EMAR and orders. Dr. Bel Murphy at bedside to examine L CT. 70 mg rocuronium given d/t pt peak pressuring at 61. SBP 60s. 1L Bolus order. Levophed ordered started 10 mcg/min. Cardizem and propofol stopped at the time. Pt Supine with decreased peak pressures and increased blood pressures. SBP back up 90-130s, levophed 8 mcg/min, propofol restarted 25 mcg/kg/min, cardizem restarted 15 mg/hr. See physician note to follow.  Electronically signed by Claudia Bonilla RN on 12/5/2020 at 10:11 AM

## 2020-12-05 NOTE — PROGRESS NOTES
Pulmonary & Critical Care Medicine ICU Progress Note      CC:Acute respiratory failure with hypoxemia, acute encephalopathy, Covid 19    Events of Last 24 hours: Patient remains on vent. Invasive Lines:   IV Line: PICC 12/4    MV:  12/1  Vent Mode: AC/VC Rate Set: 18 bmp/Vt Ordered: 350 mL/ /FiO2 : 30 %  Recent Labs     12/03/20  0555 12/04/20  0404   PHART 7.459* 7.464*   [id number] 35.4 37.0   PO2ART 77.9 83.2       IV:   sodium chloride 75 mL/hr at 12/04/20 1630    propofol 70 mcg/kg/min (12/04/20 1835)    dextrose         EXAM:  /64   Pulse 87   Temp 98.6 °F (37 °C) (Bladder)   Resp 20   Ht 5' 2\" (1.575 m)   Wt 58 lb 10.3 oz (26.6 kg)   SpO2 99%   BMI 10.73 kg/m²  on vent  Tmax:98.3  CVP:      Intake/Output Summary (Last 24 hours) at 12/5/2020 2654  Last data filed at 12/5/2020 0400  Gross per 24 hour   Intake 4082.8 ml   Output 2290 ml   Net 1792.8 ml       Constitutional:  No acute distress. Intubated. Cachetic. HENT: Head is normocephalic and atraumatic. Mucus membranes are moist and the tongue appears normal. Normal appearing nose. External Ears normal.   Neck: No visualized mass. Vina [place] is midline   Resp: No accessory muscle use. Respiratory effort normal. No visualized signs of difficulty breathing or respiratory distress.   Cardiovascular: No LE edema RRR  Skin: No significant exanthematous lesions or discoloration noted on facial skin    Neuro: sedated , not moving extremities , not responding to commands          Medications:   sodium chloride flush  10 mL Intravenous 2 times per day    ipratropium-albuterol  1 ampule Inhalation Q4H    mupirocin   Nasal BID    sodium chloride  20 mL Intravenous Once    remdesivir IVPB  100 mg Intravenous Q24H    dexamethasone  6 mg Intravenous Daily    pantoprazole  40 mg Intravenous BID    folic acid  1 mg Oral Daily    iron sucrose (VENOFER) iv piggyback 100 mL (Admin over 60 minutes)  100 mg Intravenous Q24H    sodium chloride flush  10 mL Intravenous 2 times per day    azithromycin  500 mg Intravenous Q24H    cefTRIAXone (ROCEPHIN) IV  1 g Intravenous Q24H    chlorhexidine  15 mL Mouth/Throat BID    influenza virus vaccine  0.5 mL Intramuscular Prior to discharge     PRN Meds:  sodium chloride flush, sodium chloride flush, acetaminophen **OR** acetaminophen, polyethylene glycol, promethazine **OR** ondansetron, glucose, dextrose, glucagon (rDNA), dextrose, potassium chloride **OR** potassium alternative oral replacement **OR** potassium chloride, magnesium sulfate, midazolam, fentanNYL    Results:  CBC:   Recent Labs     12/04/20  0131 12/04/20  0415 12/05/20  0400   WBC 6.2 5.6 5.5   HGB 8.4* 7.8* 7.4*   HCT 27.4* 24.8* 24.0*   MCV 67.1* 66.2* 67.2*    142 155     BMP:   Recent Labs     12/04/20  0131 12/04/20  0415 12/04/20  1226 12/05/20  0400    141  --  138   K 2.9* 3.2* 4.2 3.2*    109  --  105   CO2 24 25  --  28   BUN 19 18  --  17   CREATININE <0.5* <0.5*  --  <0.5*     LIVER PROFILE:   Recent Labs     12/03/20  0431 12/04/20  0415 12/05/20  0400   AST 24 24 18   ALT 17 17 13   BILITOT 0.4 0.3 <0.2   ALKPHOS 48 46 39*     PT/INR: No results for input(s): PROTIME, INR in the last 72 hours. APTT: No results for input(s): APTT in the last 72 hours. UA:  No results for input(s): NITRITE, COLORU, PHUR, LABCAST, WBCUA, RBCUA, MUCUS, TRICHOMONAS, YEAST, BACTERIA, CLARITYU, SPECGRAV, LEUKOCYTESUR, UROBILINOGEN, BILIRUBINUR, BLOODU, GLUCOSEU, AMORPHOUS in the last 72 hours.     Invalid input(s): Lizbeth Odor    Cultures:  12/1 sars Cov2- neg    Films:  CXR12/5: similar bilateral hazy airspace opacities worse in the right lower lobe; hyperinflated     ASSESSMENT:  · Acute respiratory failure with hypoxemia  · COVID-19 pneumonia right lower lobe  · Dehydration  · Hyperkalemia  · Cachexia/failure to thrive  · Elevated liver function test/AST  · Acute metabolic encephalopathy superimposed on underlying dementia  · Acute anemia  · Loose stools  · Brief asystolic arrest early am 03/1- ?etiology 30 seconds before ROSC     PLAN:  · Mechanical ventilation per my orders. The ventilator was adjusted by me at the bedside for unstable, life threatening respiratory failure. Wean oxygen as tolerated. · Low tidal volume ventilation as per [employer]  · Propofol drip for sedation target RASS -2; prn versed and fentanyl  · Daily SAT/SBT  · D4 Decadron 6 mg daily  · D4 remdesivir    · Had convalescent plasma 12/3  · D4 CTX and azithromycin  · Transfused 1 U PRBC  · F/u cultures  · protonix bid  · Full code per chart  · Inhaled bronchodilators   · ICU care- Beltran Garcia; hold lovenox    Critical care time spent reviewing labs/films, examining patient, collaborating with other physicians but excluding procedures for life threatening organ failure is 31 minutes.

## 2020-12-05 NOTE — PROGRESS NOTES
12/05/20 0321   Vent Information   Vent Type 840   Vent Mode AC/VC   Vt Ordered 350 mL   Rate Set 18 bmp   Peak Flow 50 L/min   FiO2  30 %   SpO2 100 %   SpO2/FiO2 ratio 333.33   Sensitivity 3   PEEP/CPAP 5   Humidification Source HME   Vent Patient Data   Peak Inspiratory Pressure 27 cmH2O   Mean Airway Pressure 9.3 cmH20   Rate Measured 18 br/min   Vt Exhaled 395 mL   Minute Volume 6.89 Liters   I:E Ratio 1:3.4   Cough/Sputum   Sputum How Obtained Suctioned;Endotracheal   Sputum Amount Moderate   Sputum Color Cloudy   Tenacity Thick   Spontaneous Breathing Trial (SBT) RT Doc   Pulse 89   Breath Sounds   Right Upper Lobe Diminished   Right Middle Lobe Diminished   Right Lower Lobe Diminished   Left Upper Lobe Diminished   Left Lower Lobe Diminished   Additional Respiratory  Assessments   Resp 15   Position Semi-Solano's   Non-Surgical Airway Endo Tracheal Tube   Placement Date/Time: 12/01/20 1442   Timeout: Patient  Placed By: In ED  Inserted by: Yonis Duff  Insertion attempts: 1  Airway Device: Endo Tracheal Tube  Size: 7  Placement Verified By[de-identified] [address] Renetta 25

## 2020-12-05 NOTE — PROGRESS NOTES
12/04/20 2255   Vent Information   $Ventilation $Subsequent Day   Skin Assessment Clean, dry, & intact   Vent Type 840   Vent Mode AC/VC   Vt Ordered 350 mL   Rate Set 18 bmp   Peak Flow 50 L/min   FiO2  30 %   SpO2 96 %   SpO2/FiO2 ratio 320   Sensitivity 3   PEEP/CPAP 5   Humidification Source HME   Vent Patient Data   Peak Inspiratory Pressure 27 cmH2O   Mean Airway Pressure 9.7 cmH20   Rate Measured 19 br/min   Vt Exhaled 335 mL   Minute Volume 5.8 Liters   I:E Ratio 1:2.3   Cough/Sputum   Sputum How Obtained Endotracheal   Cough Productive   Sputum Amount Moderate   Sputum Color Cloudy   Tenacity Thick   Spontaneous Breathing Trial (SBT) RT Doc   Pulse 86   Breath Sounds   Right Upper Lobe Diminished   Right Middle Lobe Diminished   Right Lower Lobe Diminished   Left Upper Lobe Diminished   Left Lower Lobe Diminished   Additional Respiratory  Assessments   Resp 20   Alarm Settings   High Pressure Alarm 45 cmH2O   Low Minute Volume Alarm 3.5 L/min   Apnea (secs) 20 secs   High Respiratory Rate 45 br/min   Low Exhaled Vt  200 mL   Patient Observation   Observations 7ett 23 at lip

## 2020-12-05 NOTE — PROGRESS NOTES
2 mg given , 3 mg wasted of versed  w/ German Leblanc RN    Electronically signed by Pebbles Akhtar RN on 12/5/2020 at 12:12 PM

## 2020-12-05 NOTE — PROGRESS NOTES
Reassessment completed, see flowsheets, unchanged from prior. VSS. Lungs with rhonchi bilaterally. All ICU Lines and monitoring remain in place. Bed locked in lowest position. Call light within reach. Propofol restarted, 2mg Versed given as RASS +2 and pt back on AC. TF restrated.

## 2020-12-05 NOTE — PLAN OF CARE
of physical injury  Description: Absence of physical injury  Outcome: Ongoing     Problem: Skin Integrity:  Goal: Will show no infection signs and symptoms  Description: Will show no infection signs and symptoms  Outcome: Ongoing  Goal: Absence of new skin breakdown  Description: Absence of new skin breakdown  Outcome: Ongoing     Problem: Nutrition  Goal: Optimal nutrition therapy  12/4/2020 2301 by Kevin Cowden, RN  Outcome: Ongoing  12/4/2020 1032 by Janette Beltran RD, LD  Outcome: Met This Shift  Goal: Understanding of nutritional guidelines  12/4/2020 2301 by Kevin Cowden, RN  Outcome: Ongoing  12/4/2020 1032 by Janette Beltran RD, LD  Outcome: Met This Shift     Problem: Infection - Central Venous Catheter-Associated Bloodstream Infection:  Goal: Will show no infection signs and symptoms  Description: Will show no infection signs and symptoms  Outcome: Ongoing

## 2020-12-05 NOTE — PROGRESS NOTES
Shift Summary    Events- None      Assessment  VSS. Afebrile this shift. Sats mid 90's. BPs on the softer side. Neuro: Rass -2. Non-purposeful movements. Required versed PRN twice for restlessness and biting ETT. Pupils 4 &4. Round, reactive, brisk. +Gag. Resp: Diminished lung sounds all fields. Breaths equal. Unlabored. Trache midline. Vent settings: ETT 7, 23 at lip. Mode AC/VC, FiO2 30%, PEEP 5, Vt 350, Rate 18, PS 0  Cardiac: NSR on monitor. 70-90's. No frequent ectopy. GI : NG/OG at 51 cm at shift change. 3cm depeer from previous chart entry. Active bowel sounds all quadrants. Belly soft, non-tender. Small, brown, watery BMs. NPO. On TF going at 15 (which is goal) with 60mL q6h water flushes. : Bernard patient. Clear, yellow/green urine. Total shift output= 490 mL. Musculoskeletal: Moves upper extremities to pain. LUE deformity. Skin: No skin breakdown. Bony prominences of sacrum, elbows, and heels covered with meplex. IVs: RUE triple PICC patent. Both PIV Removed d/t not flushing or drawing. Gtts  Sedation: Propofol @ 70 mcg/kg/min  Pressors: None  Maintenance: NS @ 75 ml/hr  Other Gtts: None    Jose Luis wrist restraints on. PMS intact. No skin breakdown on wrists. Turned q2h. Bed locked and in lowest position. Call light within reach.

## 2020-12-05 NOTE — PROGRESS NOTES
IM Progress Note    Admit Date:  12/1/2020  4    Interval history:  Admitted with covid 19 infection , resp failure  Emergently intubated in ER   Given 1 unit PRBC for anemia     92/4- brief asystolic for 30 sec during nursing care, had CPR for few sec and no meds, ROSC      12/4- no acute events     Subjective:-    Ms. Britta Alba seen sedated in bed, no distress  UOP improved   Failed SBT again today     Objective:   /61   Pulse 76   Temp 97.1 °F (36.2 °C) (Bladder)   Resp 19   Ht 5' 2\" (1.575 m)   Wt 80 lb (36.3 kg)   SpO2 98%   BMI 14.63 kg/m²       Intake/Output Summary (Last 24 hours) at 12/5/2020 8319  Last data filed at 12/5/2020 0400  Gross per 24 hour   Intake 4202.8 ml   Output 2290 ml   Net 1912.8 ml       Physical Exam:      General: thin cachetic appearing elderly female on vent  Sedated oral ETT and OG noted   Appears to be not in any distress  Mucous Membranes:  Pink , anicteric  Neck: No JVD, no carotid bruit, no thyromegaly  Chest:  Clear to auscultation bilaterally  Cardiovascular:  RRR S1S2 heard, no murmurs or gallops  Abdomen:  Soft, undistended, non tender, no organomegaly, BS present  Extremities:  Severe muscle wasting of all ext  No edema or cyanosis.  Distal pulses well felt  Neurological : sedated        Medications:   Scheduled Medications:    sodium chloride flush  10 mL Intravenous 2 times per day    ipratropium-albuterol  1 ampule Inhalation Q4H    mupirocin   Nasal BID    sodium chloride  20 mL Intravenous Once    remdesivir IVPB  100 mg Intravenous Q24H    dexamethasone  6 mg Intravenous Daily    pantoprazole  40 mg Intravenous BID    folic acid  1 mg Oral Daily    iron sucrose (VENOFER) iv piggyback 100 mL (Admin over 60 minutes)  100 mg Intravenous Q24H    sodium chloride flush  10 mL Intravenous 2 times per day    azithromycin  500 mg Intravenous Q24H    cefTRIAXone (ROCEPHIN) IV  1 g Intravenous Q24H    chlorhexidine  15 mL Mouth/Throat BID    influenza virus vaccine  0.5 mL Intramuscular Prior to discharge     I   sodium chloride 75 mL/hr at 12/04/20 1630    propofol 70 mcg/kg/min (12/04/20 1835)    dextrose       sodium chloride flush, sodium chloride flush, acetaminophen **OR** acetaminophen, polyethylene glycol, promethazine **OR** ondansetron, glucose, dextrose, glucagon (rDNA), dextrose, potassium chloride **OR** potassium alternative oral replacement **OR** potassium chloride, magnesium sulfate, midazolam, fentanNYL    Lab Data:  Recent Labs     12/04/20  0131 12/04/20  0415 12/05/20  0400   WBC 6.2 5.6 5.5   HGB 8.4* 7.8* 7.4*   HCT 27.4* 24.8* 24.0*   MCV 67.1* 66.2* 67.2*    142 155     Recent Labs     12/04/20  0131 12/04/20  0415 12/04/20  1226 12/05/20  0400    141  --  138   K 2.9* 3.2* 4.2 3.2*    109  --  105   CO2 24 25  --  28   BUN 19 18  --  17   CREATININE <0.5* <0.5*  --  <0.5*     Recent Labs     12/04/20 0131   TROPONINI <0.01       Coagulation:   Lab Results   Component Value Date    INR 0.88 03/04/2018    APTT 36.9 03/04/2018     Cardiac markers:   Lab Results   Component Value Date    CKTOTAL 121 06/01/2017    TROPONINI <0.01 12/04/2020         Lab Results   Component Value Date    ALT 13 12/05/2020    AST 18 12/05/2020    ALKPHOS 39 (L) 12/05/2020    BILITOT <0.2 12/05/2020       Lab Results   Component Value Date    INR 0.88 03/04/2018    INR 0.86 06/01/2017    INR 0.90 10/07/2013    PROTIME 10.0 03/04/2018    PROTIME 10.0 06/01/2017    PROTIME 10.1 10/07/2013       Radiology    EKG:  I have reviewed the EKG with the following interpretation:   Sinus tachycardia with ventricular rate 111 bpm     RADIOLOGY  XR CHEST PORTABLE   Final Result   Technically limited study performed portably with arms overlying the lower   chest.  Asymmetric ground-glass opacification appears present in the right   lower lung zone which may represent edema or developing pneumonitis.             cxr    Stable appropriate positions of support apparatus. 2. Stable multifocal pneumonia.             Ct head    No acute intracranial finding.  MRI may be obtained if clinically indicated.           cxr    Stable lines and catheters.         Persistent multifocal interstitial opacities most pronounced both lung bases. Slight improved aeration of the upper and mid lungs.             ASSESSMENT/PLAN:     #Acute hypoxic and hypercarbic respiratory failure  - 2/2 COVID 19  - She was intubated by ER provider, mechanical ventilation to be managed by pulmonary  - minimal support on vent, weaning per pulm  -Admit to the intensive care unit     #COVID 19 Pneumonia  -Pulmonary consulted  -IV Decadron day #6  -Rocephin and Zithromax day #5  - imaging with worse ASD today  -started on  remdesivir D4 and s/p convalescent plasma on 12/1       #Sepsis  - tachycardia, tachypnea, leucocytosis  -Secondary to COVID-19 pneumonia  -Antibiotics as above  -Follow-up cultures  -IV fluids given and improved  blood pressure    Brief Asystole with cpr requirement on 12/3 , during nursing care  - no meds given, ROSC with in few sec  - stable since      #Acute metabolic encephalopathy  -Suspect related to hypercarbia, sepsis, acute infection  -She has a nonfocal neurologic exam, she is moving all extremities. She does have a deformed left upper extremity secondary to a distal humerus fracture which was never repaired. -Management of sepsis, respiratory failure and infection as above  -Checked CT head - neg    -Checked TSH (normal),  Low folic acid , replace  -Checked ethanol level- neg       #Hyperkalemia-> hypokalemia   - ? Hemolyzed specimen  -Received IV NS, calcium gluconate and IV insulin-partial doses- per ER nurse.  Repeat potassium is normalized, then low- replacement ordered         # Acute blood loss anemia, on chronic anemia  History of peptic ulcer disease  -Gastric ulcer noted on EGD in 2013  -Start IV PPI bid , h.h dropped from 8.7 to 6.5, partly with IV fluid boluses   - s.p 1 unit pRBC   - iron studies with folate and iron def, started supplements      #Left distal humerus fracture 2018  -Chronic deformity of the left upper extremity     #Reported history of alcohol abuse  -Unclear if she is still consuming alcohol. Checked an ethanol level- negative     #Severe PCM  - extreme muscle wasting.   Will need dietitian eval when extubated  - checked TSH wnl     DVT Prophylaxis: Lovenox holding for anemia   Diet: started TF  Code Status: Full Code     DISPO: ICU care  Poor prognosis  Cut down sedatiion       Leticia Yarbrough MD 12/5/2020 7:29 AM

## 2020-12-05 NOTE — PROGRESS NOTES
12/04/20 1901   Vent Information   $Ventilation $Subsequent Day   Skin Assessment Clean, dry, & intact   Vent Type 840   Vent Mode AC/VC   Vt Ordered 350 mL   Rate Set 18 bmp   Peak Flow 50 L/min   FiO2  30 %   SpO2 96 %   SpO2/FiO2 ratio 320   Sensitivity 3   PEEP/CPAP 5   Humidification Source HME   Vent Patient Data   Peak Inspiratory Pressure 27 cmH2O   Mean Airway Pressure 9.5 cmH20   Rate Measured 18 br/min   Vt Exhaled 346 mL   Minute Volume 5.8 Liters   I:E Ratio 1:3.4   Plateau Pressure 15 LRL14   Static Compliance 47 mL/cmH2O   Dynamic Compliance 17 mL/cmH2O   Cough/Sputum   Sputum How Obtained Endotracheal   Cough Productive   Sputum Amount Moderate   Sputum Color Cloudy;Creamy   Tenacity Thick   Spontaneous Breathing Trial (SBT) RT Doc   Pulse 87   Breath Sounds   Right Upper Lobe Diminished   Right Middle Lobe Diminished   Right Lower Lobe Diminished   Left Upper Lobe Diminished   Left Lower Lobe Diminished   Additional Respiratory  Assessments   Resp 18   Alarm Settings   High Pressure Alarm 45 cmH2O   Low Minute Volume Alarm 3.5 L/min   Apnea (secs) 20 secs   High Respiratory Rate 45 br/min   Low Exhaled Vt  200 mL   Patient Observation   Observations 7ett 23 at lip

## 2020-12-05 NOTE — PROGRESS NOTES
Reassessment completed, see flowsheets, unchanged from prior. VSS. All ICU Lines and monitoring remain in place. Bed locked in lowest position. Call light within reach. Son Merlyn Bear called and was updated on failing SBt and will attempt tomorrow. All questions answered at this time.

## 2020-12-06 ENCOUNTER — APPOINTMENT (OUTPATIENT)
Dept: GENERAL RADIOLOGY | Age: 70
DRG: 870 | End: 2020-12-06
Payer: MEDICARE

## 2020-12-06 LAB
A/G RATIO: 0.8 (ref 1.1–2.2)
ALBUMIN SERPL-MCNC: 2 G/DL (ref 3.4–5)
ALP BLD-CCNC: 34 U/L (ref 40–129)
ALT SERPL-CCNC: 11 U/L (ref 10–40)
ANION GAP SERPL CALCULATED.3IONS-SCNC: 5 MMOL/L (ref 3–16)
AST SERPL-CCNC: 19 U/L (ref 15–37)
BASE EXCESS ARTERIAL: 3.9 MMOL/L (ref -3–3)
BASOPHILS ABSOLUTE: 0 K/UL (ref 0–0.2)
BASOPHILS RELATIVE PERCENT: 0.4 %
BILIRUB SERPL-MCNC: <0.2 MG/DL (ref 0–1)
BUN BLDV-MCNC: 18 MG/DL (ref 7–20)
CALCIUM SERPL-MCNC: 7.2 MG/DL (ref 8.3–10.6)
CARBOXYHEMOGLOBIN ARTERIAL: 0.7 % (ref 0–1.5)
CHLORIDE BLD-SCNC: 110 MMOL/L (ref 99–110)
CO2: 28 MMOL/L (ref 21–32)
CREAT SERPL-MCNC: <0.5 MG/DL (ref 0.6–1.2)
EOSINOPHILS ABSOLUTE: 0.1 K/UL (ref 0–0.6)
EOSINOPHILS RELATIVE PERCENT: 1.3 %
GFR AFRICAN AMERICAN: >60
GFR NON-AFRICAN AMERICAN: >60
GLOBULIN: 2.5 G/DL
GLUCOSE BLD-MCNC: 118 MG/DL (ref 70–99)
GLUCOSE BLD-MCNC: 85 MG/DL (ref 70–99)
HCO3 ARTERIAL: 28.3 MMOL/L (ref 21–29)
HCT VFR BLD CALC: 22.6 % (ref 36–48)
HEMOGLOBIN, ART, EXTENDED: 7.8 G/DL (ref 12–16)
HEMOGLOBIN: 7.1 G/DL (ref 12–16)
LYMPHOCYTES ABSOLUTE: 0.7 K/UL (ref 1–5.1)
LYMPHOCYTES RELATIVE PERCENT: 17 %
MCH RBC QN AUTO: 21.2 PG (ref 26–34)
MCHC RBC AUTO-ENTMCNC: 31.3 G/DL (ref 31–36)
MCV RBC AUTO: 67.6 FL (ref 80–100)
METHEMOGLOBIN ARTERIAL: 0.3 %
MONOCYTES ABSOLUTE: 0.3 K/UL (ref 0–1.3)
MONOCYTES RELATIVE PERCENT: 8.5 %
NEUTROPHILS ABSOLUTE: 3 K/UL (ref 1.7–7.7)
NEUTROPHILS RELATIVE PERCENT: 72.8 %
O2 CONTENT ARTERIAL: 11 ML/DL
O2 SAT, ARTERIAL: 95.8 %
O2 THERAPY: ABNORMAL
PCO2 ARTERIAL: 41.7 MMHG (ref 35–45)
PDW BLD-RTO: 26.6 % (ref 12.4–15.4)
PERFORMED ON: ABNORMAL
PH ARTERIAL: 7.45 (ref 7.35–7.45)
PLATELET # BLD: 155 K/UL (ref 135–450)
PMV BLD AUTO: 9.1 FL (ref 5–10.5)
PO2 ARTERIAL: 76.5 MMHG (ref 75–108)
POTASSIUM REFLEX MAGNESIUM: 4 MMOL/L (ref 3.5–5.1)
RBC # BLD: 3.34 M/UL (ref 4–5.2)
SODIUM BLD-SCNC: 143 MMOL/L (ref 136–145)
TCO2 ARTERIAL: 29.5 MMOL/L
TOTAL PROTEIN: 4.5 G/DL (ref 6.4–8.2)
WBC # BLD: 4.1 K/UL (ref 4–11)

## 2020-12-06 PROCEDURE — 82803 BLOOD GASES ANY COMBINATION: CPT

## 2020-12-06 PROCEDURE — 94003 VENT MGMT INPAT SUBQ DAY: CPT

## 2020-12-06 PROCEDURE — 94750 HC PULMONARY COMPLIANCE STUDY: CPT

## 2020-12-06 PROCEDURE — 2580000003 HC RX 258: Performed by: PHYSICIAN ASSISTANT

## 2020-12-06 PROCEDURE — 2500000003 HC RX 250 WO HCPCS: Performed by: INTERNAL MEDICINE

## 2020-12-06 PROCEDURE — 94640 AIRWAY INHALATION TREATMENT: CPT

## 2020-12-06 PROCEDURE — 2700000000 HC OXYGEN THERAPY PER DAY

## 2020-12-06 PROCEDURE — 6370000000 HC RX 637 (ALT 250 FOR IP): Performed by: INTERNAL MEDICINE

## 2020-12-06 PROCEDURE — 6360000002 HC RX W HCPCS: Performed by: INTERNAL MEDICINE

## 2020-12-06 PROCEDURE — 2580000003 HC RX 258: Performed by: INTERNAL MEDICINE

## 2020-12-06 PROCEDURE — 94761 N-INVAS EAR/PLS OXIMETRY MLT: CPT

## 2020-12-06 PROCEDURE — 71045 X-RAY EXAM CHEST 1 VIEW: CPT

## 2020-12-06 PROCEDURE — C9113 INJ PANTOPRAZOLE SODIUM, VIA: HCPCS | Performed by: INTERNAL MEDICINE

## 2020-12-06 PROCEDURE — 80053 COMPREHEN METABOLIC PANEL: CPT

## 2020-12-06 PROCEDURE — 85025 COMPLETE CBC W/AUTO DIFF WBC: CPT

## 2020-12-06 PROCEDURE — 2000000000 HC ICU R&B

## 2020-12-06 PROCEDURE — 99232 SBSQ HOSP IP/OBS MODERATE 35: CPT | Performed by: INTERNAL MEDICINE

## 2020-12-06 PROCEDURE — 99291 CRITICAL CARE FIRST HOUR: CPT | Performed by: INTERNAL MEDICINE

## 2020-12-06 RX ADMIN — IPRATROPIUM BROMIDE AND ALBUTEROL SULFATE 1 AMPULE: 2.5; .5 SOLUTION RESPIRATORY (INHALATION) at 22:53

## 2020-12-06 RX ADMIN — PANTOPRAZOLE SODIUM 40 MG: 40 INJECTION, POWDER, FOR SOLUTION INTRAVENOUS at 09:16

## 2020-12-06 RX ADMIN — SODIUM CHLORIDE: 9 INJECTION, SOLUTION INTRAVENOUS at 14:10

## 2020-12-06 RX ADMIN — IPRATROPIUM BROMIDE AND ALBUTEROL SULFATE 1 AMPULE: 2.5; .5 SOLUTION RESPIRATORY (INHALATION) at 19:08

## 2020-12-06 RX ADMIN — Medication 10 ML: at 20:36

## 2020-12-06 RX ADMIN — Medication 15 ML: at 20:35

## 2020-12-06 RX ADMIN — MIDAZOLAM HYDROCHLORIDE 2 MG: 1 INJECTION, SOLUTION INTRAMUSCULAR; INTRAVENOUS at 09:05

## 2020-12-06 RX ADMIN — PANTOPRAZOLE SODIUM 40 MG: 40 INJECTION, POWDER, FOR SOLUTION INTRAVENOUS at 20:35

## 2020-12-06 RX ADMIN — SODIUM CHLORIDE 100 MG: 9 INJECTION, SOLUTION INTRAVENOUS at 16:00

## 2020-12-06 RX ADMIN — Medication 10 ML: at 09:07

## 2020-12-06 RX ADMIN — REMDESIVIR 100 MG: 100 INJECTION, POWDER, LYOPHILIZED, FOR SOLUTION INTRAVENOUS at 11:29

## 2020-12-06 RX ADMIN — IPRATROPIUM BROMIDE AND ALBUTEROL SULFATE 1 AMPULE: 2.5; .5 SOLUTION RESPIRATORY (INHALATION) at 11:03

## 2020-12-06 RX ADMIN — IPRATROPIUM BROMIDE AND ALBUTEROL SULFATE 1 AMPULE: 2.5; .5 SOLUTION RESPIRATORY (INHALATION) at 02:58

## 2020-12-06 RX ADMIN — IPRATROPIUM BROMIDE AND ALBUTEROL SULFATE 1 AMPULE: 2.5; .5 SOLUTION RESPIRATORY (INHALATION) at 15:41

## 2020-12-06 RX ADMIN — PROPOFOL 50 MCG/KG/MIN: 10 INJECTION, EMULSION INTRAVENOUS at 09:21

## 2020-12-06 RX ADMIN — SODIUM CHLORIDE: 9 INJECTION, SOLUTION INTRAVENOUS at 01:00

## 2020-12-06 RX ADMIN — MUPIROCIN: 20 OINTMENT TOPICAL at 20:35

## 2020-12-06 RX ADMIN — Medication 15 ML: at 09:16

## 2020-12-06 RX ADMIN — DEXAMETHASONE SODIUM PHOSPHATE 6 MG: 10 INJECTION, SOLUTION INTRAMUSCULAR; INTRAVENOUS at 09:16

## 2020-12-06 RX ADMIN — CEFTRIAXONE SODIUM 1 G: 1 INJECTION, POWDER, FOR SOLUTION INTRAMUSCULAR; INTRAVENOUS at 14:10

## 2020-12-06 RX ADMIN — MIDAZOLAM HYDROCHLORIDE 2 MG: 1 INJECTION, SOLUTION INTRAMUSCULAR; INTRAVENOUS at 03:10

## 2020-12-06 RX ADMIN — FOLIC ACID 1 MG: 1 TABLET ORAL at 09:16

## 2020-12-06 RX ADMIN — PROPOFOL 65 MCG/KG/MIN: 10 INJECTION, EMULSION INTRAVENOUS at 22:57

## 2020-12-06 RX ADMIN — IPRATROPIUM BROMIDE AND ALBUTEROL SULFATE 1 AMPULE: 2.5; .5 SOLUTION RESPIRATORY (INHALATION) at 08:33

## 2020-12-06 RX ADMIN — PROPOFOL 70 MCG/KG/MIN: 10 INJECTION, EMULSION INTRAVENOUS at 01:00

## 2020-12-06 RX ADMIN — PROPOFOL 70 MCG/KG/MIN: 10 INJECTION, EMULSION INTRAVENOUS at 16:00

## 2020-12-06 RX ADMIN — MUPIROCIN: 20 OINTMENT TOPICAL at 09:07

## 2020-12-06 ASSESSMENT — PULMONARY FUNCTION TESTS
PIF_VALUE: 11
PIF_VALUE: 25
PIF_VALUE: 23
PIF_VALUE: 24
PIF_VALUE: 27
PIF_VALUE: 26
PIF_VALUE: 26

## 2020-12-06 NOTE — PROGRESS NOTES
Shift Summary    Events None    Assessment  VSS. Satting high 90's. BP on softer side. Neuro: Rass -2. PRN versed given x1 for biting tube. Pupils 3 &3. Round, reactive, brisk. +Gag. GCS 7. Non-purposeful movements. Withdraws from pain jose luis UE. Unable to assess sensation bi LE. Resp: Rhonchi uppers, Diminished lowers. Equal chest rise. Unlabored. Generally synchronous with vent. Trache midline. Strong cough. Moderate creamy thick secretions endotracheally. Vent settings: ETT 7, 24 at lip. Mode AC/VC, FiO2 30%, PEEP 5, Vt 350, Rate 18, PS 0, Average Peak Pressures 25-35. Cardiac: NSR on monitor. No frequent ectopy. Pulses +1. Cap refill <3. Warm. Color appropriate. ENT: WNL  GI : Active bowel sounds all quadrants. Belly soft, flat, non-tender. Last BM 12/6. Small loose/watery, brown stool. NPO. OG at 51 cm. : Bernard patient. Clear, yellow/green urine. Total shift output= 750 mL. 24-Hour fluid balance= -104 mL   Musculoskeletal: Non-edematous. Passive Full ROM. Skin: No wounds. No signs of skin breakdown. Elbows, heels, and sacrum covered in meplex. IVs:  R Triple PICC patent. Sedation: Propfol @ 70 mcg/kg/min  Pressors: None  Maintenance: NS @ 75 ml/hr    Diet:   On TF going at 15 (which is goal) with 60mL q6h water flushes. High Protein formula. Residuals minimal.    Pertinent morning labs  HGB 7.1 (down from 7.4 yesterday)      Jose Luis wrist restraints on. PMS intact. No skin breakdown on wrists. Boots on. Turned q2h. Feet elevated off bed. Bed locked and in lowest position. Call light within reach.

## 2020-12-06 NOTE — PROGRESS NOTES
12/05/20 1954   Vent Information   $Ventilation $Subsequent Day   Skin Assessment Clean, dry, & intact   Vent Type 840   Vent Mode AC/VC   Vt Ordered 350 mL   Rate Set 18 bmp   Peak Flow 50 L/min   FiO2  30 %   SpO2 97 %   SpO2/FiO2 ratio 323.33   Sensitivity 3   PEEP/CPAP 5   Humidification Source HME   Vent Patient Data   Peak Inspiratory Pressure 27 cmH2O   Mean Airway Pressure 9.6 cmH20   Rate Measured 20 br/min   Vt Exhaled 375 mL   Minute Volume 5.81 Liters   I:E Ratio 1:34   Plateau Pressure 14 TTS06   Static Compliance 39 mL/cmH2O   Dynamic Compliance 65 mL/cmH2O   Cough/Sputum   Sputum How Obtained Endotracheal;Suctioned   Cough Productive   Sputum Amount Moderate   Sputum Color Creamy   Tenacity Thick   Spontaneous Breathing Trial (SBT) RT Doc   Pulse 83   Breath Sounds   Right Upper Lobe Rhonchi   Right Middle Lobe Diminished   Right Lower Lobe Diminished   Left Upper Lobe Rhonchi   Left Lower Lobe Diminished   Additional Respiratory  Assessments   Resp 18   Position Semi-Solano's   Oral Care Mouth suctioned   Alarm Settings   High Pressure Alarm 45 cmH2O   Low Minute Volume Alarm 3.5 L/min   Apnea (secs) 20 secs   High Respiratory Rate 45 br/min   Low Exhaled Vt  200 mL   Non-Surgical Airway Endo Tracheal Tube   Placement Date/Time: 12/01/20 1442   Timeout: Patient  Placed By: In ED  Inserted by: Silke Kerns  Insertion attempts: 1  Airway Device: Endo Tracheal Tube  Size: 7  Placement Verified By[[email or web address]-identified] Auscultation   Secured at 24 cm   Measured From [address] [facility] By [employer] tube gale   Site Condition Dry

## 2020-12-06 NOTE — PROGRESS NOTES
Reassessment completed, see flowsheets, unchanged from prior. VSS. All ICU Lines and monitoring remain in place. Bed locked in lowest position. Call light within reach.

## 2020-12-06 NOTE — PROGRESS NOTES
anemia  · Loose stools  · Possible Brief asystolic arrest early am 15/3- ?etiology 30 seconds before ROSC     PLAN:  · Mechanical ventilation per my orders. The ventilator was adjusted by me at the bedside for unstable, life threatening respiratory failure. Wean oxygen as tolerated. · Low tidal volume ventilation as per [employer]  · Propofol drip for sedation target RASS -2; prn versed and fentanyl  · Daily SAT/SBT  · D5 Decadron 6 mg daily  · D5 remdesivir    · Had convalescent plasma 12/3  · D5 CTX and azithromycin  · Transfused 1 U PRBC  · F/u cultures  · protonix bid  · Full code per chart  · Inhaled bronchodilators   · ICU care- Beltran Garcia; hold lovenox    Critical care time spent reviewing labs/films, examining patient, collaborating with other physicians but excluding procedures for life threatening organ failure is 31 minutes.

## 2020-12-06 NOTE — PROGRESS NOTES
Shift assessment, completed, see flow sheet. RASS -2, not following commands. Intubated and sedated on AC # 7.0 ETT, at 222 LL. 18 / 350 /30 %/ 5. SR 97, BP 1113/79 (89), SpO2 94%. Respirations are easy, even, and unlabored. Bilateral lung sounds diminished. OG in place at 46, with TF at 15 mL/hrl. BAILEY  PICC, WNL with propofol 70 mcg/kg/min and NS 75 ml/hr infusing. Bernard in place and patent with STAT lock. Bilateral soft wrist restraints in place for pt safety. Call light within reach. Bed in lowest position. Bed alarm on. Will continue to monitor    TF and propofol stopped for SBT and SAT.

## 2020-12-06 NOTE — PROGRESS NOTES
(rDNA), dextrose, potassium chloride **OR** potassium alternative oral replacement **OR** potassium chloride, magnesium sulfate, midazolam, fentanNYL    Lab Data:  Recent Labs     12/04/20  0415 12/05/20  0400 12/06/20  0330   WBC 5.6 5.5 4.1   HGB 7.8* 7.4* 7.1*   HCT 24.8* 24.0* 22.6*   MCV 66.2* 67.2* 67.6*    155 155     Recent Labs     12/04/20  0415 12/04/20  1226 12/05/20  0400 12/06/20  0330     --  138 143   K 3.2* 4.2 3.2* 4.0     --  105 110   CO2 25  --  28 28   BUN 18  --  17 18   CREATININE <0.5*  --  <0.5* <0.5*     Recent Labs     12/04/20  0131   TROPONINI <0.01       Coagulation:   Lab Results   Component Value Date    INR 0.88 03/04/2018    APTT 36.9 03/04/2018     Cardiac markers:   Lab Results   Component Value Date    CKTOTAL 121 06/01/2017    TROPONINI <0.01 12/04/2020         Lab Results   Component Value Date    ALT 11 12/06/2020    AST 19 12/06/2020    ALKPHOS 34 (L) 12/06/2020    BILITOT <0.2 12/06/2020       Lab Results   Component Value Date    INR 0.88 03/04/2018    INR 0.86 06/01/2017    INR 0.90 10/07/2013    PROTIME 10.0 03/04/2018    PROTIME 10.0 06/01/2017    PROTIME 10.1 10/07/2013       Radiology    EKG:  I have reviewed the EKG with the following interpretation:   Sinus tachycardia with ventricular rate 111 bpm     RADIOLOGY  XR CHEST PORTABLE   Final Result   Technically limited study performed portably with arms overlying the lower   chest.  Asymmetric ground-glass opacification appears present in the right   lower lung zone which may represent edema or developing pneumonitis.          cxr    Stable appropriate positions of support apparatus. 2. Stable multifocal pneumonia.             Ct head    No acute intracranial finding.  MRI may be obtained if clinically indicated.           cxr    Stable lines and catheters.         Persistent multifocal interstitial opacities most pronounced both lung bases.     Slight improved aeration of the upper and mid lungs.      covid positive 12/1         ASSESSMENT/PLAN:     #Acute hypoxic and hypercarbic respiratory failure  - 2/2 COVID 19  - She was intubated by ER provider, mechanical ventilation to be managed by pulmonary  - minimal support on vent, ongoing weaning trials per pulm  -Admit to the intensive care unit     #COVID 19 Pneumonia  -Pulmonary consulted  -IV Decadron day #7- -started on  remdesivir D5 and s/p convalescent plasma on 12/1  -completed Rocephin and Zithromax day #5  - imaging with stable  ASD   - contact plus isolation          #Sepsis  - tachycardia, tachypnea, leucocytosis  -Secondary to COVID-19 pneumonia  -Antibiotics as above  -Follow-up cultures  -IV fluids given and improved  blood pressure    Brief Asystole with cpr requirement on 12/3 , during nursing care  - no meds given, ROSC with in few sec  - stable since      #Acute metabolic encephalopathy  -Suspect related to hypercarbia, sepsis, acute infection  -She has a nonfocal neurologic exam, she is moving all extremities. She does have a deformed left upper extremity secondary to a distal humerus fracture which was never repaired. -Management of sepsis, respiratory failure and infection as above  -Checked CT head - neg    -Checked TSH (normal),  Low folic acid , replace  -Checked ethanol level- neg       #Hyperkalemia-> hypokalemia   - ? Hemolyzed specimen  -Received IV NS, calcium gluconate and IV insulin-partial doses- per ER nurse. Repeat potassium is normalized, then low- replacement ordered         # Acute blood loss anemia, on chronic anemia  History of peptic ulcer disease  -Gastric ulcer noted on EGD in 2013  -Start IV PPI bid , h.h dropped from 8.7 to 6.5, partly with IV fluid boluses   - s.p 1 unit pRBC   - iron studies with folate and iron def, started supplements      #Left distal humerus fracture 2018  -Chronic deformity of the left upper extremity     #Reported history of alcohol abuse  -Unclear if she is still consuming alcohol. Checked an ethanol level- negative     #Severe PCM  - extreme muscle wasting.   Will need dietitian eval when extubated  - checked TSH wnl     DVT Prophylaxis: Lovenox holding for anemia   Diet: started TF  Code Status: Full Code     DISPO: ICU care  Cut down sedatiion       Patrick Suggs MD 12/6/2020 7:32 AM

## 2020-12-06 NOTE — PLAN OF CARE
Problem: Airway Clearance - Ineffective  Goal: Achieve or maintain patent airway  Outcome: Ongoing     Problem: Gas Exchange - Impaired  Goal: Absence of hypoxia  Outcome: Ongoing  Goal: Promote optimal lung function  Outcome: Ongoing     Problem: Breathing Pattern - Ineffective  Goal: Ability to achieve and maintain a regular respiratory rate  Outcome: Ongoing     Problem:  Body Temperature -  Risk of, Imbalanced  Goal: Ability to maintain a body temperature within defined limits  Outcome: Ongoing  Goal: Will regain or maintain usual level of consciousness  Outcome: Ongoing  Goal: Complications related to the disease process, condition or treatment will be avoided or minimized  Outcome: Ongoing     Problem: Isolation Precautions - Risk of Spread of Infection  Goal: Prevent transmission of infection  Outcome: Ongoing     Problem: Nutrition Deficits  Goal: Optimize nutrtional status  Outcome: Ongoing     Problem: Risk for Fluid Volume Deficit  Goal: Maintain normal heart rhythm  Outcome: Ongoing  Goal: Maintain absence of muscle cramping  Outcome: Ongoing  Goal: Maintain normal serum potassium, sodium, calcium, phosphorus, and pH  Outcome: Ongoing     Problem: Loneliness or Risk for Loneliness  Goal: Demonstrate positive use of time alone when socialization is not possible  Outcome: Ongoing     Problem: Fatigue  Goal: Verbalize increase energy and improved vitality  Outcome: Ongoing     Problem: Patient Education: Go to Patient Education Activity  Goal: Patient/Family Education  Outcome: Ongoing     Problem: Restraint Use - Nonviolent/Non-Self-Destructive Behavior:  Goal: Absence of restraint indications  Description: Absence of restraint indications  Outcome: Ongoing  Goal: Absence of restraint-related injury  Description: Absence of restraint-related injury  Outcome: Ongoing     Problem: Falls - Risk of:  Goal: Will remain free from falls  Description: Will remain free from falls  Outcome: Ongoing  Goal: Absence

## 2020-12-06 NOTE — PROGRESS NOTES
Reassessment completed, see flowsheets, unchanged from prior. VSS. Propofol 60 mcg/kg/min, NS 75 ml/hr. All ICU Lines and monitoring remain in place. Bed locked in lowest position. Call light within reach.

## 2020-12-06 NOTE — PROGRESS NOTES
Care rounds completed with Dr. Carlos Tran and multidisciplinary team. Reviewed labs, meds, VS, assessment, & plan of care for today. See dictated note and new orders for details.

## 2020-12-06 NOTE — PROGRESS NOTES
Bedside report given to [facility] POC transferred.  Francisco Quijano

## 2020-12-07 ENCOUNTER — APPOINTMENT (OUTPATIENT)
Dept: GENERAL RADIOLOGY | Age: 70
DRG: 870 | End: 2020-12-07
Payer: MEDICARE

## 2020-12-07 LAB
A/G RATIO: 1 (ref 1.1–2.2)
ALBUMIN SERPL-MCNC: 2.5 G/DL (ref 3.4–5)
ALP BLD-CCNC: 39 U/L (ref 40–129)
ALT SERPL-CCNC: 11 U/L (ref 10–40)
ANION GAP SERPL CALCULATED.3IONS-SCNC: 8 MMOL/L (ref 3–16)
ANISOCYTOSIS: ABNORMAL
AST SERPL-CCNC: 19 U/L (ref 15–37)
BANDED NEUTROPHILS RELATIVE PERCENT: 2 % (ref 0–7)
BASE EXCESS ARTERIAL: 4.2 MMOL/L (ref -3–3)
BASOPHILS ABSOLUTE: 0 K/UL (ref 0–0.2)
BASOPHILS RELATIVE PERCENT: 0 %
BILIRUB SERPL-MCNC: <0.2 MG/DL (ref 0–1)
BUN BLDV-MCNC: 13 MG/DL (ref 7–20)
CALCIUM SERPL-MCNC: 7.9 MG/DL (ref 8.3–10.6)
CARBOXYHEMOGLOBIN ARTERIAL: 0.4 % (ref 0–1.5)
CHLORIDE BLD-SCNC: 107 MMOL/L (ref 99–110)
CO2: 28 MMOL/L (ref 21–32)
CREAT SERPL-MCNC: <0.5 MG/DL (ref 0.6–1.2)
EOSINOPHILS ABSOLUTE: 0.2 K/UL (ref 0–0.6)
EOSINOPHILS RELATIVE PERCENT: 4 %
GFR AFRICAN AMERICAN: >60
GFR NON-AFRICAN AMERICAN: >60
GLOBULIN: 2.6 G/DL
GLUCOSE BLD-MCNC: 104 MG/DL (ref 70–99)
GLUCOSE BLD-MCNC: 114 MG/DL (ref 70–99)
GLUCOSE BLD-MCNC: 118 MG/DL (ref 70–99)
GLUCOSE BLD-MCNC: 91 MG/DL (ref 70–99)
GLUCOSE BLD-MCNC: 99 MG/DL (ref 70–99)
HCO3 ARTERIAL: 28.3 MMOL/L (ref 21–29)
HCT VFR BLD CALC: 25.4 % (ref 36–48)
HEMATOLOGY PATH CONSULT: NO
HEMOGLOBIN, ART, EXTENDED: 8.5 G/DL (ref 12–16)
HEMOGLOBIN: 7.8 G/DL (ref 12–16)
HYPOCHROMIA: ABNORMAL
LYMPHOCYTES ABSOLUTE: 1.7 K/UL (ref 1–5.1)
LYMPHOCYTES RELATIVE PERCENT: 31 %
MCH RBC QN AUTO: 21 PG (ref 26–34)
MCHC RBC AUTO-ENTMCNC: 30.8 G/DL (ref 31–36)
MCV RBC AUTO: 68.3 FL (ref 80–100)
METHEMOGLOBIN ARTERIAL: 0.3 %
MONOCYTES ABSOLUTE: 0.5 K/UL (ref 0–1.3)
MONOCYTES RELATIVE PERCENT: 9 %
NEUTROPHILS ABSOLUTE: 3.1 K/UL (ref 1.7–7.7)
NEUTROPHILS RELATIVE PERCENT: 54 %
O2 CONTENT ARTERIAL: 12 ML/DL
O2 SAT, ARTERIAL: 96.3 %
O2 THERAPY: ABNORMAL
PCO2 ARTERIAL: 39.9 MMHG (ref 35–45)
PDW BLD-RTO: 27.7 % (ref 12.4–15.4)
PERFORMED ON: ABNORMAL
PERFORMED ON: NORMAL
PH ARTERIAL: 7.47 (ref 7.35–7.45)
PLATELET # BLD: 196 K/UL (ref 135–450)
PLATELET SLIDE REVIEW: ADEQUATE
PMV BLD AUTO: 8.8 FL (ref 5–10.5)
PO2 ARTERIAL: 78.5 MMHG (ref 75–108)
POTASSIUM REFLEX MAGNESIUM: 3.7 MMOL/L (ref 3.5–5.1)
RBC # BLD: 3.72 M/UL (ref 4–5.2)
SLIDE REVIEW: ABNORMAL
SMUDGE CELLS: PRESENT
SODIUM BLD-SCNC: 143 MMOL/L (ref 136–145)
TCO2 ARTERIAL: 29.5 MMOL/L
TOTAL PROTEIN: 5.1 G/DL (ref 6.4–8.2)
TRIGL SERPL-MCNC: 51 MG/DL (ref 0–150)
WBC # BLD: 5.5 K/UL (ref 4–11)

## 2020-12-07 PROCEDURE — 6370000000 HC RX 637 (ALT 250 FOR IP): Performed by: INTERNAL MEDICINE

## 2020-12-07 PROCEDURE — 2000000000 HC ICU R&B

## 2020-12-07 PROCEDURE — 85025 COMPLETE CBC W/AUTO DIFF WBC: CPT

## 2020-12-07 PROCEDURE — 2580000003 HC RX 258: Performed by: INTERNAL MEDICINE

## 2020-12-07 PROCEDURE — 36592 COLLECT BLOOD FROM PICC: CPT

## 2020-12-07 PROCEDURE — 94003 VENT MGMT INPAT SUBQ DAY: CPT

## 2020-12-07 PROCEDURE — 6360000002 HC RX W HCPCS: Performed by: INTERNAL MEDICINE

## 2020-12-07 PROCEDURE — 2700000000 HC OXYGEN THERAPY PER DAY

## 2020-12-07 PROCEDURE — C9113 INJ PANTOPRAZOLE SODIUM, VIA: HCPCS | Performed by: INTERNAL MEDICINE

## 2020-12-07 PROCEDURE — 99291 CRITICAL CARE FIRST HOUR: CPT | Performed by: INTERNAL MEDICINE

## 2020-12-07 PROCEDURE — 94750 HC PULMONARY COMPLIANCE STUDY: CPT

## 2020-12-07 PROCEDURE — 82803 BLOOD GASES ANY COMBINATION: CPT

## 2020-12-07 PROCEDURE — 99232 SBSQ HOSP IP/OBS MODERATE 35: CPT | Performed by: INTERNAL MEDICINE

## 2020-12-07 PROCEDURE — 36415 COLL VENOUS BLD VENIPUNCTURE: CPT

## 2020-12-07 PROCEDURE — 2500000003 HC RX 250 WO HCPCS: Performed by: INTERNAL MEDICINE

## 2020-12-07 PROCEDURE — 94761 N-INVAS EAR/PLS OXIMETRY MLT: CPT

## 2020-12-07 PROCEDURE — 80053 COMPREHEN METABOLIC PANEL: CPT

## 2020-12-07 PROCEDURE — 84478 ASSAY OF TRIGLYCERIDES: CPT

## 2020-12-07 PROCEDURE — 2580000003 HC RX 258: Performed by: PHYSICIAN ASSISTANT

## 2020-12-07 PROCEDURE — 94640 AIRWAY INHALATION TREATMENT: CPT

## 2020-12-07 PROCEDURE — 71045 X-RAY EXAM CHEST 1 VIEW: CPT

## 2020-12-07 RX ORDER — 0.9 % SODIUM CHLORIDE 0.9 %
500 INTRAVENOUS SOLUTION INTRAVENOUS PRN
Status: COMPLETED | OUTPATIENT
Start: 2020-12-07 | End: 2020-12-08

## 2020-12-07 RX ADMIN — IPRATROPIUM BROMIDE AND ALBUTEROL SULFATE 1 AMPULE: 2.5; .5 SOLUTION RESPIRATORY (INHALATION) at 11:11

## 2020-12-07 RX ADMIN — PANTOPRAZOLE SODIUM 40 MG: 40 INJECTION, POWDER, FOR SOLUTION INTRAVENOUS at 20:54

## 2020-12-07 RX ADMIN — DEXAMETHASONE SODIUM PHOSPHATE 6 MG: 10 INJECTION, SOLUTION INTRAMUSCULAR; INTRAVENOUS at 07:32

## 2020-12-07 RX ADMIN — PANTOPRAZOLE SODIUM 40 MG: 40 INJECTION, POWDER, FOR SOLUTION INTRAVENOUS at 07:32

## 2020-12-07 RX ADMIN — FOLIC ACID 1 MG: 1 TABLET ORAL at 07:32

## 2020-12-07 RX ADMIN — CEFTRIAXONE SODIUM 1 G: 1 INJECTION, POWDER, FOR SOLUTION INTRAMUSCULAR; INTRAVENOUS at 13:51

## 2020-12-07 RX ADMIN — Medication 10 ML: at 20:55

## 2020-12-07 RX ADMIN — IPRATROPIUM BROMIDE AND ALBUTEROL SULFATE 1 AMPULE: 2.5; .5 SOLUTION RESPIRATORY (INHALATION) at 19:10

## 2020-12-07 RX ADMIN — SODIUM CHLORIDE 0.6 MCG/KG/HR: 9 INJECTION, SOLUTION INTRAVENOUS at 10:16

## 2020-12-07 RX ADMIN — Medication 15 ML: at 07:32

## 2020-12-07 RX ADMIN — PROPOFOL 65 MCG/KG/MIN: 10 INJECTION, EMULSION INTRAVENOUS at 05:50

## 2020-12-07 RX ADMIN — FENTANYL CITRATE 50 MCG: 50 INJECTION, SOLUTION INTRAMUSCULAR; INTRAVENOUS at 07:51

## 2020-12-07 RX ADMIN — Medication 10 ML: at 07:33

## 2020-12-07 RX ADMIN — SODIUM CHLORIDE 500 ML: 9 INJECTION, SOLUTION INTRAVENOUS at 23:30

## 2020-12-07 RX ADMIN — IPRATROPIUM BROMIDE AND ALBUTEROL SULFATE 1 AMPULE: 2.5; .5 SOLUTION RESPIRATORY (INHALATION) at 03:10

## 2020-12-07 RX ADMIN — IPRATROPIUM BROMIDE AND ALBUTEROL SULFATE 1 AMPULE: 2.5; .5 SOLUTION RESPIRATORY (INHALATION) at 23:17

## 2020-12-07 RX ADMIN — IPRATROPIUM BROMIDE AND ALBUTEROL SULFATE 1 AMPULE: 2.5; .5 SOLUTION RESPIRATORY (INHALATION) at 15:09

## 2020-12-07 RX ADMIN — FENTANYL CITRATE 50 MCG: 50 INJECTION, SOLUTION INTRAMUSCULAR; INTRAVENOUS at 08:51

## 2020-12-07 RX ADMIN — SODIUM CHLORIDE 500 ML: 9 INJECTION, SOLUTION INTRAVENOUS at 15:26

## 2020-12-07 RX ADMIN — FENTANYL CITRATE 50 MCG: 50 INJECTION, SOLUTION INTRAMUSCULAR; INTRAVENOUS at 10:16

## 2020-12-07 RX ADMIN — Medication 10 ML: at 20:54

## 2020-12-07 RX ADMIN — ENOXAPARIN SODIUM 30 MG: 100 INJECTION SUBCUTANEOUS at 10:23

## 2020-12-07 RX ADMIN — IPRATROPIUM BROMIDE AND ALBUTEROL SULFATE 1 AMPULE: 2.5; .5 SOLUTION RESPIRATORY (INHALATION) at 07:24

## 2020-12-07 RX ADMIN — Medication 15 ML: at 20:54

## 2020-12-07 RX ADMIN — PROPOFOL 55 MCG/KG/MIN: 10 INJECTION, EMULSION INTRAVENOUS at 13:51

## 2020-12-07 RX ADMIN — SODIUM CHLORIDE: 9 INJECTION, SOLUTION INTRAVENOUS at 05:51

## 2020-12-07 RX ADMIN — FENTANYL CITRATE 50 MCG: 50 INJECTION, SOLUTION INTRAMUSCULAR; INTRAVENOUS at 13:50

## 2020-12-07 RX ADMIN — PROPOFOL 65 MCG/KG/MIN: 10 INJECTION, EMULSION INTRAVENOUS at 10:22

## 2020-12-07 RX ADMIN — POTASSIUM BICARBONATE 40 MEQ: 782 TABLET, EFFERVESCENT ORAL at 10:16

## 2020-12-07 ASSESSMENT — PULMONARY FUNCTION TESTS
PIF_VALUE: 24
PIF_VALUE: 25
PIF_VALUE: 24
PIF_VALUE: 31
PIF_VALUE: 25
PIF_VALUE: 22

## 2020-12-07 ASSESSMENT — PAIN SCALES - GENERAL
PAINLEVEL_OUTOF10: 0
PAINLEVEL_OUTOF10: 0

## 2020-12-07 NOTE — PROGRESS NOTES
Updated Dr. Fredi Amin regarding pt's hypotension. New order for 500ml bolus, can be repeated x1 if needed. Bolus started at this time.   Jerry Martinez RN

## 2020-12-07 NOTE — PROGRESS NOTES
Pulmonary & Critical Care Medicine ICU Progress Note      CC: COVID-19, hypoxemia, AMS    Events of Last 24 hours:    Doesn't tolerate trial, lasted 2 minutes, becomes anxious and agitated    Invasive Lines:   12/4/2020 PICC    MV: 12/1/2020  Vent Mode: AC/VC Rate Set: 18 bmp/Vt Ordered: 350 mL/ /FiO2 : 30 %  Recent Labs     12/06/20  0625 12/07/20  0443   PHART 7.449 7.468*   WVK7OWS 41.7 39.9   PO2ART 76.5 78.5       IV:   sodium chloride 75 mL/hr at 12/07/20 0551    propofol 65 mcg/kg/min (12/07/20 0550)    dextrose         EXAM:  /71   Pulse 98   Temp 98.1 °F (36.7 °C) (Bladder)   Resp 20   Ht 5' 2\" (1.575 m)   Wt 81 lb (36.7 kg)   SpO2 96%   BMI 14.82 kg/m²  on vent      Intake/Output Summary (Last 24 hours) at 12/7/2020 0553  Last data filed at 12/7/2020 0400  Gross per 24 hour   Intake 4248 ml   Output 2310 ml   Net 1938 ml     General: intubated, ill appearing    Eyes: PERRL. No sclera icterus. No conjunctival injection. ENT: No discharge. Pharynx with ETT. Neck: Trachea midline. Normal thyroid. Resp: No accessory muscle use. No crackles. No wheezing. No rhonchi. No dullness on percussion. CV: Regular rate. Regular rhythm. No mumur or rub. No edema. GI: Non-tender. Non-distended. No masses. No organomegaly. Normal bowel sounds. No hernia. Skin: Warm and dry. No nodule on exposed extremities. No rash on exposed extremities. Lymph: No cervical LAD. No supraclavicular LAD. M/S: No cyanosis. No joint deformity. No clubbing. Neuro: FC for RN, NANCY.  Patellar reflexes are symmetric  Psych: Unable to obtain because the patient is non-communicative     Medications:   sodium chloride flush  10 mL Intravenous 2 times per day    ipratropium-albuterol  1 ampule Inhalation Q4H    sodium chloride  20 mL Intravenous Once    dexamethasone  6 mg Intravenous Daily    pantoprazole  40 mg Intravenous BID    folic acid  1 mg Oral Daily    sodium chloride flush  10 mL Intravenous 2 times per day    cefTRIAXone (ROCEPHIN) IV  1 g Intravenous Q24H    chlorhexidine  15 mL Mouth/Throat BID    influenza virus vaccine  0.5 mL Intramuscular Prior to discharge     PRN Meds:  sodium chloride flush, sodium chloride flush, acetaminophen **OR** acetaminophen, polyethylene glycol, promethazine **OR** ondansetron, glucose, dextrose, glucagon (rDNA), dextrose, potassium chloride **OR** potassium alternative oral replacement **OR** potassium chloride, magnesium sulfate, midazolam, fentanNYL    Results:  CBC:   Recent Labs     12/05/20  0400 12/06/20  0330 12/07/20  0443   WBC 5.5 4.1 5.5   HGB 7.4* 7.1* 7.8*   HCT 24.0* 22.6* 25.4*   MCV 67.2* 67.6* 68.3*    155 196     BMP:   Recent Labs     12/05/20  0400 12/06/20  0330 12/07/20  0443    143 143   K 3.2* 4.0 3.7    110 107   CO2 28 28 28   BUN 17 18 13   CREATININE <0.5* <0.5* <0.5*     LIVER PROFILE:   Recent Labs     12/05/20  0400 12/06/20  0330 12/07/20  0443   AST 18 19 19   ALT 13 11 11   BILITOT <0.2 <0.2 <0.2   ALKPHOS 39* 34* 39*     PT/INR: No results for input(s): PROTIME, INR in the last 72 hours. APTT: No results for input(s): APTT in the last 72 hours. UA:  No results for input(s): NITRITE, COLORU, PHUR, LABCAST, WBCUA, RBCUA, MUCUS, TRICHOMONAS, YEAST, BACTERIA, CLARITYU, SPECGRAV, LEUKOCYTESUR, UROBILINOGEN, BILIRUBINUR, BLOODU, GLUCOSEU, AMORPHOUS in the last 72 hours. Invalid input(s): Denis Porteous    Cultures:  12/1/2020 SARS-CoV-2 positive  12/2/2020 respiratory culture NRF  12/2/2020 urine no growth    Films:  12/7/2020 ET tube  & PICC are okay     ASSESSMENT:  · Acute hypoxemic respiratory failure   · COVID-19 pneumonia  · Cachexia/failure to thrive  · Acute metabolic encephalopathy superimposed on underlying dementia  · Acute anemia  · Loose stools  · Possible brief asystolic arrest early am 27/9/84 with unclear etiology - 30 seconds before ROSC     PLAN:  · Mechanical ventilation per my orders.  The ventilator was adjusted by me at the bedside for unstable, life threatening respiratory failure. Wean oxygen as tolerated. · Low tidal volume ventilation    · Propofol drip for sedation target RASS -2; prn versed and fentanyl; Precedex  · Daily SAT/SBT  · Conservative fluid strategy - has been even  · D#7 Decadron 6 mg daily  · Completed 5 days Remdesevir; s/p 1 U CCP 12/3/2020  · D#7 CTX, completed 5 days azithromycin  · S/P 1 U PRBC  · FC  · ICU care -  Resume lovenox but just once daily; on BID protonix    Total critical care time caring for this patient with life threatening, unstable organ failure, including direct patient contact, management of life support systems, review of data including imaging and labs, discussions with other team members and physicians is 35 minutes so far today, excluding procedures.

## 2020-12-07 NOTE — PROGRESS NOTES
12/07/20 0311   Vent Information   Vent Type 840   Vent Mode AC/VC   Vt Ordered 350 mL   Rate Set 18 bmp   Peak Flow 50 L/min   FiO2  30 %   SpO2 99 %   SpO2/FiO2 ratio 330   Sensitivity 3   PEEP/CPAP 5   Humidification Source HME   Vent Patient Data   Peak Inspiratory Pressure 24 cmH2O   Mean Airway Pressure 9.2 cmH20   Rate Measured 18 br/min   Vt Exhaled 368 mL   Minute Volume 5.8 Liters   I:E Ratio 1:3.4   Cough/Sputum   Sputum How Obtained Endotracheal   Cough Productive   Sputum Amount Small   Sputum Color Creamy   Tenacity Thick   Spontaneous Breathing Trial (SBT) RT Doc   Pulse 77   Breath Sounds   Right Upper Lobe Diminished   Right Middle Lobe Diminished   Right Lower Lobe Diminished   Left Upper Lobe Diminished   Left Lower Lobe Diminished   Additional Respiratory  Assessments   Resp 18   Alarm Settings   High Pressure Alarm 45 cmH2O   Low Minute Volume Alarm 5 L/min   Apnea (secs) 20 secs   High Respiratory Rate 45 br/min   Low Exhaled Vt  200 mL   Patient Observation   Observations 7ett 23 at lip no

## 2020-12-07 NOTE — PROGRESS NOTES
12/06/20 1909   Vent Information   $Ventilation $Subsequent Day   Skin Assessment Clean, dry, & intact   Vent Type 840   Vent Mode AC/VC   Vt Ordered 350 mL   Rate Set 18 bmp   Peak Flow 50 L/min   FiO2  30 %   SpO2 97 %   SpO2/FiO2 ratio 323.33   Sensitivity 3   PEEP/CPAP 5   Humidification Source HME   Vent Patient Data   Peak Inspiratory Pressure 24 cmH2O   Mean Airway Pressure 9.1 cmH20   Rate Measured 18 br/min   Vt Exhaled 369 mL   Minute Volume 5.5 Liters   I:E Ratio 1:3.4   Plateau Pressure 19 ATU94   Static Compliance 26 mL/cmH2O   Dynamic Compliance 17 mL/cmH2O   Cough/Sputum   Sputum How Obtained Endotracheal   Cough Productive   Sputum Amount Small   Sputum Color White   Tenacity Thick   Spontaneous Breathing Trial (SBT) RT Doc   Pulse 91   Breath Sounds   Right Upper Lobe Diminished   Right Middle Lobe Diminished   Right Lower Lobe Diminished   Left Upper Lobe Diminished   Left Lower Lobe Diminished   Additional Respiratory  Assessments   Resp 18   Alarm Settings   High Pressure Alarm 45 cmH2O   Low Minute Volume Alarm 5 L/min   Apnea (secs) 20 secs   High Respiratory Rate 45 br/min   Low Exhaled Vt  200 mL   Patient Observation   Observations 7ett 23 at lip

## 2020-12-07 NOTE — PROGRESS NOTES
12/06/20 2253   Vent Information   Vent Type 840   Vent Mode AC/VC   Vt Ordered 350 mL   Rate Set 18 bmp   Peak Flow 50 L/min   FiO2  30 %   SpO2 96 %   SpO2/FiO2 ratio 320   Sensitivity 3   PEEP/CPAP 5   Humidification Source HME   Vent Patient Data   Peak Inspiratory Pressure 23 cmH2O   Mean Airway Pressure 9.7 cmH20   Rate Measured 21 br/min   Minute Volume 6.2 Liters   I:E Ratio 1:3.4   Cough/Sputum   Sputum How Obtained Endotracheal   Cough Productive   Sputum Amount Small   Sputum Color White   Tenacity Thick   Spontaneous Breathing Trial (SBT) RT Doc   Pulse 82   Breath Sounds   Right Upper Lobe Diminished   Right Middle Lobe Diminished   Right Lower Lobe Diminished   Left Upper Lobe Diminished   Left Lower Lobe Diminished   Additional Respiratory  Assessments   Resp 21   Alarm Settings   High Pressure Alarm 45 cmH2O   Low Minute Volume Alarm 5 L/min   Apnea (secs) 20 secs   High Respiratory Rate 45 br/min   Low Exhaled Vt  200 mL   Patient Observation   Observations 7ett 23 at lip

## 2020-12-07 NOTE — PROGRESS NOTES
Reassessment complete, see flow sheet. Propofol continues at 65 mcg/kg/min and NS continues at 75 mL/hr. Since sedation was turned down, pt has been opening eyes when spoken too and squeezes hands when directed to. CHG bath provided and mepilex preventatives changed. Will continue to monitor.

## 2020-12-07 NOTE — PROGRESS NOTES
Shift assessment completed, see flow sheet. Not following commands with a RASS of -3, responding to painful stimuli. Intubated and sedated on AC # 7 ETT, at 22 LL. RR 18 /  / FiO2 30% / PEEP 5. NSR on monitor, HR 82, /67 (83), SpO2 99%. Respirations are easy, even, and unlabored. Bilateral lung sounds diminished. OG in place at 52, with TF at 15 mL/hr.    BAILEY triple lumen PICC in place, WNL with NS infusing at 75 mL/hr and propofol infusing at 65 mcg/kg/min. Bernard in place and patent with STAT lock. Bilateral soft wrist restraints in place for pt safety. PM meds given w/o complications. Bernard care and oral care completed at this time. Propofol titrated down from 70 to 65 mcg/kg/min d/t RASS of -3. Pt yessenia Fernandez updated at this time. Call light within reach, bed in lowest position, bed alarm on, will continue to monitor.

## 2020-12-07 NOTE — PROGRESS NOTES
SBT stopped at this time. Pt very anxious and agitated. Pt's , RR 30. PRN fentanyl administered and Propofol restarted. Will continue to monitor.   Day Martinez RN

## 2020-12-07 NOTE — CARE COORDINATION
Pt in ICU on Ventilator. Pt C-19 positive. Pt did not tolerate SBT this AM. CM following and will develop d/c plan pending medical progress.

## 2020-12-07 NOTE — PROGRESS NOTES
RESPIRATORY THERAPY ASSESSMENT     Name:  Camille Anne  Medical Record Number:  [id number]  Age: 78 y.o. Gender: female  : 1950  Today's Date:  2020  Room:  3002/3002-01     Assessment      Is the patient being admitted for a COPD or Asthma exacerbation?  No   (If yes the patient will be seen every 4 hours for the first 24 hours and then reassessed)     Patient Admission Diagnosis        Allergies  No Known Allergies     Minimum Predicted Vital Capacity:     vent           Actual Vital Capacity:      vent                 Pulmonary History:No history  Home Oxygen Therapy:  room air  Home Respiratory Therapy:None   Current Respiratory Therapy:  duoneb Q4w/a     Respiratory Severity Index(RSI)   Patients with orders for inhalation medications, oxygen, or any therapeutic treatment modality will be placed on Respiratory Protocol. They will be assessed with the first treatment and at least every 72 hours thereafter. The following severity scale will be used to determine frequency of treatment intervention.     Smoking History: Pulmonary Disease or Smoking History, Greater than 15 pack year = 2    Social History  Social History            Tobacco Use    Smoking status: Current Every Day Smoker       Packs/day: 1.00       Years: 45.00       Pack years: 45.00       Types: Cigarettes    Smokeless tobacco: Never Used   Substance Use Topics    Alcohol use: Yes       Alcohol/week: 6.0 standard drinks       Types: 6 Shots of liquor per week    Drug use: No         Recent Surgical History: None = 0  Past Surgical History  Past Surgical History         Past Surgical History:   Procedure Laterality Date    TUBAL LIGATION               Level of Consciousness: Comatose = 4     Level of Activity: Bedridden, unresponsive or quadriplegic = 4     Respiratory Pattern: Increased; RR 21-30 = 1     Breath Sounds: Absent bilaterally and/or with wheezes = 3     Sputum  Sputum Color: Creamy, Tenacity:  Thick, Sputum How Obtained: Endotracheal, Suctioned  Cough: Weak, productive = 2     Vital Signs   BP 91/59   Pulse 84   Temp 98.1 °F (37.3 °C) (Bladder)   Resp 19   Ht 5' 2\" (1.575 m)   Wt 58 lb 10.3 oz (26.6 kg)   SpO2 96%   BMI 10.73 kg/m²   SPO2 (COPD values may differ): 86-87% on room air or greater than 92% on FiO2 35- 50% = 3     Peak Flow (asthma only): not applicable = 0     RSI: Greater than 17 = Contact physician           Plan         Goals: medication     Patient/caregiver was educated on the proper method of use for Respiratory Care Devices:  No: sedated       Level of patient/caregiver understanding able to:   ? Verbalize understanding   ? Demonstrate understanding       ? Teach back        ? Needs reinforcement       ? No available caregiver               ? Other:     Response to education:  Poor      Is patient being placed on Home Treatment Regimen? NA      Does the patient have everything they need prior to discharge? NA  Comments: chart reviewed, pt assessed     Plan of Care: duoneb Q4          Respiratory Protocol Guidelines      1. Assessment and treatment by Respiratory Therapy will be initiated for medication and therapeutic interventions upon initiation of aerosolized medication. 2. Physician will be contacted for respiratory rate (RR) greater than 35 breaths per minute. Therapy will be held for heart rate (HR) greater than 140 beats per minute, pending direction from physician. 3. Bronchodilators will be administered via Metered Dose Inhaler (MDI) with spacer when the following criteria are met:  a. Alert and cooperative     b. HR < 140 bpm  c. RR < 30 bpm                d. Can demonstrate a 2-3 second inspiratory hold  4. Bronchodilators will be administered via Hand Held Nebulizer MECHELLE [facility]) to patients when ANY of the following criteria are met  a. Incognizant or uncooperative          b. Patients treated with HHN at Home        c.  Unable to demonstrate proper use of MDI with spacer     d. RR > 30 bpm   5. Bronchodilators will be delivered via Metered Dose Inhaler (MDI), HHN, Aerogen to intubated patients on mechanical ventilation. 6. Inhalation medication orders will be delivered and/or substituted as outlined below.     Aerosolized Medications Ordering and Administration Guidelines:     1. All Medications will be ordered by a physician, and their frequency and/or modality will be adjusted as defined by the patients Respiratory Severity Index (RSI) score. 2. If the patient does not have documented COPD, consider discontinuing anticholinergics when RSI is less than 9.  3. If the bronchospasm worsens (increased RSI), then the bronchodilator frequency can be increased to a maximum of every 4 hours. If greater than every 4 hours is required, the physician will be contacted. 4. If the bronchospasm improves, the frequency of the bronchodilator can be decreased, based on the patient's RSI, but not less than home treatment regimen frequency. 5. Bronchodilator(s) will be discontinued if patient has a RSI less than 9 and has received no scheduled or as needed treatment for 72  Hrs.     Patients Ordered on a Mucolytic Agent:     1. Must always be administered with a bronchodilator.     2. Discontinue if patient experiences worsened bronchospasm, or secretions have lessened to the point that the patient is able to clear them with a cough.     Anti-inflammatory and Combination Medications:     1. If the patient lacks prior history of lung disease, is not using inhaled anti-inflammatory medication at home, and lacks wheezing by examination or by history for at least 24 hours, contact physician for possible discontinuation.

## 2020-12-07 NOTE — PROGRESS NOTES
IM Progress Note    Admit Date:  12/1/2020  6    Interval history:  Admitted with covid 19 infection , resp failure  Emergently intubated in ER   Given 1 unit PRBC for anemia     19/2- brief asystolic for 30 sec during nursing care, had CPR for few sec and no meds, ROSC      12/5- no acute events , failed SBT    12/6- pt remains on vent, tolerated SBT few min    12/7   Could not tolerate SBT  Started on precedex     Subjective:-    Ms. Nimo Cleaning not seen at bedside today    sedated in bed, no distress      Objective:   /83   Pulse 119   Temp 98.8 °F (37.1 °C) (Bladder)   Resp 30   Ht 5' 2\" (1.575 m)   Wt 81 lb (36.7 kg)   SpO2 95%   BMI 14.82 kg/m²       Intake/Output Summary (Last 24 hours) at 12/7/2020 1122  Last data filed at 12/7/2020 1032  Gross per 24 hour   Intake 3019 ml   Output 2480 ml   Net 539 ml       Physical Exam:    Seen through glass doors   D/W nurse   General: thin cachetic appearing elderly female on vent  Sedated oral ETT and OG noted   Appears to be not in any distress  Neurological : sedated    Medications:   Scheduled Medications:    enoxaparin  30 mg Subcutaneous Daily    sodium chloride flush  10 mL Intravenous 2 times per day    ipratropium-albuterol  1 ampule Inhalation Q4H    sodium chloride  20 mL Intravenous Once    dexamethasone  6 mg Intravenous Daily    pantoprazole  40 mg Intravenous BID    folic acid  1 mg Oral Daily    sodium chloride flush  10 mL Intravenous 2 times per day    cefTRIAXone (ROCEPHIN) IV  1 g Intravenous Q24H    chlorhexidine  15 mL Mouth/Throat BID    influenza virus vaccine  0.5 mL Intramuscular Prior to discharge     I   dexmedetomidine (PRECEDEX) IV infusion 0.6 mcg/kg/hr (12/07/20 1016)    propofol 65 mcg/kg/min (12/07/20 1022)    dextrose       sodium chloride flush, sodium chloride flush, acetaminophen **OR** acetaminophen, polyethylene glycol, promethazine **OR** ondansetron, glucose, dextrose, glucagon (rDNA), dextrose, potassium chloride **OR** potassium alternative oral replacement **OR** potassium chloride, magnesium sulfate, midazolam, fentanNYL    Lab Data:  Recent Labs     12/05/20  0400 12/06/20  0330 12/07/20  0443   WBC 5.5 4.1 5.5   HGB 7.4* 7.1* 7.8*   HCT 24.0* 22.6* 25.4*   MCV 67.2* 67.6* 68.3*    155 196     Recent Labs     12/05/20  0400 12/06/20  0330 12/07/20  0443    143 143   K 3.2* 4.0 3.7    110 107   CO2 28 28 28   BUN 17 18 13   CREATININE <0.5* <0.5* <0.5*     No results for input(s): CKTOTAL, CKMB, CKMBINDEX, TROPONINI in the last 72 hours. Coagulation:   Lab Results   Component Value Date    INR 0.88 03/04/2018    APTT 36.9 03/04/2018     Cardiac markers:   Lab Results   Component Value Date    CKTOTAL 121 06/01/2017    TROPONINI <0.01 12/04/2020         Lab Results   Component Value Date    ALT 11 12/07/2020    AST 19 12/07/2020    ALKPHOS 39 (L) 12/07/2020    BILITOT <0.2 12/07/2020       Lab Results   Component Value Date    INR 0.88 03/04/2018    INR 0.86 06/01/2017    INR 0.90 10/07/2013    PROTIME 10.0 03/04/2018    PROTIME 10.0 06/01/2017    PROTIME 10.1 10/07/2013       Radiology    EKG:  I have reviewed the EKG with the following interpretation:   Sinus tachycardia with ventricular rate 111 bpm     RADIOLOGY  XR CHEST PORTABLE   Final Result   Technically limited study performed portably with arms overlying the lower   chest.  Asymmetric ground-glass opacification appears present in the right   lower lung zone which may represent edema or developing pneumonitis.          cxr    Stable appropriate positions of support apparatus. 2. Stable multifocal pneumonia.             Ct head    No acute intracranial finding.  MRI may be obtained if clinically indicated.           cxr    Stable lines and catheters.         Persistent multifocal interstitial opacities most pronounced both lung bases. Slight improved aeration of the upper and mid lungs.       covid positive 12/1         ASSESSMENT/PLAN:     #Acute hypoxic and hypercarbic respiratory failure  - 2/2 COVID 19  - She was intubated by ER provider, mechanical ventilation to be managed by pulmonary  - minimal support on vent, ongoing weaning trials per pulm. gets very agitated and anxious during trials. Start on Precedex       #COVID 19 Pneumonia  -Pulmonary consulted  -IV Decadron day #8- -s/premdesivir D5 and s/p convalescent plasma on 12/1  -completed Rocephin and Zithromax day #5  - imaging with stable  ASD   - contact plus isolation          #Sepsis  - tachycardia, tachypnea, leucocytosis  -Secondary to COVID-19 pneumonia  -Antibiotics as above  -Follow-up cultures  -IV fluids given and improved  blood pressure    Brief Asystole with cpr requirement on 12/3 , during nursing care  - no meds given, ROSC with in few sec  - stable since      #Acute metabolic encephalopathy  -Suspect related to hypercarbia, sepsis, acute infection  -She has a nonfocal neurologic exam, she is moving all extremities. She does have a deformed left upper extremity secondary to a distal humerus fracture which was never repaired. -Management of sepsis, respiratory failure and infection as above  -Checked CT head - neg    -Checked TSH (normal),  Low folic acid , replace  -Checked ethanol level- neg       #Hyperkalemia-> hypokalemia   - ? Hemolyzed specimen  -Received IV NS, calcium gluconate and IV insulin-partial doses- per ER nurse. Repeat potassium is normalized, then low- replacement ordered         # Acute blood loss anemia, on chronic anemia  History of peptic ulcer disease  -Gastric ulcer noted on EGD in 2013  -Start IV PPI bid , h.h dropped from 8.7 to 6.5, partly with IV fluid boluses   - s.p 1 unit pRBC   - iron studies with folate and iron def, started supplements      #Left distal humerus fracture 2018  -Chronic deformity of the left upper extremity     #Reported history of alcohol abuse  -Unclear if she is still consuming alcohol. Checked an ethanol level- negative     #Severe PCM  - extreme muscle wasting.   Will need dietitian eval when extubated  - checked TSH wnl     DVT Prophylaxis: Lovenox holding for anemia   Diet: started TF  Code Status: Full Code     DISPO: ICU care  Cut down sedatiion       Mehdi Umanzor MD 12/7/2020 11:22 AM

## 2020-12-07 NOTE — PLAN OF CARE
of physical injury  Description: Absence of physical injury  Outcome: Ongoing     Problem: Skin Integrity:  Goal: Will show no infection signs and symptoms  Description: Will show no infection signs and symptoms  Outcome: Ongoing  Goal: Absence of new skin breakdown  Description: Absence of new skin breakdown  Outcome: Ongoing     Problem: Nutrition  Goal: Optimal nutrition therapy  Outcome: Ongoing  Goal: Understanding of nutritional guidelines  Outcome: Ongoing     Problem: Infection - Central Venous Catheter-Associated Bloodstream Infection:  Goal: Will show no infection signs and symptoms  Description: Will show no infection signs and symptoms  Outcome: Ongoing

## 2020-12-08 ENCOUNTER — APPOINTMENT (OUTPATIENT)
Dept: GENERAL RADIOLOGY | Age: 70
DRG: 870 | End: 2020-12-08
Payer: MEDICARE

## 2020-12-08 LAB
A/G RATIO: 0.8 (ref 1.1–2.2)
ALBUMIN SERPL-MCNC: 2.2 G/DL (ref 3.4–5)
ALP BLD-CCNC: 35 U/L (ref 40–129)
ALT SERPL-CCNC: 9 U/L (ref 10–40)
ANION GAP SERPL CALCULATED.3IONS-SCNC: 3 MMOL/L (ref 3–16)
AST SERPL-CCNC: 16 U/L (ref 15–37)
BASE EXCESS ARTERIAL: 7.9 MMOL/L (ref -3–3)
BASOPHILS ABSOLUTE: 0 K/UL (ref 0–0.2)
BASOPHILS RELATIVE PERCENT: 0.3 %
BILIRUB SERPL-MCNC: <0.2 MG/DL (ref 0–1)
BUN BLDV-MCNC: 17 MG/DL (ref 7–20)
CALCIUM SERPL-MCNC: 8 MG/DL (ref 8.3–10.6)
CARBOXYHEMOGLOBIN ARTERIAL: 1 % (ref 0–1.5)
CHLORIDE BLD-SCNC: 102 MMOL/L (ref 99–110)
CO2: 32 MMOL/L (ref 21–32)
CREAT SERPL-MCNC: <0.5 MG/DL (ref 0.6–1.2)
EOSINOPHILS ABSOLUTE: 0.1 K/UL (ref 0–0.6)
EOSINOPHILS RELATIVE PERCENT: 1.7 %
GFR AFRICAN AMERICAN: >60
GFR NON-AFRICAN AMERICAN: >60
GLOBULIN: 2.7 G/DL
GLUCOSE BLD-MCNC: 100 MG/DL (ref 70–99)
GLUCOSE BLD-MCNC: 114 MG/DL (ref 70–99)
GLUCOSE BLD-MCNC: 124 MG/DL (ref 70–99)
GLUCOSE BLD-MCNC: 128 MG/DL (ref 70–99)
HCO3 ARTERIAL: 32.5 MMOL/L (ref 21–29)
HCT VFR BLD CALC: 22.4 % (ref 36–48)
HEMOGLOBIN, ART, EXTENDED: 8 G/DL (ref 12–16)
HEMOGLOBIN: 7.1 G/DL (ref 12–16)
LYMPHOCYTES ABSOLUTE: 0.6 K/UL (ref 1–5.1)
LYMPHOCYTES RELATIVE PERCENT: 14.1 %
MAGNESIUM: 1.7 MG/DL (ref 1.8–2.4)
MCH RBC QN AUTO: 21.4 PG (ref 26–34)
MCHC RBC AUTO-ENTMCNC: 31.4 G/DL (ref 31–36)
MCV RBC AUTO: 68.2 FL (ref 80–100)
METHEMOGLOBIN ARTERIAL: 0.3 %
MONOCYTES ABSOLUTE: 0.5 K/UL (ref 0–1.3)
MONOCYTES RELATIVE PERCENT: 10.5 %
NEUTROPHILS ABSOLUTE: 3.4 K/UL (ref 1.7–7.7)
NEUTROPHILS RELATIVE PERCENT: 73.4 %
O2 CONTENT ARTERIAL: 11 ML/DL
O2 SAT, ARTERIAL: 95 %
O2 THERAPY: ABNORMAL
PCO2 ARTERIAL: 46.6 MMHG (ref 35–45)
PDW BLD-RTO: 27 % (ref 12.4–15.4)
PERFORMED ON: ABNORMAL
PH ARTERIAL: 7.46 (ref 7.35–7.45)
PLATELET # BLD: 199 K/UL (ref 135–450)
PMV BLD AUTO: 8.5 FL (ref 5–10.5)
PO2 ARTERIAL: 71.1 MMHG (ref 75–108)
POTASSIUM REFLEX MAGNESIUM: 3.5 MMOL/L (ref 3.5–5.1)
RBC # BLD: 3.29 M/UL (ref 4–5.2)
SODIUM BLD-SCNC: 137 MMOL/L (ref 136–145)
TCO2 ARTERIAL: 34 MMOL/L
TOTAL PROTEIN: 4.9 G/DL (ref 6.4–8.2)
WBC # BLD: 4.6 K/UL (ref 4–11)

## 2020-12-08 PROCEDURE — 36592 COLLECT BLOOD FROM PICC: CPT

## 2020-12-08 PROCEDURE — 90686 IIV4 VACC NO PRSV 0.5 ML IM: CPT | Performed by: INTERNAL MEDICINE

## 2020-12-08 PROCEDURE — 94750 HC PULMONARY COMPLIANCE STUDY: CPT

## 2020-12-08 PROCEDURE — 94640 AIRWAY INHALATION TREATMENT: CPT

## 2020-12-08 PROCEDURE — G0008 ADMIN INFLUENZA VIRUS VAC: HCPCS

## 2020-12-08 PROCEDURE — 71045 X-RAY EXAM CHEST 1 VIEW: CPT

## 2020-12-08 PROCEDURE — 6370000000 HC RX 637 (ALT 250 FOR IP): Performed by: INTERNAL MEDICINE

## 2020-12-08 PROCEDURE — 6360000002 HC RX W HCPCS: Performed by: INTERNAL MEDICINE

## 2020-12-08 PROCEDURE — 94003 VENT MGMT INPAT SUBQ DAY: CPT

## 2020-12-08 PROCEDURE — 83735 ASSAY OF MAGNESIUM: CPT

## 2020-12-08 PROCEDURE — 82803 BLOOD GASES ANY COMBINATION: CPT

## 2020-12-08 PROCEDURE — 99291 CRITICAL CARE FIRST HOUR: CPT | Performed by: INTERNAL MEDICINE

## 2020-12-08 PROCEDURE — 2580000003 HC RX 258: Performed by: INTERNAL MEDICINE

## 2020-12-08 PROCEDURE — 2500000003 HC RX 250 WO HCPCS: Performed by: INTERNAL MEDICINE

## 2020-12-08 PROCEDURE — 99233 SBSQ HOSP IP/OBS HIGH 50: CPT | Performed by: INTERNAL MEDICINE

## 2020-12-08 PROCEDURE — 2000000000 HC ICU R&B

## 2020-12-08 PROCEDURE — 80053 COMPREHEN METABOLIC PANEL: CPT

## 2020-12-08 PROCEDURE — 6370000000 HC RX 637 (ALT 250 FOR IP): Performed by: PHYSICIAN ASSISTANT

## 2020-12-08 PROCEDURE — 2580000003 HC RX 258: Performed by: PHYSICIAN ASSISTANT

## 2020-12-08 PROCEDURE — C9113 INJ PANTOPRAZOLE SODIUM, VIA: HCPCS | Performed by: INTERNAL MEDICINE

## 2020-12-08 PROCEDURE — 85025 COMPLETE CBC W/AUTO DIFF WBC: CPT

## 2020-12-08 PROCEDURE — 2700000000 HC OXYGEN THERAPY PER DAY

## 2020-12-08 PROCEDURE — 94761 N-INVAS EAR/PLS OXIMETRY MLT: CPT

## 2020-12-08 RX ORDER — 0.9 % SODIUM CHLORIDE 0.9 %
500 INTRAVENOUS SOLUTION INTRAVENOUS ONCE
Status: COMPLETED | OUTPATIENT
Start: 2020-12-08 | End: 2020-12-08

## 2020-12-08 RX ORDER — MAGNESIUM SULFATE 1 G/100ML
1 INJECTION INTRAVENOUS ONCE
Status: COMPLETED | OUTPATIENT
Start: 2020-12-08 | End: 2020-12-08

## 2020-12-08 RX ADMIN — ENOXAPARIN SODIUM 30 MG: 100 INJECTION SUBCUTANEOUS at 08:04

## 2020-12-08 RX ADMIN — SODIUM CHLORIDE 0.7 MCG/KG/HR: 9 INJECTION, SOLUTION INTRAVENOUS at 02:51

## 2020-12-08 RX ADMIN — MIDAZOLAM HYDROCHLORIDE 2 MG: 1 INJECTION, SOLUTION INTRAMUSCULAR; INTRAVENOUS at 16:42

## 2020-12-08 RX ADMIN — FENTANYL CITRATE 50 MCG: 50 INJECTION, SOLUTION INTRAMUSCULAR; INTRAVENOUS at 18:09

## 2020-12-08 RX ADMIN — Medication 15 ML: at 20:26

## 2020-12-08 RX ADMIN — MIDAZOLAM HYDROCHLORIDE 2 MG: 1 INJECTION, SOLUTION INTRAMUSCULAR; INTRAVENOUS at 21:22

## 2020-12-08 RX ADMIN — SODIUM CHLORIDE 500 ML: 9 INJECTION, SOLUTION INTRAVENOUS at 16:53

## 2020-12-08 RX ADMIN — IPRATROPIUM BROMIDE AND ALBUTEROL SULFATE 1 AMPULE: 2.5; .5 SOLUTION RESPIRATORY (INHALATION) at 03:08

## 2020-12-08 RX ADMIN — Medication 10 ML: at 20:27

## 2020-12-08 RX ADMIN — IPRATROPIUM BROMIDE AND ALBUTEROL SULFATE 1 AMPULE: 2.5; .5 SOLUTION RESPIRATORY (INHALATION) at 20:11

## 2020-12-08 RX ADMIN — FENTANYL CITRATE 50 MCG: 50 INJECTION, SOLUTION INTRAMUSCULAR; INTRAVENOUS at 14:43

## 2020-12-08 RX ADMIN — MIDAZOLAM HYDROCHLORIDE 2 MG: 1 INJECTION, SOLUTION INTRAMUSCULAR; INTRAVENOUS at 09:16

## 2020-12-08 RX ADMIN — FOLIC ACID 1 MG: 1 TABLET ORAL at 08:04

## 2020-12-08 RX ADMIN — FENTANYL CITRATE 50 MCG: 50 INJECTION, SOLUTION INTRAMUSCULAR; INTRAVENOUS at 13:38

## 2020-12-08 RX ADMIN — INFLUENZA A VIRUS A/VICTORIA/2454/2019 IVR-207 (H1N1) ANTIGEN (PROPIOLACTONE INACTIVATED), INFLUENZA A VIRUS A/HONG KONG/2671/2019 IVR-208 (H3N2) ANTIGEN (PROPIOLACTONE INACTIVATED), INFLUENZA B VIRUS B/VICTORIA/705/2018 BVR-11 ANTIGEN (PROPIOLACTONE INACTIVATED), INFLUENZA B VIRUS B/PHUKET/3073/2013 BVR-1B ANTIGEN (PROPIOLACTONE INACTIVATED) 0.5 ML: 15; 15; 15; 15 INJECTION, SUSPENSION INTRAMUSCULAR at 18:07

## 2020-12-08 RX ADMIN — IPRATROPIUM BROMIDE AND ALBUTEROL SULFATE 1 AMPULE: 2.5; .5 SOLUTION RESPIRATORY (INHALATION) at 07:24

## 2020-12-08 RX ADMIN — DEXAMETHASONE SODIUM PHOSPHATE 6 MG: 10 INJECTION, SOLUTION INTRAMUSCULAR; INTRAVENOUS at 08:03

## 2020-12-08 RX ADMIN — FENTANYL CITRATE 50 MCG: 50 INJECTION, SOLUTION INTRAMUSCULAR; INTRAVENOUS at 16:19

## 2020-12-08 RX ADMIN — PANTOPRAZOLE SODIUM 40 MG: 40 INJECTION, POWDER, FOR SOLUTION INTRAVENOUS at 08:04

## 2020-12-08 RX ADMIN — IPRATROPIUM BROMIDE AND ALBUTEROL SULFATE 1 AMPULE: 2.5; .5 SOLUTION RESPIRATORY (INHALATION) at 23:26

## 2020-12-08 RX ADMIN — MAGNESIUM SULFATE HEPTAHYDRATE 1 G: 1 INJECTION, SOLUTION INTRAVENOUS at 13:38

## 2020-12-08 RX ADMIN — PANTOPRAZOLE SODIUM 40 MG: 40 INJECTION, POWDER, FOR SOLUTION INTRAVENOUS at 20:26

## 2020-12-08 RX ADMIN — MIDAZOLAM HYDROCHLORIDE 2 MG: 1 INJECTION, SOLUTION INTRAMUSCULAR; INTRAVENOUS at 05:29

## 2020-12-08 RX ADMIN — PROPOFOL 10 MCG/KG/MIN: 10 INJECTION, EMULSION INTRAVENOUS at 09:15

## 2020-12-08 RX ADMIN — POTASSIUM BICARBONATE 40 MEQ: 782 TABLET, EFFERVESCENT ORAL at 05:41

## 2020-12-08 RX ADMIN — Medication 10 ML: at 08:05

## 2020-12-08 RX ADMIN — FENTANYL CITRATE 50 MCG: 50 INJECTION, SOLUTION INTRAMUSCULAR; INTRAVENOUS at 09:16

## 2020-12-08 RX ADMIN — MIDAZOLAM HYDROCHLORIDE 2 MG: 1 INJECTION, SOLUTION INTRAMUSCULAR; INTRAVENOUS at 14:43

## 2020-12-08 RX ADMIN — Medication 15 ML: at 08:03

## 2020-12-08 RX ADMIN — IPRATROPIUM BROMIDE AND ALBUTEROL SULFATE 1 AMPULE: 2.5; .5 SOLUTION RESPIRATORY (INHALATION) at 14:37

## 2020-12-08 RX ADMIN — IPRATROPIUM BROMIDE AND ALBUTEROL SULFATE 1 AMPULE: 2.5; .5 SOLUTION RESPIRATORY (INHALATION) at 10:56

## 2020-12-08 RX ADMIN — FENTANYL CITRATE 50 MCG: 50 INJECTION, SOLUTION INTRAMUSCULAR; INTRAVENOUS at 08:04

## 2020-12-08 RX ADMIN — Medication 10 ML: at 20:26

## 2020-12-08 ASSESSMENT — PAIN SCALES - GENERAL
PAINLEVEL_OUTOF10: 0

## 2020-12-08 ASSESSMENT — PULMONARY FUNCTION TESTS
PIF_VALUE: 25
PIF_VALUE: 22
PIF_VALUE: 22
PIF_VALUE: 24
PIF_VALUE: 27

## 2020-12-08 NOTE — PROGRESS NOTES
Spontaneous breathing trial started  Ps 5/5 30%  tv 260, rr 25 rsbi 87  Increased FiO2 to 40% for SpO2 greater than 90  Will monitor

## 2020-12-08 NOTE — PROGRESS NOTES
Reassessment completed, see flow sheet. Propofol down to 10 mcg/kg/min. Precedex continues at 0.7 mcg/kg/hr. Pt squeezing hands when asked to and making eye contact, but still remaining calm and tolerating vent well. AM potassium 3.5, will give 40 mEq of effer-k per PRN orders. No other changes at this time, will continue to monitor.

## 2020-12-08 NOTE — PROGRESS NOTES
Report given to Prabha Turner RN at bedside for transfer of care. Pt denies needs at this time, call light within reach.   Darylene Friedlander Pride RN

## 2020-12-08 NOTE — PROGRESS NOTES
Comprehensive Nutrition Assessment    Type and Reason for Visit:  Reassess    Nutrition Recommendations/Plan:   1. Modified TF order to Vital High-Protein with a new goal rate of 30 ml/hr x 20 hours. Increase current rate by 10 ml every 4-6 hours, as tolerated by patient, until goal rate can be achieved and maintained. Water flushes, 60 ml every 6 hours or per MD guidance. 2. Monitor TF rate, intake, and tolerance + water flushes. 3. Monitor vent status, sedation type/amount, and continue to monitor TG every 72 hours while patient is receiving propofol for sedation check on 12/7/20 was 51 mg/dl. 4. Please obtain an updated weight for this patient - last weight was obtained on 12/6/20.   5. Monitor nutrition-related labs, bowel function, and weight trends.      Nutrition Assessment:  patient remains unchanged from a nutritional standpoint since last RD assessment; patient remains at risk for further compromise d/t ongoing intubation, need for EN as sole source of nutrition, and significant weight gain; will modify TF order to Vital High-Protein with a goal rate of 30 ml/hr x 20 hours + 60 ml water flushes every 6 hours or per MD guidance    Malnutrition Assessment:  Malnutrition Status:  Severe malnutrition    Context:  Acute Illness     Findings of the 6 clinical characteristics of malnutrition:  Energy Intake:  7 - 50% or less of estimated energy requirements for 5 or more days  Weight Loss:  (+ weight gain r/t fluid accumulation)     Body Fat Loss:  7 - Moderate body fat loss(observed through room window and on camera monitor d/t COVID-19 precautions) Orbital, Buccal region   Muscle Mass Loss:  7 - Moderate muscle mass loss(observed through room window and on camera monitor d/t COVID-19 precautions) Temples (temporalis)  Fluid Accumulation:  7 - Moderate to Severe Extremities(+ 2 pitting edema in bilateral hands)   Strength:  Not Performed    Estimated Daily Nutrient Needs:  Energy (kcal):  598 - 650 kcals based on 23-25 kcals/kg/CBW; Weight Used for Energy Requirements:  Current     Protein (g):  44 - 47 g protein based on 1.7-1.8 g/kg/CBW; Weight Used for Protein Requirements:  Current        Fluid (ml/day):  598 - 650 ml; Method Used for Fluid Requirements:  1 ml/kcal      Nutrition Related Findings:  patient remains intubated after she failed SBT this am; propofol was re-started at 10 mcg x 24 hours; patient responds to voice; abdomen is flat, soft, and bowel sounds are active; last BM was on 12/5/20; patient is receiving decadron, folic acid, protonix, and precedex; + 2 pitting in bilateral hands noted;  Ca and Mag are low today      Wounds:  None       Current Nutrition Therapies:    Current Tube Feeding (TF) Orders:  · Feeding Route: Orogastric  · Formula: Low Calorie, High Protein  · Schedule: Continuous  · Additives/Modulars: (none)  · Water Flushes: 60 ml water flushes every 6 hours  · Current TF & Flush Orders Provides: TF is at goal and patient is tolerating well  · Goal TF & Flush Orders Provides: Vital High-Protein at 30 ml/hr x 20 hours = 600 ml TV, 600 kcals, 53 g protein, and 502 ml free water + 60 ml water flushes every 6 hours or per MD guidance      Anthropometric Measures:  · Height: 5' 2\" (157.5 cm)  · Current Body Weight: 81 lb (36.7 kg)(obtained on 12/6/20; patient is + 12.4L fluid since admission)   · Admission Body Weight: 58 lb 10.3 oz (26.6 kg)(obtained on 12/1/20)    · Usual Body Weight: 58 lb 10.3 oz (26.6 kg)(obtained on 12/1/20)     · Ideal Body Weight: 110 lbs; % Ideal Body Weight 73.6 %   · BMI: 14.8   · BMI Categories: Underweight (BMI less than 22) age over 72       Nutrition Diagnosis:   · Severe malnutrition related to inadequate protein-energy intake, impaired respiratory function, cognitive or neurological impairment as evidenced by NPO or clear liquid status due to medical condition, intubation, poor intake prior to admission, severe loss of subcutaneous fat, severe muscle loss, lab values      Nutrition Interventions:   Food and/or Nutrient Delivery:  Continue NPO, Modify Tube Feeding  Nutrition Education/Counseling:  No recommendation at this time   Coordination of Nutrition Care:  Continue to monitor while inpatient, Interdisciplinary Rounds    Goals:  patient will tolerate Vital High-Protein at new goal rate of 30 ml/hr x 20 hours without GI distress, without s/s of aspiration, and without additional lab/fluid disturbances       Nutrition Monitoring and Evaluation:   Behavioral-Environmental Outcomes:  None Identified   Food/Nutrient Intake Outcomes:  Enteral Nutrition Intake/Tolerance  Physical Signs/Symptoms Outcomes:  Biochemical Data, Fluid Status or Edema, Hemodynamic Status, Nutrition Focused Physical Findings, Skin, Weight     Discharge Planning:     Too soon to determine     Electronically signed by Audelia Moses RD, LD on 12/8/20 at 10:14 AM EST    Contact: [phone]

## 2020-12-08 NOTE — PROGRESS NOTES
Pulmonary & Critical Care Medicine ICU Progress Note      CC: COVID-19, hypoxemia, AMS    Events of Last 24 hours:    Did not tolerate SBT 2/2 low lung volumes, agitation  Transient hypotension with Precedex, required fluid bolus x1  's during trial after about one hour  On precedex and propofol    Invasive Lines:   12/4/2020 PICC    MV: 12/1/2020  Vent Mode: AC/VC Rate Set: 18 bmp/Vt Ordered: 350 mL/ /FiO2 : 30 %  Recent Labs     12/07/20 0443 12/08/20 0455   PHART 7.468* 7.462*   PAL9AZZ 39.9 46.6*   PO2ART 78.5 71.1*       IV:   dexmedetomidine (PRECEDEX) IV infusion 0.7 mcg/kg/hr (12/08/20 0251)    propofol 10 mcg/kg/min (12/08/20 0450)    dextrose         EXAM:  BP (!) 91/52   Pulse 80   Temp 98 °F (36.7 °C) (Axillary)   Resp 18   Ht 5' 2\" (1.575 m)   Wt 81 lb (36.7 kg)   SpO2 93%   BMI 14.82 kg/m²  on vent      Intake/Output Summary (Last 24 hours) at 12/8/2020 9418  Last data filed at 12/8/2020 0600  Gross per 24 hour   Intake 2195 ml   Output 2110 ml   Net 85 ml     General: intubated, ill appearing    Eyes: PERRL. No sclera icterus. No conjunctival injection. ENT: No discharge. Pharynx with ETT. Neck: Trachea midline. Normal thyroid. Resp: No accessory muscle use. No crackles. No wheezing. No rhonchi. No dullness on percussion. CV: Regular rate. Regular rhythm. No mumur or rub. Minimal UE edema. GI: Non-tender. Non-distended. No masses. No organomegaly. Normal bowel sounds. No hernia. Skin: Warm and dry. No nodule on exposed extremities. No rash on exposed extremities. Lymph: No cervical LAD. No supraclavicular LAD. M/S: No cyanosis. No joint deformity. No clubbing. Neuro: FC for RN, NANCY. Awakens to voice.   Patellar reflexes are symmetric  Psych: Unable to obtain because the patient is non-communicative     Medications:   enoxaparin  30 mg Subcutaneous Daily    sodium chloride flush  10 mL Intravenous 2 times per day    ipratropium-albuterol  1 ampule Inhalation Q4H    arrest early am 12/4/20 with unclear etiology - 30 seconds before ROSC     PLAN:  · Mechanical ventilation per my orders. The ventilator was adjusted by me at the bedside for unstable, life threatening respiratory failure. Wean oxygen as tolerated. · Low tidal volume ventilation    · Propofol drip for sedation target RASS -2; prn versed and fentanyl; Precedex to assist with weaning  · Daily SAT/SBT  · Conservative fluid strategy - has been even last 24 hours, overall markedly positive, but clinically appears dry to euvolemic   · D#8 Decadron 6 mg daily  · Completed 5 days Remdesevir; s/p 1 U CCP 12/3/2020  · Completed 7 days ceftriaxone and 5 days azithromycin  · S/P 1 U PRBC  · FC  · ICU care -  Resume lovenox but just once daily; on BID protonix    Total critical care time caring for this patient with life threatening, unstable organ failure, including direct patient contact, management of life support systems, review of data including imaging and labs, discussions with other team members and physicians is 32 minutes so far today, excluding procedures.

## 2020-12-08 NOTE — PROGRESS NOTES
SBT stopped at this time d/t HR and RR. Pt placed back on 10mcgs of propofol and PRNs administered. Will contiue to monitor.   Shala Martinez RN

## 2020-12-08 NOTE — PROGRESS NOTES
12/08/20 0308   Vent Information   Vent Type 840   Vent Mode AC/VC   Vt Ordered 350 mL   Rate Set 18 bmp   Peak Flow 50 L/min   FiO2  30 %   SpO2 96 %   SpO2/FiO2 ratio 320   Sensitivity 3   PEEP/CPAP 5   Humidification Source HME   Vent Patient Data   Peak Inspiratory Pressure 24 cmH2O   Mean Airway Pressure 9.6 cmH20   Rate Measured 18 br/min   Vt Exhaled 316 mL   Minute Volume 5.6 Liters   I:E Ratio 1:3.4   Plateau Pressure 13 [id number]   Cough/Sputum   Sputum How Obtained Endotracheal;Suctioned   Cough Productive   Sputum Amount Small   Sputum Color Creamy   Tenacity Thick   Breath Sounds   Right Upper Lobe Diminished   Right Middle Lobe Diminished   Right Lower Lobe Diminished   Left Upper Lobe Diminished   Left Lower Lobe Diminished   Additional Respiratory  Assessments   Resp 21   Position Semi-Solano's   Alarm Settings   High Pressure Alarm 45 cmH2O   Low Minute Volume Alarm 5 L/min   Apnea (secs) 20 secs   High Respiratory Rate 45 br/min   Low Exhaled Vt  200 mL   Non-Surgical Airway Endo Tracheal Tube   Placement Date/Time: 12/01/20 1442   Timeout: Patient  Placed By: In ED  Inserted by: Nicole Bigger  Insertion attempts: 1  Airway Device: Endo Tracheal Tube  Size: 7  Placement Verified By[de-identified] Auscultation   Secured at 22 cm   Measured From Lips   Secured Location Left   Secured By Commercial tube gale   Site Condition Dry

## 2020-12-08 NOTE — PROGRESS NOTES
Tube feed held and propofol stopped at this time for SBT. Pt awake and following commands. Repositioned, will continue to monitor.     Silver Martinez RN

## 2020-12-08 NOTE — PROGRESS NOTES
Updated Dr. Dany Iqbal regarding pt's hypotension. New order for 500ml bolus. Will continue to monitor.   Connie Martinez RN

## 2020-12-08 NOTE — PROGRESS NOTES
Reassessment completed, see flow sheet. Pt hypotensive (80s/40s) after sedation adjusted.  mL bolus infusing now. Propofol at 25 mcg/kg/min  Precedex at 0.7 mcg/kg/hr    Pt bathed at this time. Will continue to monitor.

## 2020-12-08 NOTE — PROGRESS NOTES
IM Progress Note    Admit Date:  12/1/2020  7    Interval history:  Admitted with covid 19 infection , resp failure  Emergently intubated in ER   Given 1 unit PRBC for anemia     81/1- brief asystolic for 30 sec during nursing care, had CPR for few sec and no meds, ROSC      12/5- no acute events , failed SBT    12/6- pt remains on vent, tolerated SBT few min    12/7   Could not tolerate SBT  Started on precedex       12/8   On SBT   on precedex     Subjective:-    Ms. Jose Topete not seen at bedside today    sedated in bed, no distress      Objective:   BP (!) 96/56   Pulse 95   Temp 98 °F (36.7 °C) (Bladder)   Resp 18   Ht 5' 2\" (1.575 m)   Wt 81 lb (36.7 kg)   SpO2 93%   BMI 14.82 kg/m²       Intake/Output Summary (Last 24 hours) at 12/8/2020 9314  Last data filed at 12/8/2020 0600  Gross per 24 hour   Intake 2135 ml   Output 2110 ml   Net 25 ml       Physical Exam:    General: thin cachetic appearing elderly female on vent  Awake,alert   Appears to be not in any distress  Mucous Membranes:  Pink , anicteric  Neck: No JVD, no carotid bruit, no thyromegaly  Chest:  Clear to auscultation bilaterally  Cardiovascular:  RRR S1S2 heard, no murmurs or gallops  Abdomen:  Soft, undistended, non tender, no organomegaly, BS present  Extremities:  Severe muscle wasting of all ext  No edema or cyanosis.  Distal pulses well felt  Neurological : awake,on precedex     Medications:   Scheduled Medications:    enoxaparin  30 mg Subcutaneous Daily    sodium chloride flush  10 mL Intravenous 2 times per day    ipratropium-albuterol  1 ampule Inhalation Q4H    sodium chloride  20 mL Intravenous Once    dexamethasone  6 mg Intravenous Daily    pantoprazole  40 mg Intravenous BID    folic acid  1 mg Oral Daily    sodium chloride flush  10 mL Intravenous 2 times per day    chlorhexidine  15 mL Mouth/Throat BID    influenza virus vaccine  0.5 mL Intramuscular Prior to discharge     I   dexmedetomidine (PRECEDEX) IV infusion 0.7 mcg/kg/hr (12/08/20 0251)    propofol Stopped (12/08/20 0829)    dextrose       sodium chloride flush, sodium chloride flush, acetaminophen **OR** acetaminophen, polyethylene glycol, promethazine **OR** ondansetron, glucose, dextrose, glucagon (rDNA), dextrose, potassium chloride **OR** potassium alternative oral replacement **OR** potassium chloride, magnesium sulfate, midazolam, fentanNYL    Lab Data:  Recent Labs     12/06/20  0330 12/07/20 0443 12/08/20  0435   WBC 4.1 5.5 4.6   HGB 7.1* 7.8* 7.1*   HCT 22.6* 25.4* 22.4*   MCV 67.6* 68.3* 68.2*    196 199     Recent Labs     12/06/20 0330 12/07/20 0443 12/08/20  0435    143 137   K 4.0 3.7 3.5    107 102   CO2 28 28 32   BUN 18 13 17   CREATININE <0.5* <0.5* <0.5*     No results for input(s): CKTOTAL, CKMB, CKMBINDEX, TROPONINI in the last 72 hours. Coagulation:   Lab Results   Component Value Date    INR 0.88 03/04/2018    APTT 36.9 03/04/2018     Cardiac markers:   Lab Results   Component Value Date    CKTOTAL 121 06/01/2017    TROPONINI <0.01 12/04/2020         Lab Results   Component Value Date    ALT 9 (L) 12/08/2020    AST 16 12/08/2020    ALKPHOS 35 (L) 12/08/2020    BILITOT <0.2 12/08/2020       Lab Results   Component Value Date    INR 0.88 03/04/2018    INR 0.86 06/01/2017    INR 0.90 10/07/2013    PROTIME 10.0 03/04/2018    PROTIME 10.0 06/01/2017    PROTIME 10.1 10/07/2013       Radiology    EKG:  I have reviewed the EKG with the following interpretation:   Sinus tachycardia with ventricular rate 111 bpm     RADIOLOGY  XR CHEST PORTABLE   Final Result   Technically limited study performed portably with arms overlying the lower   chest.  Asymmetric ground-glass opacification appears present in the right   lower lung zone which may represent edema or developing pneumonitis.          cxr    Stable appropriate positions of support apparatus.     2. Stable multifocal pneumonia.             Ct head    No acute intracranial

## 2020-12-08 NOTE — PLAN OF CARE
Nutrition Problem #1: Severe malnutrition  Intervention: Food and/or Nutrient Delivery: Continue NPO, Modify Tube Feeding  Nutritional Goals: patient will tolerate Vital High-Protein at new goal rate of 30 ml/hr x 20 hours without GI distress, without s/s of aspiration, and without additional lab/fluid disturbances

## 2020-12-08 NOTE — PROGRESS NOTES
12/07/20 1910   Vent Information   $Ventilation $Subsequent Day   Vent Type 840   Vent Mode AC/VC   Vt Ordered 350 mL   Rate Set 18 bmp   Peak Flow 50 L/min   FiO2  30 %   SpO2 97 %   SpO2/FiO2 ratio 323.33   Sensitivity 3   PEEP/CPAP 5   Humidification Source HME   Vent Patient Data   Peak Inspiratory Pressure 22 cmH2O   Mean Airway Pressure 9.5 cmH20   Rate Measured 19 br/min   Vt Exhaled 311 mL   Minute Volume 6.6 Liters   I:E Ratio 1:3.0   Plateau Pressure 13 [id number]   Static Compliance 39 mL/cmH2O   Dynamic Compliance 18 mL/cmH2O   Cough/Sputum   Sputum How Obtained Endotracheal;Suctioned   Cough Productive   Sputum Amount Small   Sputum Color Creamy   Tenacity Thick   Breath Sounds   Right Upper Lobe Diminished   Right Middle Lobe Diminished   Right Lower Lobe Diminished   Left Upper Lobe Diminished   Left Lower Lobe Diminished   Additional Respiratory  Assessments   Resp 18   Position Semi-Solano's   Alarm Settings   High Pressure Alarm 45 cmH2O   Low Minute Volume Alarm 5 L/min   Apnea (secs) 20 secs   High Respiratory Rate 45 br/min   Low Exhaled Vt  200 mL   Non-Surgical Airway Endo Tracheal Tube   Placement Date/Time: 12/01/20 1442   Timeout: Patient  Placed By: In ED  Inserted by: Arminda Alvarez  Insertion attempts: 1  Airway Device: Endo Tracheal Tube  Size: 7  Placement Verified By[de-identified] Auscultation   Secured at 22 cm   Measured From Lips   Secured Location Left  (moved to left)   Secured By Commercial tube gale   Site Condition Dry

## 2020-12-09 ENCOUNTER — APPOINTMENT (OUTPATIENT)
Dept: GENERAL RADIOLOGY | Age: 70
DRG: 870 | End: 2020-12-09
Payer: MEDICARE

## 2020-12-09 LAB
A/G RATIO: 0.8 (ref 1.1–2.2)
ALBUMIN SERPL-MCNC: 2.2 G/DL (ref 3.4–5)
ALP BLD-CCNC: 37 U/L (ref 40–129)
ALT SERPL-CCNC: 9 U/L (ref 10–40)
ANION GAP SERPL CALCULATED.3IONS-SCNC: 6 MMOL/L (ref 3–16)
AST SERPL-CCNC: 16 U/L (ref 15–37)
BASE EXCESS ARTERIAL: 5.3 MMOL/L (ref -3–3)
BASOPHILS ABSOLUTE: 0 K/UL (ref 0–0.2)
BASOPHILS RELATIVE PERCENT: 0.4 %
BILIRUB SERPL-MCNC: <0.2 MG/DL (ref 0–1)
BUN BLDV-MCNC: 25 MG/DL (ref 7–20)
CALCIUM SERPL-MCNC: 8 MG/DL (ref 8.3–10.6)
CARBOXYHEMOGLOBIN ARTERIAL: 0.8 % (ref 0–1.5)
CHLORIDE BLD-SCNC: 103 MMOL/L (ref 99–110)
CO2: 31 MMOL/L (ref 21–32)
CREAT SERPL-MCNC: <0.5 MG/DL (ref 0.6–1.2)
EOSINOPHILS ABSOLUTE: 0.1 K/UL (ref 0–0.6)
EOSINOPHILS RELATIVE PERCENT: 1.2 %
GFR AFRICAN AMERICAN: >60
GFR NON-AFRICAN AMERICAN: >60
GLOBULIN: 2.9 G/DL
GLUCOSE BLD-MCNC: 109 MG/DL (ref 70–99)
GLUCOSE BLD-MCNC: 126 MG/DL (ref 70–99)
GLUCOSE BLD-MCNC: 138 MG/DL (ref 70–99)
GLUCOSE BLD-MCNC: 149 MG/DL (ref 70–99)
GLUCOSE BLD-MCNC: 158 MG/DL (ref 70–99)
HCO3 ARTERIAL: 29.9 MMOL/L (ref 21–29)
HCT VFR BLD CALC: 23.1 % (ref 36–48)
HEMOGLOBIN, ART, EXTENDED: 7.9 G/DL (ref 12–16)
HEMOGLOBIN: 7.1 G/DL (ref 12–16)
LYMPHOCYTES ABSOLUTE: 0.7 K/UL (ref 1–5.1)
LYMPHOCYTES RELATIVE PERCENT: 13.5 %
MCH RBC QN AUTO: 21.4 PG (ref 26–34)
MCHC RBC AUTO-ENTMCNC: 30.9 G/DL (ref 31–36)
MCV RBC AUTO: 69.5 FL (ref 80–100)
METHEMOGLOBIN ARTERIAL: 0.3 %
MONOCYTES ABSOLUTE: 0.4 K/UL (ref 0–1.3)
MONOCYTES RELATIVE PERCENT: 8.6 %
NEUTROPHILS ABSOLUTE: 3.8 K/UL (ref 1.7–7.7)
NEUTROPHILS RELATIVE PERCENT: 76.3 %
O2 CONTENT ARTERIAL: 11 ML/DL
O2 SAT, ARTERIAL: 95.6 %
O2 THERAPY: ABNORMAL
PCO2 ARTERIAL: 44.2 MMHG (ref 35–45)
PDW BLD-RTO: 26.7 % (ref 12.4–15.4)
PERFORMED ON: ABNORMAL
PH ARTERIAL: 7.45 (ref 7.35–7.45)
PLATELET # BLD: 214 K/UL (ref 135–450)
PMV BLD AUTO: 8.3 FL (ref 5–10.5)
PO2 ARTERIAL: 75.6 MMHG (ref 75–108)
POTASSIUM REFLEX MAGNESIUM: 3.9 MMOL/L (ref 3.5–5.1)
RBC # BLD: 3.32 M/UL (ref 4–5.2)
SODIUM BLD-SCNC: 140 MMOL/L (ref 136–145)
TCO2 ARTERIAL: 31.2 MMOL/L
TOTAL PROTEIN: 5.1 G/DL (ref 6.4–8.2)
WBC # BLD: 5 K/UL (ref 4–11)

## 2020-12-09 PROCEDURE — 6370000000 HC RX 637 (ALT 250 FOR IP): Performed by: INTERNAL MEDICINE

## 2020-12-09 PROCEDURE — 85025 COMPLETE CBC W/AUTO DIFF WBC: CPT

## 2020-12-09 PROCEDURE — 94640 AIRWAY INHALATION TREATMENT: CPT

## 2020-12-09 PROCEDURE — 2580000003 HC RX 258: Performed by: PHYSICIAN ASSISTANT

## 2020-12-09 PROCEDURE — 2000000000 HC ICU R&B

## 2020-12-09 PROCEDURE — 2580000003 HC RX 258: Performed by: INTERNAL MEDICINE

## 2020-12-09 PROCEDURE — 6360000002 HC RX W HCPCS: Performed by: INTERNAL MEDICINE

## 2020-12-09 PROCEDURE — 99291 CRITICAL CARE FIRST HOUR: CPT | Performed by: INTERNAL MEDICINE

## 2020-12-09 PROCEDURE — 80053 COMPREHEN METABOLIC PANEL: CPT

## 2020-12-09 PROCEDURE — 2500000003 HC RX 250 WO HCPCS: Performed by: INTERNAL MEDICINE

## 2020-12-09 PROCEDURE — 94003 VENT MGMT INPAT SUBQ DAY: CPT

## 2020-12-09 PROCEDURE — 36600 WITHDRAWAL OF ARTERIAL BLOOD: CPT

## 2020-12-09 PROCEDURE — 71045 X-RAY EXAM CHEST 1 VIEW: CPT

## 2020-12-09 PROCEDURE — 94761 N-INVAS EAR/PLS OXIMETRY MLT: CPT

## 2020-12-09 PROCEDURE — 99232 SBSQ HOSP IP/OBS MODERATE 35: CPT | Performed by: INTERNAL MEDICINE

## 2020-12-09 PROCEDURE — 2700000000 HC OXYGEN THERAPY PER DAY

## 2020-12-09 PROCEDURE — 36592 COLLECT BLOOD FROM PICC: CPT

## 2020-12-09 PROCEDURE — 94750 HC PULMONARY COMPLIANCE STUDY: CPT

## 2020-12-09 PROCEDURE — C9113 INJ PANTOPRAZOLE SODIUM, VIA: HCPCS | Performed by: INTERNAL MEDICINE

## 2020-12-09 PROCEDURE — 82803 BLOOD GASES ANY COMBINATION: CPT

## 2020-12-09 RX ORDER — 0.9 % SODIUM CHLORIDE 0.9 %
500 INTRAVENOUS SOLUTION INTRAVENOUS PRN
Status: DISCONTINUED | OUTPATIENT
Start: 2020-12-09 | End: 2020-12-23 | Stop reason: HOSPADM

## 2020-12-09 RX ADMIN — SODIUM CHLORIDE 0.8 MCG/KG/HR: 9 INJECTION, SOLUTION INTRAVENOUS at 10:23

## 2020-12-09 RX ADMIN — IPRATROPIUM BROMIDE AND ALBUTEROL SULFATE 1 AMPULE: 2.5; .5 SOLUTION RESPIRATORY (INHALATION) at 07:50

## 2020-12-09 RX ADMIN — DEXAMETHASONE SODIUM PHOSPHATE 6 MG: 10 INJECTION, SOLUTION INTRAMUSCULAR; INTRAVENOUS at 07:42

## 2020-12-09 RX ADMIN — MIDAZOLAM HYDROCHLORIDE 2 MG: 1 INJECTION, SOLUTION INTRAMUSCULAR; INTRAVENOUS at 02:06

## 2020-12-09 RX ADMIN — FENTANYL CITRATE 50 MCG: 50 INJECTION, SOLUTION INTRAMUSCULAR; INTRAVENOUS at 16:06

## 2020-12-09 RX ADMIN — MIDAZOLAM HYDROCHLORIDE 2 MG: 1 INJECTION, SOLUTION INTRAMUSCULAR; INTRAVENOUS at 10:22

## 2020-12-09 RX ADMIN — FENTANYL CITRATE 50 MCG: 50 INJECTION, SOLUTION INTRAMUSCULAR; INTRAVENOUS at 14:10

## 2020-12-09 RX ADMIN — FOLIC ACID 1 MG: 1 TABLET ORAL at 07:42

## 2020-12-09 RX ADMIN — Medication 15 ML: at 07:42

## 2020-12-09 RX ADMIN — Medication 10 ML: at 19:45

## 2020-12-09 RX ADMIN — Medication 10 ML: at 07:41

## 2020-12-09 RX ADMIN — FENTANYL CITRATE 50 MCG: 50 INJECTION, SOLUTION INTRAMUSCULAR; INTRAVENOUS at 07:40

## 2020-12-09 RX ADMIN — ENOXAPARIN SODIUM 30 MG: 100 INJECTION SUBCUTANEOUS at 07:42

## 2020-12-09 RX ADMIN — MIDAZOLAM HYDROCHLORIDE 2 MG: 1 INJECTION, SOLUTION INTRAMUSCULAR; INTRAVENOUS at 04:29

## 2020-12-09 RX ADMIN — IPRATROPIUM BROMIDE AND ALBUTEROL SULFATE 1 AMPULE: 2.5; .5 SOLUTION RESPIRATORY (INHALATION) at 03:24

## 2020-12-09 RX ADMIN — IPRATROPIUM BROMIDE AND ALBUTEROL SULFATE 1 AMPULE: 2.5; .5 SOLUTION RESPIRATORY (INHALATION) at 15:26

## 2020-12-09 RX ADMIN — SODIUM CHLORIDE 500 ML: 9 INJECTION, SOLUTION INTRAVENOUS at 14:10

## 2020-12-09 RX ADMIN — MIDAZOLAM HYDROCHLORIDE 2 MG: 1 INJECTION, SOLUTION INTRAMUSCULAR; INTRAVENOUS at 07:39

## 2020-12-09 RX ADMIN — Medication 10 ML: at 07:42

## 2020-12-09 RX ADMIN — FENTANYL CITRATE 50 MCG: 50 INJECTION, SOLUTION INTRAMUSCULAR; INTRAVENOUS at 10:22

## 2020-12-09 RX ADMIN — IPRATROPIUM BROMIDE AND ALBUTEROL SULFATE 1 AMPULE: 2.5; .5 SOLUTION RESPIRATORY (INHALATION) at 10:56

## 2020-12-09 RX ADMIN — PROPOFOL 20 MCG/KG/MIN: 10 INJECTION, EMULSION INTRAVENOUS at 14:10

## 2020-12-09 RX ADMIN — PANTOPRAZOLE SODIUM 40 MG: 40 INJECTION, POWDER, FOR SOLUTION INTRAVENOUS at 19:44

## 2020-12-09 RX ADMIN — IPRATROPIUM BROMIDE AND ALBUTEROL SULFATE 1 AMPULE: 2.5; .5 SOLUTION RESPIRATORY (INHALATION) at 19:00

## 2020-12-09 RX ADMIN — MIDAZOLAM HYDROCHLORIDE 2 MG: 1 INJECTION, SOLUTION INTRAMUSCULAR; INTRAVENOUS at 16:20

## 2020-12-09 RX ADMIN — Medication 15 ML: at 19:44

## 2020-12-09 RX ADMIN — IPRATROPIUM BROMIDE AND ALBUTEROL SULFATE 1 AMPULE: 2.5; .5 SOLUTION RESPIRATORY (INHALATION) at 23:42

## 2020-12-09 RX ADMIN — PANTOPRAZOLE SODIUM 40 MG: 40 INJECTION, POWDER, FOR SOLUTION INTRAVENOUS at 07:41

## 2020-12-09 ASSESSMENT — PULMONARY FUNCTION TESTS
PIF_VALUE: 35
PIF_VALUE: 24
PIF_VALUE: 28
PIF_VALUE: 29
PIF_VALUE: 26

## 2020-12-09 ASSESSMENT — PAIN SCALES - GENERAL
PAINLEVEL_OUTOF10: 0

## 2020-12-09 NOTE — PROGRESS NOTES
12/08/20 2323   Vent Information   Vent Type 840   Vent Mode AC/VC   Vt Ordered 350 mL   Rate Set 18 bmp   Peak Flow 50 L/min   FiO2  30 %   SpO2 96 %   SpO2/FiO2 ratio 320   Sensitivity 3   PEEP/CPAP 5   Humidification Source HME   Vent Patient Data   Peak Inspiratory Pressure 22 cmH2O   Mean Airway Pressure 9.1 cmH20   Rate Measured 18 br/min   Vt Exhaled 315 mL   Minute Volume 5.64 Liters   Cough/Sputum   Sputum How Obtained Endotracheal;Suctioned   Cough Productive   Sputum Amount Small   Sputum Color Cloudy;Creamy   Tenacity Thick   Spontaneous Breathing Trial (SBT) RT Doc   Pulse 89   Breath Sounds   Right Upper Lobe Diminished   Right Middle Lobe Diminished   Right Lower Lobe Diminished   Left Upper Lobe Diminished   Left Lower Lobe Diminished   Additional Respiratory  Assessments   Resp 18   Position Semi-Solano's   Alarm Settings   High Pressure Alarm 45 cmH2O   Low Minute Volume Alarm 5 L/min   High Respiratory Rate 45 br/min   Low Exhaled Vt  200 mL   Non-Surgical Airway Endo Tracheal Tube   Placement Date/Time: 12/01/20 1442   Timeout: Patient  Placed By: In ED  Inserted by: Trung Noble  Insertion attempts: 1  Airway Device: Endo Tracheal Tube  Size: 7  Placement Verified By[de-identified] Auscultation   Secured at 24 cm   Measured From [facility] By Commercial tube gale   Site Condition Dry

## 2020-12-09 NOTE — PROGRESS NOTES
PRN versed administered for agitation. HR 110s RR 30s. Pt squirming around in bed and unable to be redirected. Will continue to monitor.

## 2020-12-09 NOTE — PROGRESS NOTES
Reassessment complete, see flow sheet. ABG drawn from right radial artery x 2 attempt. Modified Nabil's test positive. Pressure held to site after procedure for five minutes. No hematoma present. Pulse present after procedure. Pt tolerated well. AM labs drawn per PICC, no complications. PRN versed given for agitation caused by lab and ABG draw. BPs maintaining 90s-100s/50s-60s even with sedation and PRNs. Continues to oxygenate well on vent. No other changes, will continue to monitor.

## 2020-12-09 NOTE — PROGRESS NOTES
12/08/20 2014   Vent Information   $Ventilation $Subsequent Day   Skin Assessment Clean, dry, & intact   Equipment Changed HME   Vent Type 840   Vent Mode AC/VC   Vt Ordered 3850 mL   Rate Set 18 bmp   Peak Flow 50 L/min   FiO2  30 %   SpO2 97 %   SpO2/FiO2 ratio 323.33   Sensitivity 3   PEEP/CPAP 5   Humidification Source HME   Vent Patient Data   Peak Inspiratory Pressure 22 cmH2O   Mean Airway Pressure 9.1 cmH20   Rate Measured 20 br/min   Vt Exhaled 391 mL   Minute Volume 6.95 Liters   I:E Ratio 1:3   Plateau Pressure 15 [id number]   Cough/Sputum   Sputum How Obtained Endotracheal;Suctioned   Cough Productive   Sputum Amount Small   Sputum Color Creamy   Tenacity Thick   Spontaneous Breathing Trial (SBT) RT Doc   Pulse 89   Breath Sounds   Right Upper Lobe Diminished   Right Middle Lobe Diminished   Right Lower Lobe Diminished   Left Upper Lobe Diminished   Left Lower Lobe Diminished   Additional Respiratory  Assessments   Resp 24   Position Semi-Solano's   Oral Care Mouth suctioned   Alarm Settings   High Pressure Alarm 45 cmH2O   Low Minute Volume Alarm 5 L/min   Apnea (secs) 20 secs   High Respiratory Rate 45 br/min   Low Exhaled Vt  200 mL   Non-Surgical Airway Endo Tracheal Tube   Placement Date/Time: 12/01/20 1442   Timeout: Patient  Placed By: In ED  Inserted by: Yonis Duff  Insertion attempts: 1  Airway Device: Endo Tracheal Tube  Size: 7  Placement Verified By[de-identified] Auscultation   Secured at 24 cm   Measured From Lips   Secured Location Left   Secured By Commercial tube gale   Site Condition Dry

## 2020-12-09 NOTE — PROGRESS NOTES
Reassessment completed, see flow sheet. Pt resting quietly in bed, tolerating ventilator well. No changes at this time. Will continue to monitor.

## 2020-12-09 NOTE — CARE COORDINATION
INTERDISCIPLINARY PLAN OF CARE CONFERENCE    Date/Time: 12/9/2020 11:58 AM  Completed by: Arlene Babinski MSW, LSW. Case Management      Patient Name:  Devin Borrero  YOB: 1950  Admitting Diagnosis: Failure to thrive in adult [R62.7]  Acute respiratory failure with hypoxia ([facility] [address] [J96.01]     Admit Date/Time:  12/1/2020  5:50 AM    Chart reviewed. Interdisciplinary team contacted or reviewed plan related to patient progress and discharge plans. Disciplines included Case Management, Nursing, and Dietitian. Current Status: ICU. Vent      Discharge Plan Comments: Chart review completed. Pt remains on a Vent. Per note from Dr. Dedra Petty from today 12/09/2020, pt failed SBT today. Home O2 in place on admit: No    CM will continue to follow and assist with discharge plans/referrals pending medical progress. Please notify CM if needs or concerns arise.

## 2020-12-09 NOTE — PLAN OF CARE
Problem: Airway Clearance - Ineffective  Goal: Achieve or maintain patent airway  Outcome: Ongoing     Problem: Gas Exchange - Impaired  Goal: Absence of hypoxia  Outcome: Ongoing  Goal: Promote optimal lung function  Outcome: Ongoing     Problem: Breathing Pattern - Ineffective  Goal: Ability to achieve and maintain a regular respiratory rate  Outcome: Ongoing     Problem:  Body Temperature -  Risk of, Imbalanced  Goal: Ability to maintain a body temperature within defined limits  Outcome: Ongoing  Goal: Will regain or maintain usual level of consciousness  Outcome: Ongoing  Goal: Complications related to the disease process, condition or treatment will be avoided or minimized  Outcome: Ongoing     Problem: Isolation Precautions - Risk of Spread of Infection  Goal: Prevent transmission of infection  Outcome: Ongoing     Problem: Nutrition Deficits  Goal: Optimize nutrtional status  Outcome: Ongoing     Problem: Risk for Fluid Volume Deficit  Goal: Maintain normal heart rhythm  Outcome: Ongoing  Goal: Maintain absence of muscle cramping  Outcome: Ongoing  Goal: Maintain normal serum potassium, sodium, calcium, phosphorus, and pH  Outcome: Ongoing     Problem: Loneliness or Risk for Loneliness  Goal: Demonstrate positive use of time alone when socialization is not possible  Outcome: Ongoing     Problem: Fatigue  Goal: Verbalize increase energy and improved vitality  Outcome: Ongoing     Problem: Patient Education: Go to Patient Education Activity  Goal: Patient/Family Education  Outcome: Ongoing     Problem: Restraint Use - Nonviolent/Non-Self-Destructive Behavior:  Goal: Absence of restraint indications  Description: Absence of restraint indications  Outcome: Ongoing  Goal: Absence of restraint-related injury  Description: Absence of restraint-related injury  Outcome: Ongoing     Problem: Falls - Risk of:  Goal: Will remain free from falls  Description: Will remain free from falls  Outcome: Ongoing  Goal: Absence of physical injury  Description: Absence of physical injury  Outcome: Ongoing     Problem: Skin Integrity:  Goal: Will show no infection signs and symptoms  Description: Will show no infection signs and symptoms  Outcome: Ongoing  Goal: Absence of new skin breakdown  Description: Absence of new skin breakdown  Outcome: Ongoing     Problem: Nutrition  Goal: Optimal nutrition therapy  12/8/2020 1917 by Robert Forte RN  Outcome: Ongoing  12/8/2020 1014 by Sang Saenz RD, LD  Outcome: Ongoing  Goal: Understanding of nutritional guidelines  12/8/2020 1917 by Robert Forte RN  Outcome: Ongoing  12/8/2020 1014 by Sang Saenz RD, LD  Outcome: Ongoing     Problem: Infection - Central Venous Catheter-Associated Bloodstream Infection:  Goal: Will show no infection signs and symptoms  Description: Will show no infection signs and symptoms  Outcome: Ongoing

## 2020-12-09 NOTE — PROGRESS NOTES
12/09/20 0325   Vent Information   Vent Type 840   Vent Mode AC/VC   Vt Ordered 350 mL   Rate Set 18 bmp   Peak Flow 50 L/min   FiO2  30 %   SpO2 100 %   SpO2/FiO2 ratio 333.33   Sensitivity 3   PEEP/CPAP 5   Humidification Source HME   Vent Patient Data   Peak Inspiratory Pressure 26 cmH2O   Mean Airway Pressure 9 cmH20   Rate Measured 18 br/min   Vt Exhaled 322 mL   Minute Volume 5.79 Liters   I:E Ratio 1:3.4   Cough/Sputum   Sputum How Obtained Suctioned;Endotracheal   Sputum Amount None   Spontaneous Breathing Trial (SBT) RT Doc   Pulse 78   Breath Sounds   Right Upper Lobe Diminished   Right Middle Lobe Diminished   Right Lower Lobe Diminished   Left Upper Lobe Diminished   Left Lower Lobe Diminished   Additional Respiratory  Assessments   Resp 18   Position Semi-Solano's   Non-Surgical Airway Endo Tracheal Tube   Placement Date/Time: 12/01/20 1442   Timeout: Patient  Placed By: In ED  Inserted by: Trung Noble  Insertion attempts: 1  Airway Device: Endo Tracheal Tube  Size: 7  Placement Verified By[de-identified] Auscultation   Secured Location Center  (from Left)

## 2020-12-09 NOTE — PROGRESS NOTES
Shift assessment completed, see flow sheet. RASS -1, opening eyes and responding to verbal commands. Intubated and sedated on AC # 7 ETT, at 22 LL. RR 18 /  / FiO2 30% / PEEP 5.      NSR on monitor, HR 85, BP 91/55 (66), SpO2 95%. Respirations are easy, even, and unlabored. Bilateral lung sounds diminished. OG in place at 52, with TF at 30 mL/hr. 10 mL residual.     BAILEY triple lumen PICC in place, WNL with the following infusions:  Propofol infusing at 10 mcg/kg/min  Precedex infusing at 0.7 mcg/kg/hr     Bernard in place and patent with STAT lock, draining hazy yellow urine. Bilateral soft wrist restraints in place for pt safety.      PM meds given w/o complications. Bernard care and oral care completed at this time.   Pt son Darcy Sahil update at this time.     Call light within reach, bed in lowest position, bed alarm on, will continue to monitor

## 2020-12-09 NOTE — PROGRESS NOTES
Pt HR went up to the 120s, RR in the 30s, without any stimulation. Pt moving around in bed. PRN versed given at this time. Will continue to monitor.

## 2020-12-09 NOTE — PROGRESS NOTES
12/07/20 0443 12/08/20 0435 12/09/20 0429   WBC 5.5 4.6 5.0   HGB 7.8* 7.1* 7.1*   HCT 25.4* 22.4* 23.1*   MCV 68.3* 68.2* 69.5*    199 214     BMP:   Recent Labs     12/07/20 0443 12/08/20 0435 12/09/20 0429    137 140   K 3.7 3.5 3.9    102 103   CO2 28 32 31   BUN 13 17 25*   CREATININE <0.5* <0.5* <0.5*     LIVER PROFILE:   Recent Labs     12/07/20 0443 12/08/20 0435 12/09/20 0429   AST 19 16 16   ALT 11 9* 9*   BILITOT <0.2 <0.2 <0.2   ALKPHOS 39* 35* 37*     PT/INR: No results for input(s): PROTIME, INR in the last 72 hours. APTT: No results for input(s): APTT in the last 72 hours. UA:  No results for input(s): NITRITE, COLORU, PHUR, LABCAST, WBCUA, RBCUA, MUCUS, TRICHOMONAS, YEAST, BACTERIA, CLARITYU, SPECGRAV, LEUKOCYTESUR, UROBILINOGEN, BILIRUBINUR, BLOODU, GLUCOSEU, AMORPHOUS in the last 72 hours. Invalid input(s): Euel Sinks    Cultures:  12/1/2020 SARS-CoV-2 positive  12/2/2020 respiratory culture NRF  12/2/2020 urine no growth    Films:  CXR 12/8/2020 ETT okay     ASSESSMENT:  · Acute hypoxemic respiratory failure   · COVID-19 pneumonia  · Cachexia/failure to thrive  · Acute metabolic encephalopathy superimposed on underlying dementia  · Acute anemia  · Loose stools  · Possible brief asystolic arrest early am 00/8/72 with unclear etiology - 30 seconds before ROSC     PLAN:  · Mechanical ventilation per my orders. The ventilator was adjusted by me at the bedside for unstable, life threatening respiratory failure. Wean oxygen as tolerated.    · Low tidal volume ventilation    · Propofol drip for sedation target RASS -2; prn versed and fentanyl; Precedex to assist with weaning  · Daily SAT/SBT  · Conservative fluid strategy -have not been able to diurese with low blood pressures    · D#9 Decadron 6 mg daily  · Completed 5 days Remdesevir; s/p 1 U CCP 12/3/2020  · Completed 7 days ceftriaxone and 5 days azithromycin  · S/P 1 U PRBC  · FC  · ICU care -  lovenox once daily and follow hemoglobin; on BID protonix  · NOK Carlos Cons (son) - I called & discussed care with him. No long term ventilation, but continue for now. Total critical care time caring for this patient with life threatening, unstable organ failure, including direct patient contact, management of life support systems, review of data including imaging and labs, discussions with other team members and physicians is 31 minutes so far today, excluding procedures.

## 2020-12-09 NOTE — PROGRESS NOTES
Report given to Olaf Jiménez RN at bedside for transfer of care. Pt resting comfortably at this time, all needs met at time of transfer of care.   Elisabeth Martinez RN

## 2020-12-09 NOTE — PROGRESS NOTES
IM Progress Note    Admit Date:  12/1/2020  8    Interval history:  Admitted with covid 19 infection , resp failure  Emergently intubated in ER   Given 1 unit PRBC for anemia     91/4- brief asystolic for 30 sec during nursing care, had CPR for few sec and no meds, ROSC      12/5- no acute events , failed SBT    12/6- pt remains on vent, tolerated SBT few min    12/7   Could not tolerate SBT  Started on precedex       12/8   On SBT   on precedex     12/9   Failed SBT today    Subjective:-    Ms. Bosch Manifold not seen at bedside today    sedated in bed, no distress      Objective:   /72   Pulse 112   Temp 98.8 °F (37.1 °C) (Bladder)   Resp 23   Ht 5' 2\" (1.575 m)   Wt 81 lb (36.7 kg)   SpO2 93%   BMI 14.82 kg/m²       Intake/Output Summary (Last 24 hours) at 12/9/2020 1046  Last data filed at 12/9/2020 0740  Gross per 24 hour   Intake 1725 ml   Output 1100 ml   Net 625 ml       Physical Exam:    Seen thro glass doors  D/W nurse     Intubated,sedated.       Medications:   Scheduled Medications:    enoxaparin  30 mg Subcutaneous Daily    sodium chloride flush  10 mL Intravenous 2 times per day    ipratropium-albuterol  1 ampule Inhalation Q4H    sodium chloride  20 mL Intravenous Once    dexamethasone  6 mg Intravenous Daily    pantoprazole  40 mg Intravenous BID    folic acid  1 mg Oral Daily    sodium chloride flush  10 mL Intravenous 2 times per day    chlorhexidine  15 mL Mouth/Throat BID     I   dexmedetomidine (PRECEDEX) IV infusion 0.8 mcg/kg/hr (12/09/20 1023)    propofol 10 mcg/kg/min (12/08/20 0915)    dextrose       sodium chloride flush, sodium chloride flush, acetaminophen **OR** acetaminophen, polyethylene glycol, promethazine **OR** ondansetron, glucose, dextrose, glucagon (rDNA), dextrose, potassium chloride **OR** potassium alternative oral replacement **OR** potassium chloride, magnesium sulfate, midazolam, fentanNYL    Lab Data:  Recent Labs     12/07/20  0443 12/08/20  9876 12/09/20  0429   WBC 5.5 4.6 5.0   HGB 7.8* 7.1* 7.1*   HCT 25.4* 22.4* 23.1*   MCV 68.3* 68.2* 69.5*    199 214     Recent Labs     12/07/20  0443 12/08/20  0435 12/09/20  0429    137 140   K 3.7 3.5 3.9    102 103   CO2 28 32 31   BUN 13 17 25*   CREATININE <0.5* <0.5* <0.5*     No results for input(s): CKTOTAL, CKMB, CKMBINDEX, TROPONINI in the last 72 hours. Coagulation:   Lab Results   Component Value Date    INR 0.88 03/04/2018    APTT 36.9 03/04/2018     Cardiac markers:   Lab Results   Component Value Date    CKTOTAL 121 06/01/2017    TROPONINI <0.01 12/04/2020         Lab Results   Component Value Date    ALT 9 (L) 12/09/2020    AST 16 12/09/2020    ALKPHOS 37 (L) 12/09/2020    BILITOT <0.2 12/09/2020       Lab Results   Component Value Date    INR 0.88 03/04/2018    INR 0.86 06/01/2017    INR 0.90 10/07/2013    PROTIME 10.0 03/04/2018    PROTIME 10.0 06/01/2017    PROTIME 10.1 10/07/2013       Radiology    EKG:  I have reviewed the EKG with the following interpretation:   Sinus tachycardia with ventricular rate 111 bpm     RADIOLOGY  XR CHEST PORTABLE   Final Result   Technically limited study performed portably with arms overlying the lower   chest.  Asymmetric ground-glass opacification appears present in the right   lower lung zone which may represent edema or developing pneumonitis.          cxr    Stable appropriate positions of support apparatus. 2. Stable multifocal pneumonia.             Ct head    No acute intracranial finding.  MRI may be obtained if clinically indicated.           cxr    Stable lines and catheters.         Persistent multifocal interstitial opacities most pronounced both lung bases. Slight improved aeration of the upper and mid lungs.       covid positive 12/1         ASSESSMENT/PLAN:     #Acute hypoxic and hypercarbic respiratory failure  - 2/2 COVID 19  - She was intubated by ER provider, mechanical ventilation to be managed by pulmonary  - minimal support on vent, ongoing weaning trials per pulm. gets very agitated and anxious during trials. Started on Precedex  Failed SBT again today     #COVID 19 Pneumonia  -Pulmonary consulted  -IV Decadron day #9- -s/premdesivir D5 and s/p convalescent plasma on 12/1  -completed Rocephin and Zithromax day #5  - imaging with stable  ASD   - contact plus isolation        #Sepsis  - tachycardia, tachypnea, leucocytosis  -Secondary to COVID-19 pneumonia  -Antibiotics as above  -Follow-up cultures  -IV fluids given and improved  blood pressure    Brief Asystole with cpr requirement on 12/3 , during nursing care  - no meds given, ROSC with in few sec  - stable since      #Acute metabolic encephalopathy  -Suspect related to hypercarbia, sepsis, acute infection  -She has a nonfocal neurologic exam, she is moving all extremities. She does have a deformed left upper extremity secondary to a distal humerus fracture which was never repaired. -Management of sepsis, respiratory failure and infection as above  -Checked CT head - neg    -Checked TSH (normal),  Low folic acid , replace  -Checked ethanol level- neg       #Hyperkalemia-> hypokalemia   - ? Hemolyzed specimen  -Received IV NS, calcium gluconate and IV insulin-partial doses- per ER nurse. Repeat potassium is normalized, then low- replacement ordered         # Acute blood loss anemia, on chronic anemia  History of peptic ulcer disease  -Gastric ulcer noted on EGD in 2013  -Start IV PPI bid , h.h dropped from 8.7 to 6.5, partly with IV fluid boluses   - s.p 1 unit pRBC   - iron studies with folate and iron def, started supplements   Hg 7.1 today      #Left distal humerus fracture 2018  -Chronic deformity of the left upper extremity     #Reported history of alcohol abuse  -Unclear if she is still consuming alcohol. Checked an ethanol level- negative     #Severe PCM  - extreme muscle wasting.   Will need dietitian eval when extubated  - checked TSH wnl     DVT Prophylaxis: Lovenox holding for anemia   Diet: started TF  Code Status: Full Code     DISPO: ICU care      Giovani Calloway MD 12/9/2020 10:46 AM

## 2020-12-10 ENCOUNTER — APPOINTMENT (OUTPATIENT)
Dept: GENERAL RADIOLOGY | Age: 70
DRG: 870 | End: 2020-12-10
Payer: MEDICARE

## 2020-12-10 LAB
A/G RATIO: 1 (ref 1.1–2.2)
ABO/RH: NORMAL
ALBUMIN SERPL-MCNC: 2.3 G/DL (ref 3.4–5)
ALP BLD-CCNC: 34 U/L (ref 40–129)
ALT SERPL-CCNC: 9 U/L (ref 10–40)
ANION GAP SERPL CALCULATED.3IONS-SCNC: 4 MMOL/L (ref 3–16)
ANTIBODY SCREEN: NORMAL
AST SERPL-CCNC: 14 U/L (ref 15–37)
BASE EXCESS ARTERIAL: 2.2 MMOL/L (ref -3–3)
BASOPHILS ABSOLUTE: 0 K/UL (ref 0–0.2)
BASOPHILS RELATIVE PERCENT: 0.2 %
BILIRUB SERPL-MCNC: <0.2 MG/DL (ref 0–1)
BLOOD BANK DISPENSE STATUS: NORMAL
BLOOD BANK DISPENSE STATUS: NORMAL
BLOOD BANK PRODUCT CODE: NORMAL
BLOOD BANK PRODUCT CODE: NORMAL
BPU ID: NORMAL
BPU ID: NORMAL
BUN BLDV-MCNC: 20 MG/DL (ref 7–20)
CALCIUM SERPL-MCNC: 7.8 MG/DL (ref 8.3–10.6)
CARBOXYHEMOGLOBIN ARTERIAL: 0.9 % (ref 0–1.5)
CHLORIDE BLD-SCNC: 103 MMOL/L (ref 99–110)
CO2: 31 MMOL/L (ref 21–32)
CREAT SERPL-MCNC: <0.5 MG/DL (ref 0.6–1.2)
DESCRIPTION BLOOD BANK: NORMAL
DESCRIPTION BLOOD BANK: NORMAL
EOSINOPHILS ABSOLUTE: 0.1 K/UL (ref 0–0.6)
EOSINOPHILS RELATIVE PERCENT: 1.1 %
GFR AFRICAN AMERICAN: >60
GFR NON-AFRICAN AMERICAN: >60
GLOBULIN: 2.4 G/DL
GLUCOSE BLD-MCNC: 116 MG/DL (ref 70–99)
GLUCOSE BLD-MCNC: 123 MG/DL (ref 70–99)
GLUCOSE BLD-MCNC: 124 MG/DL (ref 70–99)
GLUCOSE BLD-MCNC: 131 MG/DL (ref 70–99)
HCO3 ARTERIAL: 26.3 MMOL/L (ref 21–29)
HCT VFR BLD CALC: 22.6 % (ref 36–48)
HEMOGLOBIN, ART, EXTENDED: 7.6 G/DL (ref 12–16)
HEMOGLOBIN: 6.9 G/DL (ref 12–16)
LYMPHOCYTES ABSOLUTE: 0.6 K/UL (ref 1–5.1)
LYMPHOCYTES RELATIVE PERCENT: 12.7 %
MAGNESIUM: 1.8 MG/DL (ref 1.8–2.4)
MCH RBC QN AUTO: 21.9 PG (ref 26–34)
MCHC RBC AUTO-ENTMCNC: 30.6 G/DL (ref 31–36)
MCV RBC AUTO: 71.4 FL (ref 80–100)
METHEMOGLOBIN ARTERIAL: 0.3 %
MONOCYTES ABSOLUTE: 0.4 K/UL (ref 0–1.3)
MONOCYTES RELATIVE PERCENT: 8.6 %
NEUTROPHILS ABSOLUTE: 3.4 K/UL (ref 1.7–7.7)
NEUTROPHILS RELATIVE PERCENT: 77.4 %
O2 CONTENT ARTERIAL: 11 ML/DL
O2 SAT, ARTERIAL: 98.1 %
O2 THERAPY: ABNORMAL
PCO2 ARTERIAL: 38.6 MMHG (ref 35–45)
PDW BLD-RTO: 26.9 % (ref 12.4–15.4)
PERFORMED ON: ABNORMAL
PH ARTERIAL: 7.45 (ref 7.35–7.45)
PLATELET # BLD: 205 K/UL (ref 135–450)
PMV BLD AUTO: 8.5 FL (ref 5–10.5)
PO2 ARTERIAL: 107.6 MMHG (ref 75–108)
POTASSIUM REFLEX MAGNESIUM: 3.5 MMOL/L (ref 3.5–5.1)
RBC # BLD: 3.16 M/UL (ref 4–5.2)
SODIUM BLD-SCNC: 138 MMOL/L (ref 136–145)
TCO2 ARTERIAL: 27.5 MMOL/L
TOTAL PROTEIN: 4.7 G/DL (ref 6.4–8.2)
WBC # BLD: 4.4 K/UL (ref 4–11)

## 2020-12-10 PROCEDURE — 92526 ORAL FUNCTION THERAPY: CPT

## 2020-12-10 PROCEDURE — 2700000000 HC OXYGEN THERAPY PER DAY

## 2020-12-10 PROCEDURE — 6370000000 HC RX 637 (ALT 250 FOR IP): Performed by: INTERNAL MEDICINE

## 2020-12-10 PROCEDURE — 85025 COMPLETE CBC W/AUTO DIFF WBC: CPT

## 2020-12-10 PROCEDURE — 94761 N-INVAS EAR/PLS OXIMETRY MLT: CPT

## 2020-12-10 PROCEDURE — 2500000003 HC RX 250 WO HCPCS: Performed by: INTERNAL MEDICINE

## 2020-12-10 PROCEDURE — C9113 INJ PANTOPRAZOLE SODIUM, VIA: HCPCS | Performed by: INTERNAL MEDICINE

## 2020-12-10 PROCEDURE — 83735 ASSAY OF MAGNESIUM: CPT

## 2020-12-10 PROCEDURE — 86850 RBC ANTIBODY SCREEN: CPT

## 2020-12-10 PROCEDURE — 99232 SBSQ HOSP IP/OBS MODERATE 35: CPT | Performed by: INTERNAL MEDICINE

## 2020-12-10 PROCEDURE — 86900 BLOOD TYPING SEROLOGIC ABO: CPT

## 2020-12-10 PROCEDURE — 82803 BLOOD GASES ANY COMBINATION: CPT

## 2020-12-10 PROCEDURE — 6360000002 HC RX W HCPCS: Performed by: INTERNAL MEDICINE

## 2020-12-10 PROCEDURE — 2580000003 HC RX 258: Performed by: INTERNAL MEDICINE

## 2020-12-10 PROCEDURE — 6370000000 HC RX 637 (ALT 250 FOR IP): Performed by: PHYSICIAN ASSISTANT

## 2020-12-10 PROCEDURE — 94640 AIRWAY INHALATION TREATMENT: CPT

## 2020-12-10 PROCEDURE — 71045 X-RAY EXAM CHEST 1 VIEW: CPT

## 2020-12-10 PROCEDURE — P9016 RBC LEUKOCYTES REDUCED: HCPCS

## 2020-12-10 PROCEDURE — 92610 EVALUATE SWALLOWING FUNCTION: CPT

## 2020-12-10 PROCEDURE — 2000000000 HC ICU R&B

## 2020-12-10 PROCEDURE — 80053 COMPREHEN METABOLIC PANEL: CPT

## 2020-12-10 PROCEDURE — 36430 TRANSFUSION BLD/BLD COMPNT: CPT

## 2020-12-10 PROCEDURE — 99233 SBSQ HOSP IP/OBS HIGH 50: CPT | Performed by: INTERNAL MEDICINE

## 2020-12-10 PROCEDURE — 86901 BLOOD TYPING SEROLOGIC RH(D): CPT

## 2020-12-10 PROCEDURE — 36600 WITHDRAWAL OF ARTERIAL BLOOD: CPT

## 2020-12-10 PROCEDURE — 86923 COMPATIBILITY TEST ELECTRIC: CPT

## 2020-12-10 RX ORDER — SODIUM CHLORIDE 0.9 % (FLUSH) 0.9 %
10 SYRINGE (ML) INJECTION PRN
Status: DISCONTINUED | OUTPATIENT
Start: 2020-12-10 | End: 2020-12-10 | Stop reason: SDUPTHER

## 2020-12-10 RX ORDER — 0.9 % SODIUM CHLORIDE 0.9 %
20 INTRAVENOUS SOLUTION INTRAVENOUS ONCE
Status: COMPLETED | OUTPATIENT
Start: 2020-12-10 | End: 2020-12-10

## 2020-12-10 RX ORDER — FUROSEMIDE 10 MG/ML
20 INJECTION INTRAMUSCULAR; INTRAVENOUS
Status: COMPLETED | OUTPATIENT
Start: 2020-12-10 | End: 2020-12-10

## 2020-12-10 RX ORDER — IPRATROPIUM BROMIDE AND ALBUTEROL SULFATE 2.5; .5 MG/3ML; MG/3ML
1 SOLUTION RESPIRATORY (INHALATION) EVERY 4 HOURS PRN
Status: DISCONTINUED | OUTPATIENT
Start: 2020-12-10 | End: 2020-12-23 | Stop reason: HOSPADM

## 2020-12-10 RX ORDER — DEXAMETHASONE SODIUM PHOSPHATE 4 MG/ML
4 INJECTION, SOLUTION INTRA-ARTICULAR; INTRALESIONAL; INTRAMUSCULAR; INTRAVENOUS; SOFT TISSUE DAILY
Status: DISCONTINUED | OUTPATIENT
Start: 2020-12-11 | End: 2020-12-12 | Stop reason: CLARIF

## 2020-12-10 RX ADMIN — PANTOPRAZOLE SODIUM 40 MG: 40 INJECTION, POWDER, FOR SOLUTION INTRAVENOUS at 07:46

## 2020-12-10 RX ADMIN — SODIUM CHLORIDE 20 ML: 9 INJECTION, SOLUTION INTRAVENOUS at 11:55

## 2020-12-10 RX ADMIN — POTASSIUM BICARBONATE 40 MEQ: 782 TABLET, EFFERVESCENT ORAL at 05:35

## 2020-12-10 RX ADMIN — Medication 15 ML: at 07:45

## 2020-12-10 RX ADMIN — FOLIC ACID 1 MG: 1 TABLET ORAL at 07:46

## 2020-12-10 RX ADMIN — SODIUM CHLORIDE 20 ML: 9 INJECTION, SOLUTION INTRAVENOUS at 11:54

## 2020-12-10 RX ADMIN — IPRATROPIUM BROMIDE AND ALBUTEROL SULFATE 1 AMPULE: 2.5; .5 SOLUTION RESPIRATORY (INHALATION) at 08:00

## 2020-12-10 RX ADMIN — MIDAZOLAM HYDROCHLORIDE 2 MG: 1 INJECTION, SOLUTION INTRAMUSCULAR; INTRAVENOUS at 07:46

## 2020-12-10 RX ADMIN — FENTANYL CITRATE 50 MCG: 50 INJECTION, SOLUTION INTRAMUSCULAR; INTRAVENOUS at 07:46

## 2020-12-10 RX ADMIN — Medication 10 ML: at 19:26

## 2020-12-10 RX ADMIN — MIDAZOLAM HYDROCHLORIDE 2 MG: 1 INJECTION, SOLUTION INTRAMUSCULAR; INTRAVENOUS at 05:02

## 2020-12-10 RX ADMIN — Medication 10 ML: at 07:46

## 2020-12-10 RX ADMIN — PANTOPRAZOLE SODIUM 40 MG: 40 INJECTION, POWDER, FOR SOLUTION INTRAVENOUS at 19:25

## 2020-12-10 RX ADMIN — FUROSEMIDE 20 MG: 10 INJECTION, SOLUTION INTRAMUSCULAR; INTRAVENOUS at 14:07

## 2020-12-10 RX ADMIN — DEXAMETHASONE SODIUM PHOSPHATE 6 MG: 10 INJECTION, SOLUTION INTRAMUSCULAR; INTRAVENOUS at 07:46

## 2020-12-10 RX ADMIN — SODIUM CHLORIDE 0.9 MCG/KG/HR: 9 INJECTION, SOLUTION INTRAVENOUS at 02:03

## 2020-12-10 RX ADMIN — IPRATROPIUM BROMIDE AND ALBUTEROL SULFATE 1 AMPULE: 2.5; .5 SOLUTION RESPIRATORY (INHALATION) at 04:13

## 2020-12-10 RX ADMIN — PROPOFOL 20 MCG/KG/MIN: 10 INJECTION, EMULSION INTRAVENOUS at 04:56

## 2020-12-10 ASSESSMENT — PAIN SCALES - GENERAL
PAINLEVEL_OUTOF10: 0

## 2020-12-10 ASSESSMENT — ENCOUNTER SYMPTOMS
VOMITING: 0
SHORTNESS OF BREATH: 0
NAUSEA: 0
COUGH: 0
DIARRHEA: 0

## 2020-12-10 ASSESSMENT — PULMONARY FUNCTION TESTS
PIF_VALUE: 24
PIF_VALUE: 24

## 2020-12-10 NOTE — PLAN OF CARE
Problem: Infection - Central Venous Catheter-Associated Bloodstream Infection:  Goal: Will show no infection signs and symptoms  Description: Will show no infection signs and symptoms  Outcome: Ongoing     Problem: Nutrition  Goal: Optimal nutrition therapy  Outcome: Ongoing  Goal: Understanding of nutritional guidelines  Outcome: Ongoing     Problem: Skin Integrity:  Goal: Will show no infection signs and symptoms  Description: Will show no infection signs and symptoms  Outcome: Ongoing  Goal: Absence of new skin breakdown  Description: Absence of new skin breakdown  Outcome: Ongoing     Problem: Falls - Risk of:  Goal: Will remain free from falls  Description: Will remain free from falls  Outcome: Ongoing  Goal: Absence of physical injury  Description: Absence of physical injury  Outcome: Ongoing     Problem: Restraint Use - Nonviolent/Non-Self-Destructive Behavior:  Goal: Absence of restraint indications  Description: Absence of restraint indications  Outcome: Ongoing  Goal: Absence of restraint-related injury  Description: Absence of restraint-related injury  Outcome: Ongoing     Problem: Risk for Fluid Volume Deficit  Goal: Maintain normal heart rhythm  Outcome: Ongoing  Goal: Maintain absence of muscle cramping  Outcome: Ongoing  Goal: Maintain normal serum potassium, sodium, calcium, phosphorus, and pH  Outcome: Ongoing     Problem: Nutrition Deficits  Goal: Optimize nutrtional status  Outcome: Ongoing     Problem: Isolation Precautions - Risk of Spread of Infection  Goal: Prevent transmission of infection  Outcome: Ongoing     Problem:  Body Temperature -  Risk of, Imbalanced  Goal: Ability to maintain a body temperature within defined limits  Outcome: Ongoing  Goal: Will regain or maintain usual level of consciousness  Outcome: Ongoing  Goal: Complications related to the disease process, condition or treatment will be avoided or minimized  Outcome: Ongoing     Problem: Breathing Pattern - Ineffective  Goal: Ability to achieve and maintain a regular respiratory rate  Outcome: Ongoing     Problem: Gas Exchange - Impaired  Goal: Absence of hypoxia  Outcome: Ongoing  Goal: Promote optimal lung function  Outcome: Ongoing     Problem: Airway Clearance - Ineffective  Goal: Achieve or maintain patent airway  Outcome: Ongoing

## 2020-12-10 NOTE — PROGRESS NOTES
12/10/20 0415   Vent Information   Equipment Changed HME   Vent Type 840   Vent Mode AC/VC   Vt Ordered 350 mL   Rate Set 18 bmp   Peak Flow 50 L/min   FiO2  30 %   SpO2 98 %   SpO2/FiO2 ratio 326.67   Sensitivity 3   PEEP/CPAP 5   Humidification Source HME   Vent Patient Data   Peak Inspiratory Pressure 24 cmH2O   Mean Airway Pressure 8.9 cmH20   Rate Measured 21 br/min   Vt Exhaled 345 mL   Minute Volume 5.91 Liters   I:E Ratio 1:2.1   Cough/Sputum   Sputum How Obtained Suctioned;Endotracheal   Cough Productive   Sputum Amount Small   Sputum Color Cloudy;Creamy   Tenacity Thick   Spontaneous Breathing Trial (SBT) RT Doc   Pulse 71   Breath Sounds   Right Upper Lobe Diminished   Right Middle Lobe Diminished   Right Lower Lobe Diminished   Left Upper Lobe Diminished   Left Lower Lobe Diminished   Additional Respiratory  Assessments   Resp 18   Position Semi-Solano's   Alarm Settings   High Pressure Alarm 45 cmH2O   Low Minute Volume Alarm 5 L/min   Apnea (secs) 20 secs   High Respiratory Rate 45 br/min   Low Exhaled Vt  200 mL   Non-Surgical Airway Endo Tracheal Tube   Placement Date/Time: 12/01/20 1442   Timeout: Patient  Placed By: In ED  Inserted by: Mark Garcias  Insertion attempts: 1  Airway Device: Endo Tracheal Tube  Size: 7  Placement Verified By[de-identified] Auscultation   Secured at 23 cm   Measured From [address] [employer]ale   Site Condition Dry

## 2020-12-10 NOTE — PLAN OF CARE
Problem: Nutrition  Intervention: Swallowing evaluation  Note: SLP completed evaluation. Please refer to notes in EMR. Problem: Nutrition  Intervention: Aspiration precautions  Note: SLP completed evaluation. Please refer to notes in EMR.      Jose Marroquin M.A., [facility] Staff #[id number]  Speech-Language Pathologist

## 2020-12-10 NOTE — PROGRESS NOTES
12/09/20 2330   Vent Information   $Ventilation $Subsequent Day   Skin Assessment Clean, dry, & intact   Equipment Changed HME   Vent Type 840   Vent Mode AC/VC   Vt Ordered 350 mL   Rate Set 18 bmp   Peak Flow 50 L/min   FiO2  30 %   SpO2 96 %   SpO2/FiO2 ratio 320   Sensitivity 3   PEEP/CPAP 5   Humidification Source HME   Vent Patient Data   Peak Inspiratory Pressure 24 cmH2O   Mean Airway Pressure 8.9 cmH20   Rate Measured 19 br/min   Vt Exhaled 341 mL   Minute Volume 5.98 Liters   I:E Ratio 1:1.9   Spontaneous Breathing Trial (SBT) RT Doc   Pulse 86   Additional Respiratory  Assessments   Resp 18   Position Semi-Solano's   Oral Care Completed?  Yes   Alarm Settings   High Pressure Alarm 45 cmH2O   Low Minute Volume Alarm 5 L/min   Apnea (secs) 20 secs   High Respiratory Rate 45 br/min   Low Exhaled Vt  200 mL

## 2020-12-10 NOTE — PROGRESS NOTES
IM Progress Note    Admit Date:  12/1/2020  9    Interval history:  Admitted with covid 19 infection , resp failure  Emergently intubated in ER   Given 1 unit PRBC for anemia     80/6- brief asystolic for 30 sec during nursing care, had CPR for few sec and no meds, ROSC      12/5- no acute events , failed SBT    12/6- pt remains on vent, tolerated SBT few min    12/7   Could not tolerate SBT  Started on precedex       12/8   On SBT   on precedex     12/9   Failed SBT today    12/10  Now on SBT for an hour r so  Doing well. On precedex        Objective:   BP (!) 72/56   Pulse 78   Temp 97.1 °F (36.2 °C) (Bladder)   Resp 15   Ht 5' 2\" (1.575 m)   Wt 88 lb 3.2 oz (40 kg)   SpO2 100%   BMI 16.13 kg/m²       Intake/Output Summary (Last 24 hours) at 12/10/2020 0900  Last data filed at 12/10/2020 0700  Gross per 24 hour   Intake 1245 ml   Output 1950 ml   Net -705 ml       Physical Exam:    Seen thro glass doors  D/W nurse     Intubated,sedated.       Medications:   Scheduled Medications:    sodium chloride  20 mL Intravenous Once    sodium chloride  20 mL Intravenous Once    sodium chloride flush  10 mL Intravenous 2 times per day    ipratropium-albuterol  1 ampule Inhalation Q4H    sodium chloride  20 mL Intravenous Once    dexamethasone  6 mg Intravenous Daily    pantoprazole  40 mg Intravenous BID    folic acid  1 mg Oral Daily    chlorhexidine  15 mL Mouth/Throat BID     I   dexmedetomidine (PRECEDEX) IV infusion 0.7 mcg/kg/hr (12/10/20 0746)    propofol 10 mcg/kg/min (12/10/20 0746)    dextrose       furosemide, sodium chloride, sodium chloride flush, acetaminophen **OR** acetaminophen, polyethylene glycol, promethazine **OR** ondansetron, glucose, dextrose, glucagon (rDNA), dextrose, potassium chloride **OR** potassium alternative oral replacement **OR** potassium chloride, magnesium sulfate, midazolam, fentanNYL    Lab Data:  Recent Labs     12/08/20  0435 12/09/20  0429 12/10/20  0400   WBC 4.6 5.0 4.4   HGB 7.1* 7.1* 6.9*   HCT 22.4* 23.1* 22.6*   MCV 68.2* 69.5* 71.4*    214 205     Recent Labs     12/08/20  0435 12/09/20  0429 12/10/20  0400    140 138   K 3.5 3.9 3.5    103 103   CO2 32 31 31   BUN 17 25* 20   CREATININE <0.5* <0.5* <0.5*     No results for input(s): CKTOTAL, CKMB, CKMBINDEX, TROPONINI in the last 72 hours. Coagulation:   Lab Results   Component Value Date    INR 0.88 03/04/2018    APTT 36.9 03/04/2018     Cardiac markers:   Lab Results   Component Value Date    CKTOTAL 121 06/01/2017    TROPONINI <0.01 12/04/2020         Lab Results   Component Value Date    ALT 9 (L) 12/10/2020    AST 14 (L) 12/10/2020    ALKPHOS 34 (L) 12/10/2020    BILITOT <0.2 12/10/2020       Lab Results   Component Value Date    INR 0.88 03/04/2018    INR 0.86 06/01/2017    INR 0.90 10/07/2013    PROTIME 10.0 03/04/2018    PROTIME 10.0 06/01/2017    PROTIME 10.1 10/07/2013       Radiology    EKG:  I have reviewed the EKG with the following interpretation:   Sinus tachycardia with ventricular rate 111 bpm     RADIOLOGY  XR CHEST PORTABLE   Final Result   Technically limited study performed portably with arms overlying the lower   chest.  Asymmetric ground-glass opacification appears present in the right   lower lung zone which may represent edema or developing pneumonitis.          cxr    Stable appropriate positions of support apparatus. 2. Stable multifocal pneumonia.             Ct head    No acute intracranial finding.  MRI may be obtained if clinically indicated.           cxr    Stable lines and catheters.         Persistent multifocal interstitial opacities most pronounced both lung bases. Slight improved aeration of the upper and mid lungs.       covid positive 12/1         ASSESSMENT/PLAN:     #Acute hypoxic and hypercarbic respiratory failure  - 2/2 COVID 19  - She was intubated by ER provider, mechanical ventilation to be managed by pulmonary  - minimal support on vent, ongoing weaning trials per pulm. gets very agitated and anxious during trials. Started on Precedex  Doing better on SBT today     #COVID 19 Pneumonia  -Pulmonary consulted  -IV Decadron day #10- -s/premdesivir D5 and s/p convalescent plasma on 12/1  -completed Rocephin and Zithromax day #5  - imaging with stable  ASD   - contact plus isolation        #Sepsis  - tachycardia, tachypnea, leucocytosis  -Secondary to COVID-19 pneumonia  -Antibiotics as above  -Follow-up cultures  -IV fluids given and improved  blood pressure    Brief Asystole with cpr requirement on 12/3 , during nursing care  - no meds given, ROSC with in few sec  - stable since      #Acute metabolic encephalopathy  -Suspect related to hypercarbia, sepsis, acute infection  -She has a nonfocal neurologic exam, she is moving all extremities. She does have a deformed left upper extremity secondary to a distal humerus fracture which was never repaired. -Management of sepsis, respiratory failure and infection as above  -Checked CT head - neg    -Checked TSH (normal),  Low folic acid , replace  -Checked ethanol level- neg       #Hyperkalemia-> hypokalemia   - ? Hemolyzed specimen  -Received IV NS, calcium gluconate and IV insulin-partial doses- per ER nurse. Repeat potassium is normalized, then low- replacement ordered         # Acute blood loss anemia, on chronic anemia  History of peptic ulcer disease  -Gastric ulcer noted on EGD in 2013  -Start IV PPI bid , h.h dropped from 8.7 to 6.5, partly with IV fluid boluses   - s.p 1 unit pRBC   - iron studies with folate and iron def, started supplements   Hg 6.9 today - transfuse 1 unit of red cells today     #Left distal humerus fracture 2018  -Chronic deformity of the left upper extremity     #Reported history of alcohol abuse  -Unclear if she is still consuming alcohol. Checked an ethanol level- negative     #Severe PCM  - extreme muscle wasting.   Will need dietitian eval when extubated  - checked TSH wnl     DVT Prophylaxis: Lovenox holding for anemia   Diet: started TF  Code Status: Full Code     DISPO: ICU care      Prince Velasco MD 12/10/2020 9:00 AM

## 2020-12-10 NOTE — PROGRESS NOTES
Report given to Aleena De RN at bedside for transfer of care. Pt resting comfortably at this time.  Pamela Martinez RN

## 2020-12-10 NOTE — PROGRESS NOTES
Pulmonary & Critical Care Medicine ICU Progress Note      CC: COVID-19, hypoxemia, AMS    Events of Last 24 hours: On SBT required 15 cm water pressure support    Invasive Lines:   12/4/2020 PICC    MV: 12/1/2020  Vent Mode: AC/VC Rate Set: 18 bmp/Vt Ordered: 350 mL/ /FiO2 : 30 %  Recent Labs     12/09/20  0410 12/10/20  0400   PHART 7.448 7.452*   UAF0KMX 44.2 38.6   PO2ART 75.6 107.6       IV:   dexmedetomidine (PRECEDEX) IV infusion 0.9 mcg/kg/hr (12/10/20 0203)    propofol 20 mcg/kg/min (12/10/20 0456)    dextrose         EXAM:  BP (!) 86/59   Pulse 95   Temp 97.2 °F (36.2 °C) (Bladder)   Resp 23   Ht 5' 2\" (1.575 m)   Wt 88 lb 3.2 oz (40 kg)   SpO2 98%   BMI 16.13 kg/m²  on vent      Intake/Output Summary (Last 24 hours) at 12/10/2020 0605  Last data filed at 12/10/2020 0524  Gross per 24 hour   Intake 996 ml   Output 1875 ml   Net -879 ml     General: intubated, no acute distress, cachectic  Eyes: PERRL. No sclera icterus. No conjunctival injection. ENT: No discharge. Pharynx with ETT. Neck: Trachea midline. Normal thyroid. Resp: No accessory muscle use. No crackles. No wheezing. No rhonchi. No dullness on percussion. CV: Regular rate. Regular rhythm. No mumur or rub. No edema. GI: Non-tender. Non-distended. No masses. No organomegaly. Normal bowel sounds. No hernia. Skin: Warm and dry. No nodule on exposed extremities. No rash on exposed extremities. Lymph: No cervical LAD. No supraclavicular LAD. M/S: No cyanosis. No joint deformity. No clubbing. Neuro: Awake and following commands.  Patellar reflexes are symmetric  Psych: Unable to obtain because the patient is poorly communicative      Medications:   sodium chloride  20 mL Intravenous Once    enoxaparin  30 mg Subcutaneous Daily    sodium chloride flush  10 mL Intravenous 2 times per day    ipratropium-albuterol  1 ampule Inhalation Q4H    sodium chloride  20 mL Intravenous Once    dexamethasone  6 mg Intravenous Daily    pantoprazole  40 mg Intravenous BID    folic acid  1 mg Oral Daily    chlorhexidine  15 mL Mouth/Throat BID     PRN Meds:  sodium chloride, sodium chloride flush, acetaminophen **OR** acetaminophen, polyethylene glycol, promethazine **OR** ondansetron, glucose, dextrose, glucagon (rDNA), dextrose, potassium chloride **OR** potassium alternative oral replacement **OR** potassium chloride, magnesium sulfate, midazolam, fentanNYL    Results:  CBC:   Recent Labs     12/08/20  0435 12/09/20  0429 12/10/20  0400   WBC 4.6 5.0 4.4   HGB 7.1* 7.1* 6.9*   HCT 22.4* 23.1* 22.6*   MCV 68.2* 69.5* 71.4*    214 205     BMP:   Recent Labs     12/08/20  0435 12/09/20  0429 12/10/20  0400    140 138   K 3.5 3.9 3.5    103 103   CO2 32 31 31   BUN 17 25* 20   CREATININE <0.5* <0.5* <0.5*     LIVER PROFILE:   Recent Labs     12/08/20  0435 12/09/20  0429 12/10/20  0400   AST 16 16 14*   ALT 9* 9* 9*   BILITOT <0.2 <0.2 <0.2   ALKPHOS 35* 37* 34*     PT/INR: No results for input(s): PROTIME, INR in the last 72 hours. APTT: No results for input(s): APTT in the last 72 hours. UA:  No results for input(s): NITRITE, COLORU, PHUR, LABCAST, WBCUA, RBCUA, MUCUS, TRICHOMONAS, YEAST, BACTERIA, CLARITYU, SPECGRAV, LEUKOCYTESUR, UROBILINOGEN, BILIRUBINUR, BLOODU, GLUCOSEU, AMORPHOUS in the last 72 hours. Invalid input(s): Tim Brine    Cultures:  12/1/2020 SARS-CoV-2 positive  12/2/2020 respiratory culture NRF  12/2/2020 urine no growth    Films:  CXR 12/8/2020 ETT okay     ASSESSMENT:  · Acute hypoxemic respiratory failure   · COVID-19 pneumonia  · Cachexia/failure to thrive  · Acute metabolic encephalopathy superimposed on underlying dementia  · Acute anemia  · Loose stools  · Possible brief asystolic arrest early am 62/2/50 with unclear etiology - 30 seconds before ROSC     PLAN:  · Mechanical ventilation per my orders.   · Precedex to assist with weaning  · SAT/SBT  · Conservative fluid strategy -have not been able to diurese with low blood pressures -give 2 units of blood today with Lasix in between units  · D#9 Decadron 6 mg daily  · Completed 5 days Remdesevir; s/p 1 U CCP 12/3/2020  · Completed 7 days ceftriaxone and 5 days azithromycin  · S/P 1 U PRBC, 2 additional PRBC today   · ICU care -SCD, hold Lovenox and follow hemoglobin; on BID protonix  · FC  · MINERVA Carson Grey (son) - I called & discussed care with him. No long term ventilation, but continue for now. Addendum: Also seen after extubation. Patient is on 2 L and appears comfortable.

## 2020-12-10 NOTE — PROGRESS NOTES
Speech Language Pathology  Facility/Department: [facility] ICU   CLINICAL BEDSIDE SWALLOW EVALUATION    Instrumentation: Warranted once the patient is out of droplet+ precautions  Diet recommendation: IDDSI 1/4- mildly thick liquids (nectar) and puree solids respectfully. Pills crushed in puree as possible  Risk management: Frequent oral care, feed only when alert, ensure upright posture, monitor respiratory status. If s/s of aspiration develop, hold PO and contact SLP. NAME: Roc Davies  : 1950  MRN: [id number]    ADMISSION DATE: 2020  ADMITTING DIAGNOSIS: has Gastric ulcer; BELKYS (acute kidney injury) ([address] Failure to thrive; Alcohol abuse; Dementia ([address] Pulmonary nodules; Closed fracture of left proximal humerus; Closed fracture of left distal humerus; Acute metabolic encephalopathy; Pneumonia due to COVID-19 virus; Acute respiratory failure with hypoxemia ([address] Acute respiratory failure with hypoxia ([address] COVID-19 virus detected; Hyperkalemia; Sepsis with acute hypercapnic respiratory failure without septic shock ([address] Severe protein-calorie malnutrition ([address] and Anorexia on their problem list.  ONSET DATE: 2020    Recent Chest Xray/CT of Chest: (12/10/2020)  Impression    No significant change in appearance of the chest over the past 24 hours with    persistent diffuse bilateral pulmonary disease and trace pleural fluid. There also is diffuse bowel dilation suggesting ileus             Date of Eval: 12/10/2020  Evaluating Therapist: Lead [place]    Current Diet level:  Current Diet : NPO  Current Liquid Diet : NPO      Primary Complaint  Patient Complaint: Per admitting MD H&P \"The patient is a 78 y.o. female with reported history of dementia, reported history of alcohol abuse, prior admission to hospital for FTT (), gastric ulcer () who presented to [facility] ED with complaint of \"failure to thrive\".   Patient is awake on my exam, she will tell me her name but she cannot tell me where she is or the month of the year. She cannot provide any history due to her mentation. ER provider spoke with the patient's son who stated she stopped eating three for 4 days ago and would only take little sips of water. Family sent her to the ER for further evaluation due to this. In the ER she was afebrile, borderline low blood pressure, hypoxic on room air requiring 4 L of nasal cannula oxygen. She does not use home oxygen. Her rapid Covid test is positive. Chest x-ray showed asymmetric groundglass opacifications in the right lower lung. Initial potassium was 6.2 (?hemolyzed), her nurse tells me she received partial doses of insulin and calcium gluconate as well as IV fluids, repeat potassium level 3. 8. When I saw her in the emergency department she was lethargic, continually removing her oxygen with subsequent desaturations to the 70s. When wearing 4 L nasal cannula her oxygen saturation was stable. I later discussed with ER nurse, she was subsequently intubated for inability to be compliant with nasal cannula oxygen. She will be admitted to the ICU\"    Pain:  Pain Assessment  Pain Assessment: CPOT  Pain Level: 0  RASS Score: Agitated    Reason for Referral  Nikky Thomson was referred for a bedside swallow evaluation to assess the efficiency of her swallow function, identify signs and symptoms of aspiration and make recommendations regarding safe dietary consistencies, effective compensatory strategies, and safe eating environment. Impression  Dysphagia Diagnosis: Mild oral stage dysphagia;Mild to moderate pharyngeal stage dysphagia  Dysphagia Outcome Severity Scale: Level 5: Mild dysphagia- Distant supervision.  May need one diet consistency restricted     Medical record review/interview: Hx of dysphagia, intubation, failure to thrive  Predisposing dysphagia risk factors: Hx of dysphagia, dementia, ETOH abuse  Clinical signs of possible chronic dysphagia: Failure to thrive  Precipitating dysphagia risk factors: COVID 19, prolonged intubation    Vitals/labs:   Temp: 97.4  SpO2: 97 on 2L via NC  RR: 24  WBCs: 4.4    Cranial nerve exam:   CN V: ophthalmic, maxillary, and mandibular facial sensation-unable to assess. Appears grossly functional at baseline  CN VII: unable to assess. Appears grossly functional at baseline    CN IX/X: MPT:unable to assess-pt declined; pitch range: unable to assess-pt declined; vocal quality: weak; cough:absent volitionally  CN XII: unable to assess-pt declined    Laryngeal function exam:   Secretions: WNL  Vocal quality: Weak  MPT: unable to assess-pt declined   S/Z ratio: unable to assess-pt declined  Pitch range: unable to assess-pt declined  Cough: Absent volitionally    PO trials:   Ice: wet vocal quality 2/2  IDDSI 0: Immediate cough 3/3. Eventual wet vocal quality with throat clear. IDDSI 2: No clinical s/s of aspiration. Timely swallow, no anterior bolus loss, no change in vital signs  IDDSI 3: Did ntot assess  IDDSI 4: Prolonged ROB with moderate oral res noted post swallow. No clinical s/s of aspiration. No changes in vital signs  IDDSI 5: Pt declined  IDDSI 6: Pt declined  IDDSI 7: Pt declined  3 oz water: FAIL-immediate cough, unable to finish      Instrumentation: Warranted once the patient is out of droplet+ precautions  Diet recommendation: IDDSI 1/4- mildly thick liquids (nectar) and puree solids respectfully. Pills crushed in puree as possible  Risk management: Frequent oral care, feed only when alert, ensure upright posture, monitor respiratory status. If s/s of aspiration develop, hold PO and contact SLP. Assessment: Moderate oropharyngeal dysphagia suspected. Likely chronic and exacerbated by current conditions. Swallow prognosis is fair. Unable to assess with solids as the patient declined. Appears confused. Will continue to assess. Rec puree/nectar until further consistencies can be trialed.     Treatment Plan  Requires SLP Intervention: Yes  Duration/Frequency of Treatment: 2-4x/wk  D/C Recommendations: SNF       Recommended Diet and Intervention  Diet Solids Recommendation: Dysphagia Pureed (Dysphagia I)  Liquid Consistency Recommendation: Mildly Thick (Nectar)  Recommended Form of Meds: Crushed in puree as able  Recommendations: Modified barium swallow study; Dysphagia treatment;Assist feed  Therapeutic Interventions: Oral care;Diet tolerance monitoring; Therapeutic PO trials with SLP;Patient/Family education    Compensatory Swallowing Strategies  Compensatory Swallowing Strategies: Eat/Feed slowly; No straws;Upright as possible for all oral intake;Small bites/sips    Treatment/Goals  Short-term Goals  Timeframe for Short-term Goals: 4 days (12/14/2020)  Goal 1: The patient will tolerate recommended diet with no clinical s/s of aspiration 5/5  Goal 2: The patient will tolerate therapeutic diet upgrade trials with no clinical s/s of aspiration 5/5  Goal 3: The patient will demonstrate understanding of recommended compensatory strategies, given min cues  Long-term Goals  Timeframe for Long-term Goals: 7 days (12/17/2020)  Goal 1: The patient will tolerate least restrictive diet with no clinical s/s of aspiration for optimal nuitrition and hydration    General  Chart Reviewed: Yes  Comments: Chart reviewed for completion of assessment  Subjective  Subjective: The patient is seen in room at bedside with RN permission. She is alert. Requires cueing and redirection. Not agreeable to all aspects of assessment  Behavior/Cognition: Alert;Agitated; Requires cueing  Respiratory Status: O2 via nasual cannula  O2 Device: Nasal cannula  Liters of Oxygen: 2 L  Communication Observation: Functional  Follows Directions: Simple  Dentition: Some missing teeth  Patient Positioning: Upright in bed  Baseline Vocal Quality: Normal  Volitional Cough: Absent  Volitional Swallow: Absent  Prior Dysphagia History: Pt has hx of dysphagia with rec's for mechanical soft solids and thin ~5 years prior. Consistencies Administered: Thin - teaspoon; Thin - cup;Nectar - cup;Dysphagia Pureed (Dysphagia I)      Vision/Hearing  Vision  Vision: Within Functional Limits  Hearing  Hearing: Within functional limits    Oral Motor Deficits  Oral/Motor  Oral Motor: Within functional limits    Oral Phase Dysfunction  Oral Phase  Oral Phase: Exceptions  Oral Phase Dysfunction  Suspected Premature Bolus Loss: Thin - cup     Indicators of Pharyngeal Phase Dysfunction   Pharyngeal Phase  Pharyngeal Phase: Exceptions  Indicators of Pharyngeal Phase Dysfunction  Decreased Laryngeal Elevation: All  Wet Vocal Quality: Thin - cup  Throat Clearing - Delayed: Thin - cup  Cough - Immediate: Thin - cup    Prognosis  Prognosis  Prognosis for safe diet advancement: guarded  Barriers to reach goals: cognitive deficits;motivation;time post onset  Individuals consulted  Consulted and agree with results and recommendations: Patient;RN    Education  Patient Education: Patient educated  Patient Education Response: Needs reinforcement; No evidence of learning  Safety Devices in place: Yes  Type of devices: All fall risk precautions in place; Left in bed;Bed alarm in place;Call light within reach;Nurse notified       Therapy Time  SLP Individual Minutes  Time In: 8444  Time Out: 9895  Minutes: [address] [place] [address], [facility]  12/10/2020 3:24 PM  Isaura Rothman M.A., [phone]  Speech-Language Pathologist  Phone: [phone], [phone]

## 2020-12-11 ENCOUNTER — APPOINTMENT (OUTPATIENT)
Dept: GENERAL RADIOLOGY | Age: 70
DRG: 870 | End: 2020-12-11
Payer: MEDICARE

## 2020-12-11 LAB
A/G RATIO: 0.8 (ref 1.1–2.2)
ALBUMIN SERPL-MCNC: 2.8 G/DL (ref 3.4–5)
ALP BLD-CCNC: 46 U/L (ref 40–129)
ALT SERPL-CCNC: 12 U/L (ref 10–40)
ANION GAP SERPL CALCULATED.3IONS-SCNC: 7 MMOL/L (ref 3–16)
AST SERPL-CCNC: 19 U/L (ref 15–37)
BASOPHILS ABSOLUTE: 0 K/UL (ref 0–0.2)
BASOPHILS RELATIVE PERCENT: 0.6 %
BILIRUB SERPL-MCNC: 0.5 MG/DL (ref 0–1)
BUN BLDV-MCNC: 14 MG/DL (ref 7–20)
CALCIUM SERPL-MCNC: 8.7 MG/DL (ref 8.3–10.6)
CHLORIDE BLD-SCNC: 101 MMOL/L (ref 99–110)
CO2: 33 MMOL/L (ref 21–32)
CREAT SERPL-MCNC: <0.5 MG/DL (ref 0.6–1.2)
EOSINOPHILS ABSOLUTE: 0.1 K/UL (ref 0–0.6)
EOSINOPHILS RELATIVE PERCENT: 1.3 %
GFR AFRICAN AMERICAN: >60
GFR NON-AFRICAN AMERICAN: >60
GLOBULIN: 3.3 G/DL
GLUCOSE BLD-MCNC: 104 MG/DL (ref 70–99)
GLUCOSE BLD-MCNC: 114 MG/DL (ref 70–99)
GLUCOSE BLD-MCNC: 87 MG/DL (ref 70–99)
GLUCOSE BLD-MCNC: 90 MG/DL (ref 70–99)
GLUCOSE BLD-MCNC: 93 MG/DL (ref 70–99)
HCT VFR BLD CALC: 38.3 % (ref 36–48)
HEMOGLOBIN: 12.3 G/DL (ref 12–16)
LYMPHOCYTES ABSOLUTE: 0.6 K/UL (ref 1–5.1)
LYMPHOCYTES RELATIVE PERCENT: 7.7 %
MCH RBC QN AUTO: 25.1 PG (ref 26–34)
MCHC RBC AUTO-ENTMCNC: 32.1 G/DL (ref 31–36)
MCV RBC AUTO: 77.9 FL (ref 80–100)
MONOCYTES ABSOLUTE: 0.6 K/UL (ref 0–1.3)
MONOCYTES RELATIVE PERCENT: 7.5 %
NEUTROPHILS ABSOLUTE: 6.5 K/UL (ref 1.7–7.7)
NEUTROPHILS RELATIVE PERCENT: 82.9 %
PDW BLD-RTO: 30 % (ref 12.4–15.4)
PERFORMED ON: ABNORMAL
PERFORMED ON: ABNORMAL
PERFORMED ON: NORMAL
PERFORMED ON: NORMAL
PLATELET # BLD: 243 K/UL (ref 135–450)
PMV BLD AUTO: 8.6 FL (ref 5–10.5)
POTASSIUM REFLEX MAGNESIUM: 4 MMOL/L (ref 3.5–5.1)
RBC # BLD: 4.92 M/UL (ref 4–5.2)
SODIUM BLD-SCNC: 141 MMOL/L (ref 136–145)
TOTAL PROTEIN: 6.1 G/DL (ref 6.4–8.2)
WBC # BLD: 7.8 K/UL (ref 4–11)

## 2020-12-11 PROCEDURE — 80053 COMPREHEN METABOLIC PANEL: CPT

## 2020-12-11 PROCEDURE — 6360000002 HC RX W HCPCS: Performed by: INTERNAL MEDICINE

## 2020-12-11 PROCEDURE — 6370000000 HC RX 637 (ALT 250 FOR IP): Performed by: INTERNAL MEDICINE

## 2020-12-11 PROCEDURE — 97535 SELF CARE MNGMENT TRAINING: CPT

## 2020-12-11 PROCEDURE — 97112 NEUROMUSCULAR REEDUCATION: CPT

## 2020-12-11 PROCEDURE — 2580000003 HC RX 258: Performed by: INTERNAL MEDICINE

## 2020-12-11 PROCEDURE — 94761 N-INVAS EAR/PLS OXIMETRY MLT: CPT

## 2020-12-11 PROCEDURE — 97530 THERAPEUTIC ACTIVITIES: CPT

## 2020-12-11 PROCEDURE — 99232 SBSQ HOSP IP/OBS MODERATE 35: CPT | Performed by: INTERNAL MEDICINE

## 2020-12-11 PROCEDURE — 2700000000 HC OXYGEN THERAPY PER DAY

## 2020-12-11 PROCEDURE — 97166 OT EVAL MOD COMPLEX 45 MIN: CPT

## 2020-12-11 PROCEDURE — 1200000000 HC SEMI PRIVATE

## 2020-12-11 PROCEDURE — 97161 PT EVAL LOW COMPLEX 20 MIN: CPT

## 2020-12-11 PROCEDURE — C9113 INJ PANTOPRAZOLE SODIUM, VIA: HCPCS | Performed by: INTERNAL MEDICINE

## 2020-12-11 PROCEDURE — 85025 COMPLETE CBC W/AUTO DIFF WBC: CPT

## 2020-12-11 PROCEDURE — 99233 SBSQ HOSP IP/OBS HIGH 50: CPT | Performed by: INTERNAL MEDICINE

## 2020-12-11 RX ORDER — PANTOPRAZOLE SODIUM 40 MG/1
40 TABLET, DELAYED RELEASE ORAL
Status: DISCONTINUED | OUTPATIENT
Start: 2020-12-12 | End: 2020-12-12

## 2020-12-11 RX ADMIN — DEXAMETHASONE SODIUM PHOSPHATE 4 MG: 4 INJECTION, SOLUTION INTRAMUSCULAR; INTRAVENOUS at 08:59

## 2020-12-11 RX ADMIN — Medication 10 ML: at 09:00

## 2020-12-11 RX ADMIN — Medication 10 ML: at 20:38

## 2020-12-11 RX ADMIN — FOLIC ACID 1 MG: 1 TABLET ORAL at 08:59

## 2020-12-11 RX ADMIN — PANTOPRAZOLE SODIUM 40 MG: 40 INJECTION, POWDER, FOR SOLUTION INTRAVENOUS at 09:00

## 2020-12-11 ASSESSMENT — PAIN SCALES - GENERAL
PAINLEVEL_OUTOF10: 0

## 2020-12-11 NOTE — PROGRESS NOTES
Comprehensive Nutrition Assessment    Type and Reason for Visit:  Reassess    Nutrition Recommendations/Plan:   1. Continue general diet order + pureed solid consistency and mildly thick liquid consistency - consistency changes, per SLP. 2. Monitor appetite, acceptance + consumption of meals, and monitor need for ONS (if appropriate consistency and if patient will consume it). 3. Monitor nutrition-related labs, bowel function, and weight trends. Nutrition Assessment:  patient is declining from a nutritional standpoint AEB OG was removed when patient was extubated on 12/10/20 and patient is refusing to consume po nutrition and she remains at risk for further compromise d/t poor po intake, difficulty chewing and/or swallowing + need for altered consistencies, and severe malnutrition diagnosis; will continue general diet order + pureed solid consistency and mildly thick liquid consistency    Malnutrition Assessment:  Malnutrition Status:  Severe malnutrition    Context:  Acute Illness     Findings of the 6 clinical characteristics of malnutrition:  Energy Intake:  7 - 50% or less of estimated energy requirements for 5 or more days  Weight Loss:  Unable to assess     Body Fat Loss:  7 - Moderate body fat loss(observed through room window and on camera monitor d/t COVID-19 precautions) Orbital, Buccal region   Muscle Mass Loss:  7 - Moderate muscle mass loss(observed through room window and on camera monitor d/t COVID-19 precautions) Temples (temporalis)  Fluid Accumulation:  7 - Moderate to Severe Extremities(BUE + 2 pitting edema)   Strength:  Not Performed    Estimated Daily Nutrient Needs:  Energy (kcal):  1120 - 1200 kcals based on 28-30 kcals/kg/CBW; Weight Used for Energy Requirements:  Current     Protein (g):  68 - 72 g protein based on 1.7-1.8 g/kg/CBW;  Weight Used for Protein Requirements:  Current        Fluid (ml/day):  1120 - 1200 ml; Method Used for Fluid Requirements:  1 ml/kcal Planning:     Too soon to determine     Electronically signed by Denice Rinaldi RD, LD on 12/11/20 at 10:01 AM EST    Contact: [phone]

## 2020-12-11 NOTE — PROGRESS NOTES
Updated son per telephone r/t transfer, pt status, and daily care. Son clarified pt is not on home oxygen. Pt assisted to call son by staff, for family time. Pt still had minimal responses to staff and to son. Pt titrated down to 1.5 L/NC/O2.

## 2020-12-11 NOTE — PROGRESS NOTES
min    Standing: Not tested; Not Tested  Comments: unsafe to attempt    Bed Mobility   Supine to Sit:    Max A  and 2 persons  Sit to Supine:   Max A  and 2 persons  Rolling: Max A  and 2 persons  Scooting in sitting: Max A  and 2 persons  Scooting in supine: Total A and 2 persons    Transfer Training     Sit to stand:   Not Tested  Stand to sit:   Not Tested  Bed to Chair:   Not Tested with use of N/A    Gait gait deferred due to difficulty with transfers; pt ambulated 0 ft. Stair Training deferred, pt unsafe/ not appropriate to complete stairs at this time    Activity Tolerance   Pt completed therapy session with SOB noted with lying flat to adjust position  SpO2: 92% on 1.5 L   HR: 113   BP: 180/108 leg, 169/94 left lower arm    Positioning Needs   Pt in bed, alarm set, positioned in proper neutral alignment and pressure relief provided. Call light provided and all needs within reach    Exercises Initiated  all completed bilaterally unless indicated  Patient was given AAROM for the LEs for 10 reps x 2 sets in supine position in all available planes of motion. Other  None. Patient/Family Education   Pt educated on role of inpatient PT, POC, importance of continued activity, DC recommendations, safety awareness, transfer techniques, pursed lip breathing, energy conservation, pacing activity and calling for assist with mobility. Assessment  Pt seen for Physical Therapy evaluation in acute care setting. Patient presented with oriented to person only. She was uable to hold meaningful conversation and was trying to grab all wires of the monitor. Patient was unsafe to be left in the chair since she was trying to get out of the bed and losing balance on the R side. Pt demonstrated decreased Activity tolerance, Balance, ROM, Safety and Strength as well as decreased independence with Ambulation, Bed Mobility  and Transfers.      Recommending SNF upon discharge as patient functioning well below baseline, demonstrates good rehab potential and unable to return home due to limited or no family support, inability to negotiate stairs to enter home/bedroom/bathroom, burden of care beyond caregiver ability, home environment not conducive to patient recovery and limited safety awareness. Goals : To be met in 3 visits:  1). Independent with LE Ex x 10 reps    To be met in 6 visits:  1). Supine to/from sit: Max A   2). Sit to/from stand: Max A   3). Bed to chair: Max A   4). Patient will be able to tolerated EOB sitting with Fair  sitting balance and Min A for 10 min. 5). Tolerate B LE exercises 3 sets of 10-15 reps      Rehabilitation Potential: Fair  Strengths for achieving goals include:   Pt cooperative   Barriers to achieving goals include:    impaired cognition, poor safety awareness    Plan    To be seen 3-5 x / week  while in acute care setting for therapeutic exercises, bed mobility, transfers, progressive gait training, balance training, and family/patient education. Signature: Rafia Sargent MS PT, # [id number]      If patient discharges from this facility prior to next visit, this note will serve as the Discharge Summary.

## 2020-12-11 NOTE — PROGRESS NOTES
AM assessment completed, see flow sheet. Pt is alert and oriented to self. Minimal verbal responses. Vital signs are WNL. Respirations are even & easy on 4L/NC r/t ICU RN Esther Coreas stated desaturated with transfer and pt typically on 3L/O2 at home. . No complaints voiced. Pt denies needs at this time. SR up x 2, and bed in low position. Call light is within reach.

## 2020-12-11 NOTE — PROGRESS NOTES
IM Progress Note    Admit Date:  12/1/2020  10    Interval history:  Admitted with covid 19 infection , resp failure  Emergently intubated in ER   Given 1 unit PRBC for anemia     76/6- brief asystolic for 30 sec during nursing care, had CPR for few sec and no meds, ROSC      12/5- no acute events , failed SBT    12/6- pt remains on vent, tolerated SBT few min    12/7   Could not tolerate SBT  Started on precedex       12/8   On SBT   on precedex     12/9   Failed SBT today    12/10  Now on SBT for an hour r so  Doing well. On precedex    12/11   Extubated yesterday   now on RA        Objective:   /86   Pulse 108   Temp 98.3 °F (36.8 °C) (Bladder)   Resp 20   Ht 5' 2\" (1.575 m)   Wt 88 lb 3.2 oz (40 kg)   SpO2 97%   BMI 16.13 kg/m²       Intake/Output Summary (Last 24 hours) at 12/11/2020 0919  Last data filed at 12/11/2020 0631  Gross per 24 hour   Intake 60 ml   Output 2375 ml   Net -2315 ml       Physical Exam:       General: thin cachetic appearing elderly female on vent  Awake,alert  Mucous Membranes:  Pink , anicteric  Neck: No JVD, no carotid bruit, no thyromegaly  Chest:  Clear to auscultation bilaterally  Cardiovascular:  RRR S1S2 heard, no murmurs or gallops  Abdomen:  Soft, undistended, non tender, no organomegaly, BS present  Extremities:  Severe muscle wasting of all ext  No edema or cyanosis.  Distal pulses well felt  Neurological : awake,disoriented      Medications:   Scheduled Medications:    dexamethasone  4 mg Intravenous Daily    sodium chloride flush  10 mL Intravenous 2 times per day    sodium chloride  20 mL Intravenous Once    pantoprazole  40 mg Intravenous BID    folic acid  1 mg Oral Daily     I   dextrose       ipratropium-albuterol, sodium chloride, sodium chloride flush, acetaminophen **OR** acetaminophen, polyethylene glycol, promethazine **OR** ondansetron, glucose, dextrose, glucagon (rDNA), dextrose, potassium chloride **OR** potassium alternative oral replacement **OR** potassium chloride, magnesium sulfate    Lab Data:  Recent Labs     12/09/20  0429 12/10/20  0400 12/11/20  0519   WBC 5.0 4.4 7.8   HGB 7.1* 6.9* 12.3   HCT 23.1* 22.6* 38.3   MCV 69.5* 71.4* 77.9*    205 243     Recent Labs     12/09/20  0429 12/10/20  0400 12/11/20  0519    138 141   K 3.9 3.5 4.0    103 101   CO2 31 31 33*   BUN 25* 20 14   CREATININE <0.5* <0.5* <0.5*     No results for input(s): CKTOTAL, CKMB, CKMBINDEX, TROPONINI in the last 72 hours. Coagulation:   Lab Results   Component Value Date    INR 0.88 03/04/2018    APTT 36.9 03/04/2018     Cardiac markers:   Lab Results   Component Value Date    CKTOTAL 121 06/01/2017    TROPONINI <0.01 12/04/2020         Lab Results   Component Value Date    ALT 12 12/11/2020    AST 19 12/11/2020    ALKPHOS 46 12/11/2020    BILITOT 0.5 12/11/2020       Lab Results   Component Value Date    INR 0.88 03/04/2018    INR 0.86 06/01/2017    INR 0.90 10/07/2013    PROTIME 10.0 03/04/2018    PROTIME 10.0 06/01/2017    PROTIME 10.1 10/07/2013       Radiology    EKG:  I have reviewed the EKG with the following interpretation:   Sinus tachycardia with ventricular rate 111 bpm     RADIOLOGY  XR CHEST PORTABLE   Final Result   Technically limited study performed portably with arms overlying the lower   chest.  Asymmetric ground-glass opacification appears present in the right   lower lung zone which may represent edema or developing pneumonitis.          cxr    Stable appropriate positions of support apparatus. 2. Stable multifocal pneumonia.             Ct head    No acute intracranial finding.  MRI may be obtained if clinically indicated.           cxr    Stable lines and catheters.         Persistent multifocal interstitial opacities most pronounced both lung bases. Slight improved aeration of the upper and mid lungs.       covid positive 12/1         ASSESSMENT/PLAN:     #Acute hypoxic and hypercarbic respiratory failure  - 2/2 COVID 23  - She was intubated by ER provider, mechanical ventilation to be managed by pulmonary  - minimal support on vent, ongoing weaning trials per pulm. gets very agitated and anxious during trials. Started on Precedex  Extubated 12/10  Now on RA      #COVID 19 Pneumonia  -Pulmonary consulted  -IV Decadron day #10- being tapered -s/premdesivir D5 and s/p convalescent plasma on 12/1  -completed Rocephin and Zithromax 5/5  - imaging with stable  ASD   - contact plus isolation        #Sepsis  - tachycardia, tachypnea, leucocytosis  -Secondary to COVID-19 pneumonia  -Antibiotics as above  -Follow-up cultures  -IV fluids given and improved  blood pressure    Brief Asystole with cpr requirement on 12/3 , during nursing care  - no meds given, ROSC with in few sec  - stable since      #Acute metabolic encephalopathy  -Suspect related to hypercarbia, sepsis, acute infection  -She has a nonfocal neurologic exam, she is moving all extremities. She does have a deformed left upper extremity secondary to a distal humerus fracture which was never repaired. -Management of sepsis, respiratory failure and infection as above  -Checked CT head - neg    -Checked TSH (normal),  Low folic acid , replace  -Checked ethanol level- neg  Continues to be disoriented       #Hyperkalemia-> hypokalemia   - ? Hemolyzed specimen  -Received IV NS, calcium gluconate and IV insulin-partial doses- per ER nurse. Repeat potassium is normalized, then low- replacement ordered         # Acute blood loss anemia, on chronic anemia  History of peptic ulcer disease  -Gastric ulcer noted on EGD in 2013  -Start IV PPI bid , h.h dropped from 8.7 to 6.5, partly with IV fluid boluses   - s.p 1 unit pRBC   - iron studies with folate and iron def, started supplements   Hg 6.9  - transfused 2  unit of red cells today  Hb 12.3 ?  today     #Left distal humerus fracture 2018  -Chronic deformity of the left upper extremity     #Reported history of alcohol abuse  -Unclear if she is still consuming alcohol. Checked an ethanol level- negative     #Severe PCM  - extreme muscle wasting.   Will need dietitian eval when extubated  - checked TSH wnl     DVT Prophylaxis: Lovenox holding for anemia   Diet: started TF  Code Status: Full Code     DISPO: ICU care      Caroline Loyd MD 12/11/2020 9:19 AM

## 2020-12-11 NOTE — PROGRESS NOTES
08:30 Pt awake a&o  To self only, bed alarm on for safety, assessment as charted  10:20 Handoff report given to  [facility] [address] [place] [address].  Pt stable @ time of transfer

## 2020-12-11 NOTE — PROGRESS NOTES
4 Eyes Skin Assessment     The patient is being assess for   Shift Handoff  Given to: Janette MARIE RN  I agree that 2 RN's have performed a thorough Head to Toe Skin Assessment on the patient. ALL assessment sites listed below have been assessed. Areas assessed by both nurses:   [x]   Head, Face, and Ears   [x]   Shoulders, Back, and Chest, Abdomen  [x]   Arms, Elbows, and Hands   [x]   Coccyx, Sacrum, and Ischium  [x]   Legs, Feet, and Heels        Preventive mepilex to bony prominences. EHOB boots ble for heel protection    **SHARE this note so that the co-signing nurse is able to place an eSignature**    Co-signer eSignature: Electronically signed by Antonio Calvo RN on 12/11/20 at 11:44 AM EST    Does the Patient have Skin Breakdown?   No          Yogi Prevention initiated:  Yes   Wound Care Orders initiated:  Yes      [id number] nurse consulted for Pressure Injury (Stage 3,4, Unstageable, DTI, NWPT, Complex wounds)and New or Established Ostomies:  No      Primary Nurse eSignature: Electronically signed by Dwight Kyle RN on 12/11/20 at 6:57 AM EST

## 2020-12-11 NOTE — CARE COORDINATION
INTERDISCIPLINARY PLAN OF CARE CONFERENCE    Date/Time: 12/11/2020 10:36 AM  Completed by: Dennis Lira, Case Management      Patient Name:  Nikky Thomson  YOB: 1950  Admitting Diagnosis: Failure to thrive in adult [R62.7]  Acute respiratory failure with hypoxia ([facility] [address] [J96.01]     Admit Date/Time:  12/1/2020  5:50 AM    Chart reviewed. Interdisciplinary team contacted or reviewed plan related to patient progress and discharge plans. Disciplines included Case Management, Nursing, and Dietitian. Current Status:ICU, labs, vs  PT/OT recommendation for discharge plan of care: evals pending    Expected D/C Disposition:  Home vs STR    Discharge Plan Comments: Pt cont in ICU. Extubated 12/10 and now on O2 at 3lpm. PT/OT evals pending. Plan for home vs STR. Will follow.     Home O2 in place on admit: No  Pt informed of need to bring portable home O2 tank on day of discharge for nursing to connect prior to leaving:  Not Indicated  Verbalized agreement/Understanding:  Not Indicated

## 2020-12-11 NOTE — PLAN OF CARE
Nutrition Problem #1: Severe malnutrition  Intervention: Food and/or Nutrient Delivery: Continue Current Diet  Nutritional Goals: patient will accept and consume at least 50% of her meals on general diet order + pureed solid consistency and mildly thick liquid consistency x 3 meals per day without difficulty chewing and/or swallowing safely

## 2020-12-11 NOTE — PROGRESS NOTES
12/10/20  0400   AST 16 16 14*   ALT 9* 9* 9*   BILITOT <0.2 <0.2 <0.2   ALKPHOS 35* 37* 34*       Imaging Last 24 Hours:  Xr Chest Portable    Result Date: 12/10/2020  EXAMINATION: ONE XRAY VIEW OF THE CHEST 12/10/2020 6:23 am COMPARISON: December 9, 2020 HISTORY: ORDERING SYSTEM PROVIDED HISTORY: Daily while on vent TECHNOLOGIST PROVIDED HISTORY: Reason for exam:->Daily while on vent Reason for Exam: respitory failure Acuity: Acute Type of Exam: Initial FINDINGS: Endotracheal tube right-sided PICC line and nasogastric tube remain in adequate position. No significant change of bilateral pulmonary disease slightly greater on the right. Trace amount of pleural fluid also without significant change. No pneumothorax. No significant change in appearance of the chest over the past 24 hours with persistent diffuse bilateral pulmonary disease and trace pleural fluid. There also is diffuse bowel dilation suggesting ileus     Xr Chest Portable    Result Date: 12/9/2020  EXAMINATION: ONE XRAY VIEW OF THE CHEST 12/9/2020 6:40 am COMPARISON: 12/08/2020 HISTORY: ORDERING SYSTEM PROVIDED HISTORY: Daily while on vent TECHNOLOGIST PROVIDED HISTORY: Reason for exam:->Daily while on vent Reason for Exam: mv Acuity: Acute Type of Exam: Initial FINDINGS: Endotracheal tube unchanged. Right-sided PICC line remains in place. Heart size stable. Lungs hyperinflated.   Persistent strandy opacities in the bases with minimal blunting of the costophrenic angles     Essentially stable exam with probable pulmonary emphysema, and suspected small pleural effusions with bibasilar atelectasis     Assessment//Plan           Hospital Problems           Last Modified POA    BELKYS (acute kidney injury) ([facility] [address] 12/1/2020 Yes    Failure to thrive 12/1/2020 Yes    Acute metabolic encephalopathy 80/5/0444 Yes    Pneumonia due to COVID-19 virus 12/1/2020 Yes    Acute respiratory failure with hypoxemia ([address] 12/1/2020 Yes    [facility] 12/1/2020 Yes    Severe protein-calorie malnutrition [facility] 12/2/2020 Yes    Anorexia 12/2/2020 Yes        Assessment & Plan    Electronically signed by Mey Aguillon RN on 12/10/20 at 10:38 PM EST

## 2020-12-11 NOTE — PROGRESS NOTES
Inpatient Occupational Therapy  Evaluation and Treatment    Unit: [address] [place]  Date:  12/11/2020  Patient Name:    Camille Anne  Admitting diagnosis:  Failure to thrive in adult [R62.7]  Acute respiratory failure with hypoxia ([facility] [address] [J96.01]  Admit Date:  12/1/2020  Precautions/Restrictions/WB Status/ Lines/ Wounds/ Oxygen: fall risk, sandoval catheter , supplemental O2 (1.5L), confusion, telemetry, continuous pulse ox and Droplet Plus precautions (+ COVID 19)    Treatment Time: 1612- 9500  Treatment Number: 1     Billable Treatment Time: 38 minutes   Total Treatment Time:   48   minutes    Patient Goals for Therapy:  \" sit up at EOB \"      Discharge Recommendations: SNF  DME needs for discharge: defer to facility       Therapy recommendations for staff:   Assist of 2 bed mobility    History of Present Illness: per H&P70 y.o. female with reported history of dementia, reported history of alcohol abuse, prior admission to hospital for FTT (2013), gastric ulcer (2013) who presented to [facility] ED with complaint of \"failure to thrive\". Patient is awake on my exam, she will tell me her name but she cannot tell me where she is or the month of the year. She cannot provide any history due to her mentation. ER provider spoke with the patient's son who stated she stopped eating three for 4 days ago and would only take little sips of water. Family sent her to the ER for further evaluation due to this.     In the ER she was afebrile, borderline low blood pressure, hypoxic on room air requiring 4 L of nasal cannula oxygen. She does not use home oxygen. Her rapid Covid test is positive. Chest x-ray showed asymmetric groundglass opacifications in the right lower lung.   Initial potassium was 6.2 (?hemolyzed), her nurse tells me she received partial doses of insulin and calcium gluconate as well as IV fluids, repeat potassium level 3.8.     When I saw her in the emergency department she was lethargic, continually removing her oxygen with subsequent desaturations to the 70s. When wearing 4 L nasal cannula her oxygen saturation was stable. I later discussed with ER nurse, she was subsequently intubated for inability to be compliant with nasal cannula oxygen. She will be admitted to the ICU  Extubated 12/10    Home Health S4 Level Recommendation:  NA  AM-PAC Score: AM-PAC Inpatient Daily Activity Raw Score: 9    Preadmission Environment  Poor historian, unclear of accuracy of PTA hx from patient below  Pt. Lives with family (adult son)  Home environment:  one story home? Patient denies needing AD for ambulation prior to admission or assistance for ADLs      Preadmission Status / PLOF:  History of falls   Unknown  Pt. Able to drive   Unknown  Pt Fully independent with ADL's  Unknown  Pt. Required assistance from family for:  unknown    Pt. Fully independent for transfers and gait and walked with: Unknown    Pain  No  Rating:NA  Location:  Pain Medicine Status: No request made      Cognition    A&O Person   Able to follow 1 step commands inconsistently    Subjective  Patient lying supine in bed with no family present - no visitors present due to COVID-19 restrictions  Pt agreeable to this OT eval & tx. Upper Extremity ROM:    Impaired R WFLs during bed mobility  Impaired L unable to perform shld flexion due to hx of deformity after humerus fx    Upper Extremity Strength:    BUE strength impaired but not formally assessed w/ MMT    Upper Extremity Sensation    NT    Upper Extremity Proprioception:  NT    Coordination and Tone  NT    Balance  Functional Sitting Balance:  Impaired max assist at EOB leaning towards the right side  Functional Standing Balance:NT    Bed mobility:  Patient needing cues for motor planning to sit at EOB  Supine to sit: Max A of 2  Sit to supine:   Max A of 2  Rolling: Max A of 2  Scooting in sitting:   Total A of 2  Scooting to head of bed:   Total A of 2    Bridging:   Not Tested    Transfers:    Sit to stand:  Not Tested  Stand to sit:  Not Tested  Bed to chair:   Not Tested  Standard toilet: Not Tested  Bed to [facility]:  Not Tested    Dressing:      UE:   Not Tested  LE:    Total A     Bathing:    UE:  Not Tested  LE:  Not Tested    Eating:   Not Tested    Toileting:  Not Tested    Activity Tolerance   Pt completed therapy session with SOB noted with lying flat to adjust position  SpO2: 92% on 1.5 L   HR: 113   BP: 180/108 leg, 169/94 left lower arm    Positioning Needs: In bed, partial sidelying with pillows to decrease pressure to arleth prominences. Call light and needs in reach. Alarm Set    Exercise / Activities Initiated:   N/A    Patient/Family Education:   Role of OT    Assessment of Deficits: Pt seen for Occupational therapy evaluation in acute care setting. Pt demonstrated decreased Activity tolerance, ADLs, Balance , Bed mobility, ROM, Safety Awareness, Strength, Transfers, Cognition and Coping Skills. Pt functioning below baseline and will likely benefit from skilled occupational therapy services to maximize safety and independence. Goal(s) : To be met in 3 Visits:  1). Bed to toilet/BSC: Assess at next visit as able    To be met in 5 Visits:  1). Supine to/from Sit:  Max A  2). Upper Body Bathing:   Max A  3). Lower Body Bathing:   Max A  4). Upper Body Dressing:  Max A  5). Lower Body Dressing:  Max A  6). Pt to demonstrate UE exs x 15 reps with minimal cues    Rehabilitation Potential:  Fair for goals listed above. Strengths for achieving goals include: Family Support  Barriers to achieving goals include:  Complexity of condition, Weakness and Cognition     Plan: To be seen 3-5 x/wk while in acute care setting for therapeutic exercises, bed mobility, transfers, dressing, bathing, family/patient education, ADL/IADL retraining, energy conservation training.      Erica Patterson, OTR/L 6059    If patient discharges from this facility prior to next visit, this note will serve as the Discharge Summary

## 2020-12-11 NOTE — PROGRESS NOTES
Writer called due to de-saturation, 79%  With a good pleth. Upon entering the room, pt. Pale, warm and dry and writer noticed the o2 resting on her forehead. o2 at 2l n/c. Oxygen replaced in her nostrils with continual observance. o2 sat increased to 91%. Shift RN updated in person, within the room.  [facility] 46

## 2020-12-11 NOTE — PROGRESS NOTES
Pulmonary & Critical Care Medicine ICU Progress Note      CC: COVID-19, hypoxemia, AMS    Events of Last 24 hours:    Extubated  Speech recommended nectar thick liquids  2 units PRBC    Invasive Lines:   12/4/2020 PICC    MV: 12/1/2020-12/10/2020  Vent Mode: AC/VC Rate Set: 18 bmp/Vt Ordered: 350 mL/ /FiO2 : 30 %  Recent Labs     12/09/20  0410 12/10/20  0400   PHART 7.448 7.452*   [id number] 44.2 38.6   PO2ART 75.6 107.6       IV:   dextrose         EXAM:  BP (!) 150/92   Pulse 114   Temp 98 °F (36.7 °C) (Bladder)   Resp 24   Ht 5' 2\" (1.575 m)   Wt 88 lb 3.2 oz (40 kg)   SpO2 96%   BMI 16.13 kg/m²  on room air    Intake/Output Summary (Last 24 hours) at 12/11/2020 0606  Last data filed at 12/11/2020 0538  Gross per 24 hour   Intake 289 ml   Output 1950 ml   Net -1661 ml     General: ill appearing. Cachectic  Eyes: PERRL. No sclera icterus. No conjunctival injection. ENT: No discharge. Pharynx clear. Neck: Trachea midline. Normal thyroid. Resp: No accessory muscle use. No crackles. No wheezing. No rhonchi. No dullness on percussion. Very diminished  CV: Regular rate. Regular rhythm. No mumur or rub. No edema. Legs are pruned. Peripheral pulses are 2+. Capillary refill is less than 3 seconds. GI: Non-tender. Non-distended. No masses. No organomegaly. Normal bowel sounds. No hernia. Skin: Warm and dry. No nodule on exposed extremities. No rash on exposed extremities. Lymph: No cervical LAD. No supraclavicular LAD. M/S: No cyanosis. No joint deformity. No clubbing. Neuro: Alert and oriented to person and events. Patellar reflexes are symmetric. Psych: No agitation, no anxiety, affect is full.      Medications:   dexamethasone  4 mg Intravenous Daily    sodium chloride flush  10 mL Intravenous 2 times per day    sodium chloride  20 mL Intravenous Once    pantoprazole  40 mg Intravenous BID    folic acid  1 mg Oral Daily     PRN Meds:  ipratropium-albuterol, sodium chloride, sodium chloride flush, acetaminophen **OR** acetaminophen, polyethylene glycol, promethazine **OR** ondansetron, glucose, dextrose, glucagon (rDNA), dextrose, potassium chloride **OR** potassium alternative oral replacement **OR** potassium chloride, magnesium sulfate    Results:  CBC:   Recent Labs     12/09/20  0429 12/10/20  0400 12/11/20  0519   WBC 5.0 4.4 7.8   HGB 7.1* 6.9* 12.3   HCT 23.1* 22.6* 38.3   MCV 69.5* 71.4* 77.9*    205 243     BMP:   Recent Labs     12/09/20  0429 12/10/20  0400 12/11/20  0519    138 141   K 3.9 3.5 4.0    103 101   CO2 31 31 33*   BUN 25* 20 14   CREATININE <0.5* <0.5* <0.5*     LIVER PROFILE:   Recent Labs     12/09/20  0429 12/10/20  0400 12/11/20  0519   AST 16 14* 19   ALT 9* 9* 12   BILITOT <0.2 <0.2 0.5   ALKPHOS 37* 34* 46     PT/INR: No results for input(s): PROTIME, INR in the last 72 hours. APTT: No results for input(s): APTT in the last 72 hours. UA:  No results for input(s): NITRITE, COLORU, PHUR, LABCAST, WBCUA, RBCUA, MUCUS, TRICHOMONAS, YEAST, BACTERIA, CLARITYU, SPECGRAV, LEUKOCYTESUR, UROBILINOGEN, BILIRUBINUR, BLOODU, GLUCOSEU, AMORPHOUS in the last 72 hours.     Invalid input(s): Cristian Seller    Cultures:  12/1/2020 SARS-CoV-2 positive  12/2/2020 respiratory culture NRF  12/2/2020 urine no growth    Films:  CXR 12/8/2020 ETT okay     ASSESSMENT:  · Acute hypoxemic respiratory failure   · COVID-19 pneumonia  · Cachexia/failure to thrive  · Acute metabolic encephalopathy superimposed on underlying dementia  · Acute anemia -S/P 2 units PRBC  · Possible brief asystolic arrest early am 44/4/56 with unclear etiology - 30 seconds before ROSC     PLAN:  · D#10 Decadron 6 mg daily -now taper- FSBS monitoring   · Completed 5 days Remdesevir; s/p 1 U CCP 12/3/2020  · Completed 7 days ceftriaxone and 5 days azithromycin  · S/P 3 U PRBC total  · ICU care -SCD, hold Lovenox and follow hemoglobin; on BID protonix  · FC  · NOK Ruthy Liranicko (son)

## 2020-12-11 NOTE — PROGRESS NOTES
Risk for Ineffective Airway Clearance Progress Note    Data:  Patient now decannulated on oxygen 2 lpm via nasal cannula. Observed no sign of respiratory distress. Oxygen saturation 96% time. Observed patient removed nasal cannula; resulting in oxygen saturation 85% on RA. Action:  Increased oxygen via Nasal cannula to 3 lpm.  Educated patient on purpose and benefits of leaving oxygen/ nasal cannula in place. Response:  Patient verbalized understanding and agreed to comply. O2 saturation at this time 98%.

## 2020-12-12 ENCOUNTER — APPOINTMENT (OUTPATIENT)
Dept: CT IMAGING | Age: 70
DRG: 870 | End: 2020-12-12
Payer: MEDICARE

## 2020-12-12 ENCOUNTER — APPOINTMENT (OUTPATIENT)
Dept: GENERAL RADIOLOGY | Age: 70
DRG: 870 | End: 2020-12-12
Payer: MEDICARE

## 2020-12-12 LAB
ANION GAP SERPL CALCULATED.3IONS-SCNC: 7 MMOL/L (ref 3–16)
APTT: 35.3 SEC (ref 24.2–36.2)
BASE EXCESS ARTERIAL: 2.7 MMOL/L (ref -3–3)
BASE EXCESS ARTERIAL: 4.1 MMOL/L (ref -3–3)
BASE EXCESS ARTERIAL: 7.7 MMOL/L (ref -3–3)
BASOPHILS ABSOLUTE: 0.1 K/UL (ref 0–0.2)
BASOPHILS RELATIVE PERCENT: 0.8 %
BUN BLDV-MCNC: 17 MG/DL (ref 7–20)
CALCIUM SERPL-MCNC: 9.2 MG/DL (ref 8.3–10.6)
CARBOXYHEMOGLOBIN ARTERIAL: 0.3 % (ref 0–1.5)
CARBOXYHEMOGLOBIN ARTERIAL: 0.8 % (ref 0–1.5)
CARBOXYHEMOGLOBIN ARTERIAL: 1.1 % (ref 0–1.5)
CHLORIDE BLD-SCNC: 96 MMOL/L (ref 99–110)
CO2: 31 MMOL/L (ref 21–32)
CREAT SERPL-MCNC: <0.5 MG/DL (ref 0.6–1.2)
EKG ATRIAL RATE: 134 BPM
EKG DIAGNOSIS: NORMAL
EKG P AXIS: 84 DEGREES
EKG P-R INTERVAL: 112 MS
EKG Q-T INTERVAL: 252 MS
EKG QRS DURATION: 72 MS
EKG QTC CALCULATION (BAZETT): 376 MS
EKG R AXIS: 81 DEGREES
EKG T AXIS: -48 DEGREES
EKG VENTRICULAR RATE: 134 BPM
EOSINOPHILS ABSOLUTE: 0.1 K/UL (ref 0–0.6)
EOSINOPHILS RELATIVE PERCENT: 0.6 %
GFR AFRICAN AMERICAN: >60
GFR NON-AFRICAN AMERICAN: >60
GLUCOSE BLD-MCNC: 102 MG/DL (ref 70–99)
GLUCOSE BLD-MCNC: 108 MG/DL (ref 70–99)
GLUCOSE BLD-MCNC: 112 MG/DL (ref 70–99)
GLUCOSE BLD-MCNC: 90 MG/DL (ref 70–99)
HCO3 ARTERIAL: 29.4 MMOL/L (ref 21–29)
HCO3 ARTERIAL: 34.8 MMOL/L (ref 21–29)
HCO3 ARTERIAL: 36.1 MMOL/L (ref 21–29)
HCT VFR BLD CALC: 41.3 % (ref 36–48)
HEMOGLOBIN, ART, EXTENDED: 12.4 G/DL (ref 12–16)
HEMOGLOBIN, ART, EXTENDED: 14.3 G/DL (ref 12–16)
HEMOGLOBIN, ART, EXTENDED: 14.6 G/DL (ref 12–16)
HEMOGLOBIN: 13.3 G/DL (ref 12–16)
LACTIC ACID, SEPSIS: 0.5 MMOL/L (ref 0.4–1.9)
LACTIC ACID: 0.5 MMOL/L (ref 0.4–2)
LYMPHOCYTES ABSOLUTE: 0.6 K/UL (ref 1–5.1)
LYMPHOCYTES RELATIVE PERCENT: 5.9 %
MCH RBC QN AUTO: 25.1 PG (ref 26–34)
MCHC RBC AUTO-ENTMCNC: 32.2 G/DL (ref 31–36)
MCV RBC AUTO: 77.8 FL (ref 80–100)
METHEMOGLOBIN ARTERIAL: 0.1 %
METHEMOGLOBIN ARTERIAL: 0.3 %
METHEMOGLOBIN ARTERIAL: 0.3 %
MONOCYTES ABSOLUTE: 0.8 K/UL (ref 0–1.3)
MONOCYTES RELATIVE PERCENT: 7.6 %
NEUTROPHILS ABSOLUTE: 9.3 K/UL (ref 1.7–7.7)
NEUTROPHILS RELATIVE PERCENT: 85.1 %
O2 CONTENT ARTERIAL: 16 ML/DL
O2 CONTENT ARTERIAL: 17 ML/DL
O2 CONTENT ARTERIAL: 19 ML/DL
O2 SAT, ARTERIAL: 79.5 %
O2 SAT, ARTERIAL: 90.7 %
O2 SAT, ARTERIAL: 98.9 %
O2 THERAPY: ABNORMAL
PCO2 ARTERIAL: 47.1 MMHG (ref 35–45)
PCO2 ARTERIAL: 68.4 MMHG (ref 35–45)
PCO2 ARTERIAL: 97.3 MMHG (ref 35–45)
PDW BLD-RTO: 30.4 % (ref 12.4–15.4)
PERFORMED ON: ABNORMAL
PERFORMED ON: ABNORMAL
PERFORMED ON: NORMAL
PH ARTERIAL: 7.17 (ref 7.35–7.45)
PH ARTERIAL: 7.34 (ref 7.35–7.45)
PH ARTERIAL: 7.41 (ref 7.35–7.45)
PLATELET # BLD: 273 K/UL (ref 135–450)
PMV BLD AUTO: 8.6 FL (ref 5–10.5)
PO2 ARTERIAL: 150.6 MMHG (ref 75–108)
PO2 ARTERIAL: 47.6 MMHG (ref 75–108)
PO2 ARTERIAL: 76.7 MMHG (ref 75–108)
POTASSIUM SERPL-SCNC: 4.5 MMOL/L (ref 3.5–5.1)
PROCALCITONIN: 0.28 NG/ML (ref 0–0.15)
RBC # BLD: 5.31 M/UL (ref 4–5.2)
SODIUM BLD-SCNC: 134 MMOL/L (ref 136–145)
TCO2 ARTERIAL: 30.8 MMOL/L
TCO2 ARTERIAL: 37.8 MMOL/L
TCO2 ARTERIAL: 38.2 MMOL/L
WBC # BLD: 10.9 K/UL (ref 4–11)

## 2020-12-12 PROCEDURE — 2700000000 HC OXYGEN THERAPY PER DAY

## 2020-12-12 PROCEDURE — 0BH18EZ INSERTION OF ENDOTRACHEAL AIRWAY INTO TRACHEA, VIA NATURAL OR ARTIFICIAL OPENING ENDOSCOPIC: ICD-10-PCS | Performed by: INTERNAL MEDICINE

## 2020-12-12 PROCEDURE — 6370000000 HC RX 637 (ALT 250 FOR IP): Performed by: INTERNAL MEDICINE

## 2020-12-12 PROCEDURE — 5A1945Z RESPIRATORY VENTILATION, 24-96 CONSECUTIVE HOURS: ICD-10-PCS | Performed by: INTERNAL MEDICINE

## 2020-12-12 PROCEDURE — 71045 X-RAY EXAM CHEST 1 VIEW: CPT

## 2020-12-12 PROCEDURE — 6360000002 HC RX W HCPCS: Performed by: INTERNAL MEDICINE

## 2020-12-12 PROCEDURE — 6360000002 HC RX W HCPCS

## 2020-12-12 PROCEDURE — 99291 CRITICAL CARE FIRST HOUR: CPT | Performed by: INTERNAL MEDICINE

## 2020-12-12 PROCEDURE — 85025 COMPLETE CBC W/AUTO DIFF WBC: CPT

## 2020-12-12 PROCEDURE — 82803 BLOOD GASES ANY COMBINATION: CPT

## 2020-12-12 PROCEDURE — 71260 CT THORAX DX C+: CPT

## 2020-12-12 PROCEDURE — 94003 VENT MGMT INPAT SUBQ DAY: CPT

## 2020-12-12 PROCEDURE — 31500 INSERT EMERGENCY AIRWAY: CPT

## 2020-12-12 PROCEDURE — C9113 INJ PANTOPRAZOLE SODIUM, VIA: HCPCS | Performed by: INTERNAL MEDICINE

## 2020-12-12 PROCEDURE — 2580000003 HC RX 258: Performed by: INTERNAL MEDICINE

## 2020-12-12 PROCEDURE — 2000000000 HC ICU R&B

## 2020-12-12 PROCEDURE — 93005 ELECTROCARDIOGRAM TRACING: CPT | Performed by: INTERNAL MEDICINE

## 2020-12-12 PROCEDURE — 85730 THROMBOPLASTIN TIME PARTIAL: CPT

## 2020-12-12 PROCEDURE — 84145 PROCALCITONIN (PCT): CPT

## 2020-12-12 PROCEDURE — 36592 COLLECT BLOOD FROM PICC: CPT

## 2020-12-12 PROCEDURE — 93010 ELECTROCARDIOGRAM REPORT: CPT | Performed by: INTERNAL MEDICINE

## 2020-12-12 PROCEDURE — 36415 COLL VENOUS BLD VENIPUNCTURE: CPT

## 2020-12-12 PROCEDURE — 80048 BASIC METABOLIC PNL TOTAL CA: CPT

## 2020-12-12 PROCEDURE — 94761 N-INVAS EAR/PLS OXIMETRY MLT: CPT

## 2020-12-12 PROCEDURE — 83605 ASSAY OF LACTIC ACID: CPT

## 2020-12-12 RX ORDER — PROPOFOL 10 MG/ML
10 INJECTION, EMULSION INTRAVENOUS CONTINUOUS
Status: DISCONTINUED | OUTPATIENT
Start: 2020-12-12 | End: 2020-12-15

## 2020-12-12 RX ORDER — MIDAZOLAM HYDROCHLORIDE 1 MG/ML
2 INJECTION INTRAMUSCULAR; INTRAVENOUS
Status: DISCONTINUED | OUTPATIENT
Start: 2020-12-12 | End: 2020-12-23 | Stop reason: HOSPADM

## 2020-12-12 RX ORDER — CARBOXYMETHYLCELLULOSE SODIUM 10 MG/ML
1 GEL OPHTHALMIC EVERY 4 HOURS
Status: DISCONTINUED | OUTPATIENT
Start: 2020-12-12 | End: 2020-12-14

## 2020-12-12 RX ORDER — DEXAMETHASONE SODIUM PHOSPHATE 10 MG/ML
4 INJECTION, SOLUTION INTRAMUSCULAR; INTRAVENOUS DAILY
Status: DISCONTINUED | OUTPATIENT
Start: 2020-12-12 | End: 2020-12-13

## 2020-12-12 RX ORDER — MINERAL OIL AND WHITE PETROLATUM 150; 830 MG/G; MG/G
OINTMENT OPHTHALMIC EVERY 4 HOURS
Status: DISCONTINUED | OUTPATIENT
Start: 2020-12-12 | End: 2020-12-14

## 2020-12-12 RX ORDER — ALBUTEROL SULFATE 90 UG/1
4 AEROSOL, METERED RESPIRATORY (INHALATION) EVERY 4 HOURS PRN
Status: DISCONTINUED | OUTPATIENT
Start: 2020-12-12 | End: 2020-12-15

## 2020-12-12 RX ORDER — CHLORHEXIDINE GLUCONATE 0.12 MG/ML
15 RINSE ORAL 2 TIMES DAILY
Status: DISCONTINUED | OUTPATIENT
Start: 2020-12-12 | End: 2020-12-15

## 2020-12-12 RX ORDER — FUROSEMIDE 10 MG/ML
20 INJECTION INTRAMUSCULAR; INTRAVENOUS 2 TIMES DAILY
Status: DISCONTINUED | OUTPATIENT
Start: 2020-12-12 | End: 2020-12-12

## 2020-12-12 RX ORDER — LORAZEPAM 2 MG/ML
INJECTION INTRAMUSCULAR
Status: COMPLETED
Start: 2020-12-12 | End: 2020-12-12

## 2020-12-12 RX ORDER — 0.9 % SODIUM CHLORIDE 0.9 %
30 INTRAVENOUS SOLUTION INTRAVENOUS ONCE
Status: COMPLETED | OUTPATIENT
Start: 2020-12-12 | End: 2020-12-12

## 2020-12-12 RX ORDER — PANTOPRAZOLE SODIUM 40 MG/10ML
40 INJECTION, POWDER, LYOPHILIZED, FOR SOLUTION INTRAVENOUS DAILY
Status: DISCONTINUED | OUTPATIENT
Start: 2020-12-12 | End: 2020-12-23 | Stop reason: HOSPADM

## 2020-12-12 RX ORDER — FENTANYL CITRATE 50 UG/ML
50 INJECTION, SOLUTION INTRAMUSCULAR; INTRAVENOUS
Status: DISCONTINUED | OUTPATIENT
Start: 2020-12-12 | End: 2020-12-15

## 2020-12-12 RX ADMIN — PANTOPRAZOLE SODIUM 40 MG: 40 INJECTION, POWDER, FOR SOLUTION INTRAVENOUS at 15:42

## 2020-12-12 RX ADMIN — WHITE PETROLATUM 57.7 %-MINERAL OIL 31.9 % EYE OINTMENT: at 20:46

## 2020-12-12 RX ADMIN — Medication 10 ML: at 20:40

## 2020-12-12 RX ADMIN — CARBOXYMETHYLCELLULOSE SODIUM 1 DROP: 10 GEL OPHTHALMIC at 22:29

## 2020-12-12 RX ADMIN — PANTOPRAZOLE SODIUM 40 MG: 40 TABLET, DELAYED RELEASE ORAL at 05:37

## 2020-12-12 RX ADMIN — DEXAMETHASONE SODIUM PHOSPHATE 4 MG: 10 INJECTION, SOLUTION INTRAMUSCULAR; INTRAVENOUS at 15:42

## 2020-12-12 RX ADMIN — LORAZEPAM 1 MG: 2 INJECTION INTRAMUSCULAR; INTRAVENOUS at 12:59

## 2020-12-12 RX ADMIN — Medication 15 ML: at 20:47

## 2020-12-12 RX ADMIN — WHITE PETROLATUM 57.7 %-MINERAL OIL 31.9 % EYE OINTMENT: at 15:54

## 2020-12-12 RX ADMIN — CARBOXYMETHYLCELLULOSE SODIUM 1 DROP: 10 GEL OPHTHALMIC at 15:54

## 2020-12-12 RX ADMIN — SODIUM CHLORIDE 1200 ML: 9 INJECTION, SOLUTION INTRAVENOUS at 15:55

## 2020-12-12 RX ADMIN — CARBOXYMETHYLCELLULOSE SODIUM 1 DROP: 10 GEL OPHTHALMIC at 18:08

## 2020-12-12 RX ADMIN — METOPROLOL TARTRATE 12.5 MG: 25 TABLET, FILM COATED ORAL at 03:01

## 2020-12-12 RX ADMIN — PROPOFOL 5 MCG/KG/MIN: 10 INJECTION, EMULSION INTRAVENOUS at 15:17

## 2020-12-12 RX ADMIN — MIDAZOLAM HYDROCHLORIDE 2 MG: 1 INJECTION, SOLUTION INTRAMUSCULAR; INTRAVENOUS at 16:33

## 2020-12-12 ASSESSMENT — PULMONARY FUNCTION TESTS
PIF_VALUE: 37
PIF_VALUE: 27
PIF_VALUE: 27

## 2020-12-12 ASSESSMENT — PAIN SCALES - GENERAL: PAINLEVEL_OUTOF10: 0

## 2020-12-12 NOTE — PROGRESS NOTES
Order for rapid sequence intubation obtained. Patient pre-oxygenated to with bipap to a saturation 100 percent. 20 mg of Etomidate ordered and administered wn2410 hours         Dr. Elvis Olsen inserted a number  7.5 ET tube. The ET tube is secured at 23 cm measured at the patients lip line. Breath sounds are equal bilaterally. There is a positive color change noted in the colorimetric CO2 detector. RT connected the patient to a ventilator. See orders for ventilator orders. OG tube inserted to a depth of 55 cm. Ausculation of air bolus is positive. POST Intubation ORDERS    ABG ordered in 2 hours  Portable Chest x-ray ordered to confirm placement of the patients ET tube and gastric tube. Bilateral wrist restraints ordered and initiated. Pulses present distal to restraints. Propofol drip ordered for sedation. See EMAR and orders. See physician note to follow.  Electronically signed by Yamileth Fink RN on 12/12/2020 at 1:51 PM

## 2020-12-12 NOTE — PROGRESS NOTES
Pt transported to same day surgery as ICU overflow. Dr Dany Iqbal met us at same day surgery to evaluate pt. Report given to [facility].   Yinka Dempsey RN

## 2020-12-12 NOTE — PROGRESS NOTES
Pt brought to [facility] bed 5. Dr. Jovani Abdalla at bedside. STAT EKG ordered. Pt is currently on BiPAP. 20/10 70%.  SpO2 98%

## 2020-12-12 NOTE — PROGRESS NOTES
Report given  to [facility], for transferral of care. Pt resting in bed. Call light in reach.

## 2020-12-12 NOTE — PROGRESS NOTES
Speech Language Pathology  Attempted to see pt to f/u for dysphagia tx. Chart reviewed and events of morning noted with pt requiring increased respiratory care. Spoke with Lesli/RN who indicates pt is going to be transferred off floor and likely intubated. Recommend NPO at this time. Re-consult ST when pt is stable and appropriate for reassessment. Christina Hernandez. Wilder Isaac M.A.  [id number] [facility]  Speech-Language Pathologist

## 2020-12-12 NOTE — PROGRESS NOTES
Pulmonary & Critical Care Medicine ICU Progress Note      CC: COVID-19, hypoxemia, AMS    Events of Last 24 hours:    Left the ICU on 1.5 L. Did well until 945 this morning when she had an acute decompensation requiring 11 L. Then nonrebreather, still 82%. I ordered BiPAP and Ativan to help tolerate the BiPAP. She was transferred to a higher acuity level of care    Invasive Lines:   12/4/2020 PICC    MV: 12/1/2020-12/10/2020  Vent Mode: AC/VC Rate Set: 18 bmp/Vt Ordered: 350 mL/ /FiO2 : 30 %  Recent Labs     12/12/20  0915 12/12/20  1255   PHART 7.340* 7.171*   ETI2TZZ 68.4* 97.3*   PO2ART 47.6* 76.7       IV:   dextrose         EXAM:  BP (!) 157/92   Pulse 129   Temp 98.9 °F (37.2 °C) (Axillary)   Resp 21   Ht 5' 2\" (1.575 m)   Wt 88 lb 3.2 oz (40 kg)   SpO2 97%   BMI 16.13 kg/m²  on room air    Intake/Output Summary (Last 24 hours) at 12/12/2020 1406  Last data filed at 12/12/2020 0752  Gross per 24 hour   Intake 130 ml   Output 1050 ml   Net -920 ml     General:  ill appearing and cachectic  ENT: Pharynx clear  Resp: No crackles. No wheezing. Good air movement  CV: S1, S2. No edema  GI: NT, ND, +BS  Skin: Warm and dry. Neuro: PERRL. Arousable but immediately falls back into deep sleep, not following commands.  Patellar reflexes are symmetric     Medications:   furosemide  20 mg Intravenous BID    dexamethasone  4 mg Intravenous Daily    pantoprazole  40 mg Oral QAM AC    sodium chloride flush  10 mL Intravenous 2 times per day    sodium chloride  20 mL Intravenous Once    folic acid  1 mg Oral Daily     PRN Meds:  ipratropium-albuterol, sodium chloride, sodium chloride flush, acetaminophen **OR** acetaminophen, polyethylene glycol, promethazine **OR** ondansetron, glucose, dextrose, glucagon (rDNA), dextrose, potassium chloride **OR** potassium alternative oral replacement **OR** potassium chloride, magnesium sulfate    Results:  CBC:   Recent Labs     12/10/20  0400 12/11/20  0519 12/12/20  0930   WBC 4.4 7.8 10.9   HGB 6.9* 12.3 13.3   HCT 22.6* 38.3 41.3   MCV 71.4* 77.9* 77.8*    243 273     BMP:   Recent Labs     12/10/20  0400 12/11/20  0519    141   K 3.5 4.0    101   CO2 31 33*   BUN 20 14   CREATININE <0.5* <0.5*     LIVER PROFILE:   Recent Labs     12/10/20  0400 12/11/20  0519   AST 14* 19   ALT 9* 12   BILITOT <0.2 0.5   ALKPHOS 34* 46     PT/INR: No results for input(s): PROTIME, INR in the last 72 hours. APTT: No results for input(s): APTT in the last 72 hours. UA:  No results for input(s): NITRITE, COLORU, PHUR, LABCAST, WBCUA, RBCUA, MUCUS, TRICHOMONAS, YEAST, BACTERIA, CLARITYU, SPECGRAV, LEUKOCYTESUR, UROBILINOGEN, BILIRUBINUR, BLOODU, GLUCOSEU, AMORPHOUS in the last 72 hours. Invalid input(s): Irven Guess    Cultures:  12/1/2020 SARS-CoV-2 positive  12/2/2020 respiratory culture NRF  12/2/2020 urine no growth    Films:  CXR 12/8/2020 ETT okay  CXR 12/12/2020 small bilateral effusions, largely unchanged     ASSESSMENT:  · Acute hypoxemic respiratory failure -hyper acutely worsened today, from 1.5 L to nonrebreather. Favor inability to clear thick secretions. · COVID-19 pneumonia  · Cachexia/failure to thrive  · Acute metabolic encephalopathy superimposed on underlying dementia  · Acute anemia -S/P 2 units PRBC  · Possible brief asystolic arrest early am 50/6/61 with unclear etiology - 30 seconds before ROSC     PLAN:  · Bilevel non-invasive positive pressure ventilation for life threatening respiratory failure - trial with repeat ABG @ 2 hours  · D#11 Decadron -now 4 mg  · Completed 5 days Remdesevir; s/p 1 U CCP 12/3/2020  · Completed 7 days ceftriaxone and 5 days azithromycin  · S/P 3 U PRBC total  · ICU care -SCD, hold Lovenox and follow hemoglobin; on BID protonix  · FC  · NOK Maurisio Martines (son)      Addendum: repeat ABG noted, worsened hypercapnia and acidemia and now requiring 100% FiO2 on BiPAP. Urgent intubation is indicated.   See my procedure note. Consider CTPA, awaiting repeat CXR and ABG. Per RT, copious thick tenacious secretions with high peak pressures. Total critical care time caring for this patient with life threatening, unstable organ failure, including direct patient contact, management of life support systems, review of data including imaging and labs, discussions with other team members and physicians is 45 minutes so far today, excluding procedures.

## 2020-12-12 NOTE — PROGRESS NOTES
Physical /Occupational Therapy                                            Patient transferred to same day surgery as ICU. Please re-order when patient is appropriate to resume PT/OT services.     Andre Dominguez, PT #[id number]  Brooklyn Dietrich OTR/L

## 2020-12-12 NOTE — PROGRESS NOTES
Hospitalist Progress Note      PCP: No primary care provider on file. Date of Admission: 12/1/2020    Subjective: pt transferred out of ICU yesterday, pt has been more hypoxic this am, ABG showed hypercapnic resp failure and hence pt placed on BIPAP and transferred to [facility] overflow     Medications:  Reviewed    Infusion Medications    dextrose       Scheduled Medications    furosemide  20 mg Intravenous BID    dexamethasone  4 mg Intravenous Daily    pantoprazole  40 mg Oral QAM AC    sodium chloride flush  10 mL Intravenous 2 times per day    sodium chloride  20 mL Intravenous Once    folic acid  1 mg Oral Daily     PRN Meds: ipratropium-albuterol, sodium chloride, sodium chloride flush, acetaminophen **OR** acetaminophen, polyethylene glycol, promethazine **OR** ondansetron, glucose, dextrose, glucagon (rDNA), dextrose, potassium chloride **OR** potassium alternative oral replacement **OR** potassium chloride, magnesium sulfate      Intake/Output Summary (Last 24 hours) at 12/12/2020 1306  Last data filed at 12/12/2020 6663  Gross per 24 hour   Intake 130 ml   Output 1050 ml   Net -920 ml       Physical Exam Performed:    BP (!) 168/95   Pulse 137   Temp 97.1 °F (36.2 °C) (Oral)   Resp 18   Ht 5' 2\" (1.575 m)   Wt 88 lb 3.2 oz (40 kg)   SpO2 92%   BMI 16.13 kg/m²     General: thin cachetic appearing elderly female on BIPAP  Mucous Membranes:  Pink , anicteric  Neck: No JVD, no carotid bruit, no thyromegaly  Chest:  diminished b/l  Cardiovascular:  RRR S1S2 heard, no murmurs or gallops  Abdomen:  Soft, undistended, non tender, no organomegaly, BS present  Extremities: left UE with abnormal bony projections  Severe muscle wasting of all ext  No edema or cyanosis.  Distal pulses well felt  Neurological : lethargic on BIPAP       Labs:   Recent Labs     12/10/20  0400 12/11/20  0519 12/12/20  0930   WBC 4.4 7.8 10.9   HGB 6.9* 12.3 13.3   HCT 22.6* 38.3 41.3    243 273     Recent Labs 12/10/20  0400 12/11/20  0519    141   K 3.5 4.0    101   CO2 31 33*   BUN 20 14   CREATININE <0.5* <0.5*   CALCIUM 7.8* 8.7     Recent Labs     12/10/20  0400 12/11/20  0519   AST 14* 19   ALT 9* 12   BILITOT <0.2 0.5   ALKPHOS 34* 46     No results for input(s): INR in the last 72 hours. No results for input(s): Swathi Lager in the last 72 hours. Urinalysis:      Lab Results   Component Value Date    NITRU Negative 12/01/2020    WBCUA 3-5 12/01/2020    BACTERIA 1+ 12/01/2020    RBCUA 0-2 12/01/2020    BLOODU Negative 12/01/2020    SPECGRAV 1.020 12/01/2020    GLUCOSEU Negative 12/01/2020       Radiology:  XR CHEST PORTABLE   Final Result   Stable chest demonstrating bilateral pleural effusions. XR CHEST PORTABLE   Final Result   No significant change in appearance of the chest over the past 24 hours with   persistent diffuse bilateral pulmonary disease and trace pleural fluid. There also is diffuse bowel dilation suggesting ileus         XR CHEST PORTABLE   Final Result   Essentially stable exam with probable pulmonary emphysema, and suspected   small pleural effusions with bibasilar atelectasis         XR CHEST PORTABLE   Final Result   Persistent small bilateral pleural effusions. XR CHEST PORTABLE   Final Result   No significant interval change. XR CHEST PORTABLE   Final Result   Stable exam.         XR CHEST PORTABLE   Final Result   Supportive tubing projects in normal position. Persistent pattern of pulmonary edema. Pneumonia remains a differential   possibility. Small right pleural effusion. IR PICC WO SQ PORT/PUMP > 5 YEARS   Final Result      XR CHEST PORTABLE   Final Result   Stable lines and catheters. Persistent multifocal interstitial opacities most pronounced both lung bases. Slight improved aeration of the upper and mid lungs. XR CHEST PORTABLE   Final Result   Worsening airspace opacities especially in the bases. XR CHEST PORTABLE   Final Result   1. Stable appropriate positions of support apparatus. 2. Stable multifocal pneumonia. CT HEAD WO CONTRAST   Final Result   No acute intracranial finding. MRI may be obtained if clinically indicated. XR CHEST PORTABLE   Final Result   Patient is intubated with tubes seen as above. Hyperinflated lungs. No   overt consolidation. Fracture deformity of the left proximal humerus is   incompletely imaged. Recommend comparison with clinical exam and follow-up   with dedicated views. XR CHEST PORTABLE   Final Result   Technically limited study performed portably with arms overlying the lower   chest.  Asymmetric ground-glass opacification appears present in the right   lower lung zone which may represent edema or developing pneumonitis. XR CHEST PORTABLE    (Results Pending)   XR CHEST PORTABLE    (Results Pending)   XR CHEST PORTABLE    (Results Pending)           Assessment/Plan:    Active Hospital Problems    Diagnosis    Severe protein-calorie malnutrition ([facility] [E43]    Anorexia [R63.0]    Acute metabolic encephalopathy [O66.48]    Pneumonia due to COVID-19 virus [U07.1, J12.89]    Acute respiratory failure with hypoxemia (HCC) [J96.01]    Acute respiratory failure with hypoxia (HCC) [J96.01]    Failure to thrive [ZGY8663]    BELKYS (acute kidney injury) ([facility] [facility] [N17.9]          #Acute hypoxic and hypercarbic respiratory failure  - 2/2 COVID 23  - She was intubated by ER provider, mechanical ventilation to be managed by pulmonary  - minimal support on vent, ongoing weaning trials per pulm. gets very agitated and anxious during trials.   Started on Precedex  Extubated 12/10  Now on BIPAP, might need to be re-intubated     #COVID 19 Pneumonia  -Pulmonary consulted  -IV Decadron day #10- being tapered -s/p remdesivir D5 and s/p convalescent plasma on 12/1  -completed Rocephin and Zithromax 5/5  - imaging with stable  ASD   - contact plus isolation        #Sepsis  - tachycardia, tachypnea, leucocytosis  -Secondary to COVID-19 pneumonia  -Antibiotics as above  -Follow-up cultures     Brief Asystole with cpr requirement on 12/3 , during nursing care  - no meds given, ROSC with in few sec  - stable since      #Acute metabolic encephalopathy  -Suspect related to hypercarbia, sepsis, acute infection  -She has a nonfocal neurologic exam, she is moving all extremities.  She does have a deformed left upper extremity secondary to a distal humerus fracture which was never repaired.   -Management of sepsis, respiratory failure and infection as above  -Checked CT head - neg    -Checked TSH (normal),  Low folic acid , replace  -Checked ethanol level- neg  Continues to be disoriented - now on BIPAP     #Hyperkalemia-> hypokalemia   resolved     # Acute blood loss anemia, on chronic anemia  History of peptic ulcer disease  -Gastric ulcer noted on EGD in 2013  -Start IV PPI bid , h.h dropped from 8.7 to 6.5, partly with IV fluid boluses   - s.p 1 unit pRBC   - iron studies with folate and iron def, started supplements   Hg 6.9  - transfused 2  unit of red cells today  Hb 12.3-->13.3     #Left distal humerus fracture 2018  -Chronic deformity of the left upper extremity, will check xray left humerus     #Reported history of alcohol abuse  -Unclear if she is still consuming alcohol.  Checked an ethanol level- negative     #Severe PCM  - extreme muscle wasting.  Will need dietitian eval when extubated  - checked TSH wnl       DVT Prophylaxis: Lovenox  Diet: DIET GENERAL; Dysphagia Pureed; Mildly Thick (Nectar)  Code Status: Full Code    PT/OT Eval Status: ordered    Dispo - transfer to [facility] on BIPAP, might need to be re-intubated    Shakira Liu MD

## 2020-12-12 NOTE — PROGRESS NOTES
VSS - afebrile. Pt is alert but disoriented x 4 with no reported history of falls. Assessment completed as charted. Bed is in lowest position with 3/4 bed rails raised, bed alarm turned on, wheels locked and call light within reach - patient wearing non-skid socks. No further requests at this time. Will continue to monitor.      Vitals:    12/11/20 1957   BP: 127/84   Pulse: 94   Resp: 18   Temp: 97.2 °F (36.2 °C)   SpO2: 93%

## 2020-12-12 NOTE — PROGRESS NOTES
Called to room per Sg for pt desaturation to 79% on 1.5L/NC/O2. Attempted to titrate up O2 when reached 11L/NC high flow unsuccessfully changed to NRM @ 15% called RT to bedside to assist. Lungs sounds clear but very diminished bilaterally, moist weak cough. Called for clinical .

## 2020-12-12 NOTE — PROGRESS NOTES
Report given to Kika De Paz RN remains at bedside until pt transferred to overflow unit. Called spoke with pt's son Maria Guadalupe Mcfarland and given updated.

## 2020-12-12 NOTE — PROGRESS NOTES
Called to room at 0900 due to pt on nonrebreather with a sat of 82-84%. abg drawn and sent to lab and cxr ordered. Updated Dr Trevizo Masker and received orders. Bipap placed at 0940 after does of ativan per order. This nurse is remaining at bedside with pt on bipap. O2 sat 99% and -140. Will continue to monitor.   Rozina Richter RN

## 2020-12-12 NOTE — PROGRESS NOTES
Pt placed on Bipap 16/8 and 100%. Pt titrated to 20/10 to maintain SpO2 greater than 90% and Vt greater than 300 cc's.

## 2020-12-13 ENCOUNTER — APPOINTMENT (OUTPATIENT)
Dept: GENERAL RADIOLOGY | Age: 70
DRG: 870 | End: 2020-12-13
Payer: MEDICARE

## 2020-12-13 LAB
ANION GAP SERPL CALCULATED.3IONS-SCNC: 7 MMOL/L (ref 3–16)
BASE EXCESS ARTERIAL: 3.3 MMOL/L (ref -3–3)
BASOPHILS ABSOLUTE: 0.1 K/UL (ref 0–0.2)
BASOPHILS RELATIVE PERCENT: 1 %
BUN BLDV-MCNC: 27 MG/DL (ref 7–20)
CALCIUM SERPL-MCNC: 8.4 MG/DL (ref 8.3–10.6)
CARBOXYHEMOGLOBIN ARTERIAL: 0.4 % (ref 0–1.5)
CHLORIDE BLD-SCNC: 99 MMOL/L (ref 99–110)
CO2: 30 MMOL/L (ref 21–32)
CREAT SERPL-MCNC: <0.5 MG/DL (ref 0.6–1.2)
EOSINOPHILS ABSOLUTE: 0 K/UL (ref 0–0.6)
EOSINOPHILS RELATIVE PERCENT: 0.4 %
GFR AFRICAN AMERICAN: >60
GFR NON-AFRICAN AMERICAN: >60
GLUCOSE BLD-MCNC: 116 MG/DL (ref 70–99)
GLUCOSE BLD-MCNC: 117 MG/DL (ref 70–99)
GLUCOSE BLD-MCNC: 185 MG/DL (ref 70–99)
GLUCOSE BLD-MCNC: 98 MG/DL (ref 70–99)
HCO3 ARTERIAL: 25.8 MMOL/L (ref 21–29)
HCT VFR BLD CALC: 36.8 % (ref 36–48)
HEMOGLOBIN, ART, EXTENDED: 12.6 G/DL (ref 12–16)
HEMOGLOBIN: 11.7 G/DL (ref 12–16)
LYMPHOCYTES ABSOLUTE: 0.7 K/UL (ref 1–5.1)
LYMPHOCYTES RELATIVE PERCENT: 10.7 %
MAGNESIUM: 2 MG/DL (ref 1.8–2.4)
MCH RBC QN AUTO: 25 PG (ref 26–34)
MCHC RBC AUTO-ENTMCNC: 31.8 G/DL (ref 31–36)
MCV RBC AUTO: 78.7 FL (ref 80–100)
METHEMOGLOBIN ARTERIAL: 0.3 %
MONOCYTES ABSOLUTE: 0.4 K/UL (ref 0–1.3)
MONOCYTES RELATIVE PERCENT: 6.5 %
NEUTROPHILS ABSOLUTE: 5 K/UL (ref 1.7–7.7)
NEUTROPHILS RELATIVE PERCENT: 81.4 %
O2 CONTENT ARTERIAL: 17 ML/DL
O2 SAT, ARTERIAL: 96.9 %
O2 THERAPY: ABNORMAL
PCO2 ARTERIAL: 32.8 MMHG (ref 35–45)
PDW BLD-RTO: 31 % (ref 12.4–15.4)
PERFORMED ON: ABNORMAL
PERFORMED ON: ABNORMAL
PERFORMED ON: NORMAL
PH ARTERIAL: 7.51 (ref 7.35–7.45)
PLATELET # BLD: 200 K/UL (ref 135–450)
PMV BLD AUTO: 8.6 FL (ref 5–10.5)
PO2 ARTERIAL: 80.4 MMHG (ref 75–108)
POTASSIUM SERPL-SCNC: 3.3 MMOL/L (ref 3.5–5.1)
RBC # BLD: 4.68 M/UL (ref 4–5.2)
SODIUM BLD-SCNC: 136 MMOL/L (ref 136–145)
TCO2 ARTERIAL: 26.8 MMOL/L
WBC # BLD: 6.2 K/UL (ref 4–11)

## 2020-12-13 PROCEDURE — 94640 AIRWAY INHALATION TREATMENT: CPT

## 2020-12-13 PROCEDURE — 6370000000 HC RX 637 (ALT 250 FOR IP): Performed by: INTERNAL MEDICINE

## 2020-12-13 PROCEDURE — 99233 SBSQ HOSP IP/OBS HIGH 50: CPT | Performed by: INTERNAL MEDICINE

## 2020-12-13 PROCEDURE — C9113 INJ PANTOPRAZOLE SODIUM, VIA: HCPCS | Performed by: INTERNAL MEDICINE

## 2020-12-13 PROCEDURE — 94761 N-INVAS EAR/PLS OXIMETRY MLT: CPT

## 2020-12-13 PROCEDURE — 6360000002 HC RX W HCPCS: Performed by: INTERNAL MEDICINE

## 2020-12-13 PROCEDURE — 82803 BLOOD GASES ANY COMBINATION: CPT

## 2020-12-13 PROCEDURE — 94003 VENT MGMT INPAT SUBQ DAY: CPT

## 2020-12-13 PROCEDURE — 99291 CRITICAL CARE FIRST HOUR: CPT | Performed by: INTERNAL MEDICINE

## 2020-12-13 PROCEDURE — 2580000003 HC RX 258: Performed by: INTERNAL MEDICINE

## 2020-12-13 PROCEDURE — 2000000000 HC ICU R&B

## 2020-12-13 PROCEDURE — 83735 ASSAY OF MAGNESIUM: CPT

## 2020-12-13 PROCEDURE — 85025 COMPLETE CBC W/AUTO DIFF WBC: CPT

## 2020-12-13 PROCEDURE — 36600 WITHDRAWAL OF ARTERIAL BLOOD: CPT

## 2020-12-13 PROCEDURE — 2700000000 HC OXYGEN THERAPY PER DAY

## 2020-12-13 PROCEDURE — 36592 COLLECT BLOOD FROM PICC: CPT

## 2020-12-13 PROCEDURE — 94750 HC PULMONARY COMPLIANCE STUDY: CPT

## 2020-12-13 PROCEDURE — 80048 BASIC METABOLIC PNL TOTAL CA: CPT

## 2020-12-13 PROCEDURE — 71045 X-RAY EXAM CHEST 1 VIEW: CPT

## 2020-12-13 PROCEDURE — 36415 COLL VENOUS BLD VENIPUNCTURE: CPT

## 2020-12-13 RX ORDER — ACETYLCYSTEINE 200 MG/ML
600 SOLUTION ORAL; RESPIRATORY (INHALATION) 2 TIMES DAILY
Status: DISCONTINUED | OUTPATIENT
Start: 2020-12-13 | End: 2020-12-14

## 2020-12-13 RX ORDER — DEXAMETHASONE 4 MG/1
4 TABLET ORAL DAILY
Status: DISCONTINUED | OUTPATIENT
Start: 2020-12-13 | End: 2020-12-14

## 2020-12-13 RX ORDER — SODIUM CHLORIDE FOR INHALATION 3 %
4 VIAL, NEBULIZER (ML) INHALATION 2 TIMES DAILY
Status: DISCONTINUED | OUTPATIENT
Start: 2020-12-13 | End: 2020-12-14

## 2020-12-13 RX ADMIN — CARBOXYMETHYLCELLULOSE SODIUM 1 DROP: 10 GEL OPHTHALMIC at 17:40

## 2020-12-13 RX ADMIN — SODIUM CHLORIDE 30 MG/ML INHALATION SOLUTION 4 ML: 30 SOLUTION INHALANT at 19:22

## 2020-12-13 RX ADMIN — WHITE PETROLATUM 57.7 %-MINERAL OIL 31.9 % EYE OINTMENT: at 04:28

## 2020-12-13 RX ADMIN — CARBOXYMETHYLCELLULOSE SODIUM 1 DROP: 10 GEL OPHTHALMIC at 11:34

## 2020-12-13 RX ADMIN — WHITE PETROLATUM 57.7 %-MINERAL OIL 31.9 % EYE OINTMENT: at 00:02

## 2020-12-13 RX ADMIN — POTASSIUM BICARBONATE 40 MEQ: 782 TABLET, EFFERVESCENT ORAL at 11:32

## 2020-12-13 RX ADMIN — FOLIC ACID 1 MG: 1 TABLET ORAL at 11:33

## 2020-12-13 RX ADMIN — ACETYLCYSTEINE 600 MG: 200 SOLUTION ORAL; RESPIRATORY (INHALATION) at 12:03

## 2020-12-13 RX ADMIN — CARBOXYMETHYLCELLULOSE SODIUM 1 DROP: 10 GEL OPHTHALMIC at 04:28

## 2020-12-13 RX ADMIN — PANTOPRAZOLE SODIUM 40 MG: 40 INJECTION, POWDER, FOR SOLUTION INTRAVENOUS at 11:33

## 2020-12-13 RX ADMIN — Medication 15 ML: at 11:33

## 2020-12-13 RX ADMIN — IPRATROPIUM BROMIDE AND ALBUTEROL SULFATE 1 AMPULE: .5; 3 SOLUTION RESPIRATORY (INHALATION) at 23:33

## 2020-12-13 RX ADMIN — Medication 10 ML: at 11:33

## 2020-12-13 RX ADMIN — ACETYLCYSTEINE 600 MG: 200 SOLUTION ORAL; RESPIRATORY (INHALATION) at 23:33

## 2020-12-13 RX ADMIN — PROPOFOL 60 MCG/KG/MIN: 10 INJECTION, EMULSION INTRAVENOUS at 23:55

## 2020-12-13 RX ADMIN — Medication 15 ML: at 20:26

## 2020-12-13 RX ADMIN — ENOXAPARIN SODIUM 30 MG: 30 INJECTION SUBCUTANEOUS at 11:31

## 2020-12-13 RX ADMIN — CARBOXYMETHYLCELLULOSE SODIUM 1 DROP: 10 GEL OPHTHALMIC at 15:15

## 2020-12-13 RX ADMIN — Medication 10 ML: at 20:25

## 2020-12-13 RX ADMIN — PROPOFOL 30 MCG/KG/MIN: 10 INJECTION, EMULSION INTRAVENOUS at 06:30

## 2020-12-13 RX ADMIN — PROPOFOL 40 MCG/KG/MIN: 10 INJECTION, EMULSION INTRAVENOUS at 17:40

## 2020-12-13 RX ADMIN — WHITE PETROLATUM 57.7 %-MINERAL OIL 31.9 % EYE OINTMENT: at 11:34

## 2020-12-13 RX ADMIN — DEXAMETHASONE 4 MG: 4 TABLET ORAL at 11:33

## 2020-12-13 RX ADMIN — WHITE PETROLATUM 57.7 %-MINERAL OIL 31.9 % EYE OINTMENT: at 20:26

## 2020-12-13 RX ADMIN — WHITE PETROLATUM 57.7 %-MINERAL OIL 31.9 % EYE OINTMENT: at 17:40

## 2020-12-13 RX ADMIN — IPRATROPIUM BROMIDE AND ALBUTEROL SULFATE 1 AMPULE: .5; 3 SOLUTION RESPIRATORY (INHALATION) at 19:22

## 2020-12-13 ASSESSMENT — PULMONARY FUNCTION TESTS
PIF_VALUE: 24
PIF_VALUE: 21
PIF_VALUE: 30
PIF_VALUE: 20
PIF_VALUE: 30
PIF_VALUE: 29

## 2020-12-13 ASSESSMENT — PAIN SCALES - GENERAL
PAINLEVEL_OUTOF10: 0

## 2020-12-13 NOTE — PROGRESS NOTES
[facility] Patient has rested comfortably on the ventilator overnight without acute changes in assessment noted. Propofol continues at 30mcg/kg/min. Care transferred.

## 2020-12-13 NOTE — PROGRESS NOTES
12/13/20 0310   Vent Information   Vent Type 840   Vent Mode AC/VC   Vt Ordered 290 mL   Rate Set 28 bmp   Peak Flow 65 L/min   FiO2  30 %   SpO2 98 %   SpO2/FiO2 ratio 326.67   Sensitivity 3   PEEP/CPAP 5   Humidification Source Heated wire   Humidification Temp 37   Humidification Temp Measured 36.9   Vent Patient Data   Peak Inspiratory Pressure 30 cmH2O   Mean Airway Pressure 10 cmH20   Rate Measured 28 br/min   Vt Exhaled 316 mL   Minute Volume 8.48 Liters   I:E Ratio 1:3.5   Plateau Pressure 13 [id number]   Cough/Sputum   Sputum How Obtained Suctioned;Endotracheal   Cough Productive   Sputum Amount Moderate   Sputum Color Creamy   Tenacity Thick   Spontaneous Breathing Trial (SBT) RT Doc   Pulse 104   Breath Sounds   Right Upper Lobe Diminished   Right Middle Lobe Diminished   Right Lower Lobe Diminished   Left Upper Lobe Diminished   Left Lower Lobe Diminished   Additional Respiratory  Assessments   Resp 16   Position Semi-Solano's   Oral Care Completed? Yes   Oral Care Mouth suctioned   Subglottic Suction Done?  Yes   Alarm Settings   High Pressure Alarm 45 cmH2O   Low Minute Volume Alarm 4 L/min   Apnea (secs) 20 secs   High Respiratory Rate 40 br/min   Low Exhaled Vt  200 mL   Non-Surgical Airway   Placement Date/Time: 12/12/20 1510   Timeout: Patient;Procedure;Site/Side;Appropriate Equipment  Placed By: Licensed provider  Inserted by: Dr Carrie Reyes  Size: 7.5   Secured at 22 cm   Measured From Lips   Secured Location Left   Secured By Commercial tube gale   Site Condition Dry

## 2020-12-13 NOTE — PLAN OF CARE
Problem: Airway Clearance - Ineffective  Goal: Achieve or maintain patent airway  Outcome: Ongoing     Problem: Gas Exchange - Impaired  Goal: Absence of hypoxia  Outcome: Ongoing  Goal: Promote optimal lung function  Outcome: Ongoing     Problem: Breathing Pattern - Ineffective  Goal: Ability to achieve and maintain a regular respiratory rate  Outcome: Ongoing     Problem: Body Temperature -  Risk of, Imbalanced  Goal: Ability to maintain a body temperature within defined limits  Outcome: Ongoing  Goal: Will regain or maintain usual level of consciousness  Outcome: Ongoing  Goal: Complications related to the disease process, condition or treatment will be avoided or minimized  Outcome: Ongoing     Problem: Isolation Precautions - Risk of Spread of Infection  Goal: Prevent transmission of infection  Outcome: Ongoing     Problem: Nutrition Deficits  Goal: Optimize nutrtional status  Outcome: Ongoing  Note: Patient receiving nutrition via continuous tube feed. Problem: Risk for Fluid Volume Deficit  Goal: Maintain normal heart rhythm  Outcome: Ongoing  Goal: Maintain absence of muscle cramping  Outcome: Ongoing  Goal: Maintain normal serum potassium, sodium, calcium, phosphorus, and pH  Outcome: Ongoing     Problem: Loneliness or Risk for Loneliness  Goal: Demonstrate positive use of time alone when socialization is not possible  Outcome: Ongoing     Problem: Fatigue  Goal: Verbalize increase energy and improved vitality  Outcome: Ongoing     Problem: Patient Education: Go to Patient Education Activity  Goal: Patient/Family Education  Outcome: Ongoing     Problem: Restraint Use - Nonviolent/Non-Self-Destructive Behavior:  Goal: Absence of restraint indications  Description: Absence of restraint indications  Outcome: Ongoing  Note: Bilateral soft wrist restraints remain in place for patient safety, and to protect all lines and airways.    Goal: Absence of restraint-related injury  Description: Absence of restraint-related injury  Outcome: Ongoing     Problem: Falls - Risk of:  Goal: Will remain free from falls  Description: Will remain free from falls  Outcome: Ongoing  Note: Fall precautions in place. Goal: Absence of physical injury  Description: Absence of physical injury  Outcome: Ongoing     Problem: Skin Integrity:  Goal: Will show no infection signs and symptoms  Description: Will show no infection signs and symptoms  Outcome: Ongoing  Note: Patient turned/repositioned every 2 hours and as needed to maintain and improve skin integrity. Goal: Absence of new skin breakdown  Description: Absence of new skin breakdown  Outcome: Ongoing     Problem: Nutrition  Goal: Optimal nutrition therapy  Outcome: Ongoing  Goal: Understanding of nutritional guidelines  Outcome: Ongoing     Problem: Infection - Central Venous Catheter-Associated Bloodstream Infection:  Goal: Will show no infection signs and symptoms  Description: Will show no infection signs and symptoms  Outcome: Ongoing  Note: Patient turned/repositioned every 2 hours and as needed to maintain and improve skin integrity.

## 2020-12-13 NOTE — PROGRESS NOTES
12/12/20 1927   Vent Information   $Ventilation $Subsequent Day   Vent Type 840   Vent Mode AC/VC   Vt Ordered 290 mL   Rate Set 28 bmp   Peak Flow 65 L/min   FiO2  40 %   SpO2 100 %   SpO2/FiO2 ratio 250   Sensitivity 3   PEEP/CPAP 5   Humidification Source Heated wire   Humidification Temp 37   Vent Patient Data   Peak Inspiratory Pressure 27 cmH2O   Mean Airway Pressure 9.5 cmH20   Rate Measured 28 br/min   Vt Exhaled 305 mL   Minute Volume 8.57 Liters   I:E Ratio 1:3.5   Plateau Pressure 14 [id number]   Static Compliance 34 mL/cmH2O   Dynamic Compliance 14 mL/cmH2O   Cough/Sputum   Sputum How Obtained Suctioned;Endotracheal   Spontaneous Breathing Trial (SBT) RT Doc   Pulse 80   Breath Sounds   Right Upper Lobe Diminished   Right Middle Lobe Diminished   Right Lower Lobe Diminished   Left Upper Lobe Diminished   Left Lower Lobe Diminished   Additional Respiratory  Assessments   Resp 28   Position Semi-Solano's   Oral Care Completed? Yes   Oral Care Mouth suctioned   Subglottic Suction Done?  Yes   Alarm Settings   High Pressure Alarm 45 cmH2O   Low Minute Volume Alarm 4 L/min   Apnea (secs) 20 secs   High Respiratory Rate 40 br/min   Low Exhaled Vt  200 mL   Non-Surgical Airway   Placement Date/Time: 12/12/20 1510   Timeout: Patient;Procedure;Site/Side;Appropriate Equipment  Placed By: Licensed provider  Inserted by: Dr Carnes Ronda  Size: 7.5   Secured at 22 cm   Measured From Lips   Secured Location Right   Secured By Commercial tube gale   Site Condition Dry

## 2020-12-13 NOTE — PROGRESS NOTES
Shift assessment completed, see flow sheet. RASS 0. Following commands. Intubated and sedated on AC # 7.5 ETT, at 23 LL. 28 / 290 / 30 %/ +5. SpO2 98%. Respirations are easy, even, and unlabored. Bilateral lung sounds diminished throughout. VSS  OG in place at 55, with TF at 30 mL/hr. PICC WDL  Propofol infusing at 30 mcg/kg/min. All lines and monitoring devices in place. Bernard is patent and secured. Bilateral soft wrist restraints in place for patient safety. Bed in lowest position with wheels locked. No needs expressed at this time. Will continue to monitor.

## 2020-12-13 NOTE — PROGRESS NOTES
Hospitalist Progress Note      PCP: No primary care provider on file. Date of Admission: 12/1/2020     Admitted with Covid pneumonia, acute respiratory failure and altered mental status    Subjective: pt transferred out of ICU yesterday, pt has been more hypoxic this am, ABG showed hypercapnic resp failure and hence pt placed on BIPAP and transferred to surge overflow       12/13  Transferred back to the ICU yesterday for acute decompensation and increasing oxygen requirement. Intubated and started on mechanical ventilation.     Medications:  Reviewed    Infusion Medications    propofol 30 mcg/kg/min (12/13/20 0630)    norepinephrine      dextrose       Scheduled Medications    potassium bicarb-citric acid  40 mEq Oral Once    enoxaparin  30 mg Subcutaneous Daily    dexamethasone  4 mg Oral Daily    carboxymethylcellulose PF  1 drop Both Eyes Q4H    And    artificial tears   Both Eyes Q4H    chlorhexidine  15 mL Mouth/Throat BID    pantoprazole  40 mg Intravenous Daily    sodium chloride flush  10 mL Intravenous 2 times per day    sodium chloride  20 mL Intravenous Once    folic acid  1 mg Oral Daily     PRN Meds: fentanNYL, albuterol sulfate HFA **AND** ipratropium **AND** MDI Treatment, midazolam, norepinephrine, iopamidol, ipratropium-albuterol, sodium chloride, sodium chloride flush, acetaminophen **OR** acetaminophen, polyethylene glycol, promethazine **OR** ondansetron, glucose, dextrose, glucagon (rDNA), dextrose, potassium chloride **OR** potassium alternative oral replacement **OR** potassium chloride, magnesium sulfate      Intake/Output Summary (Last 24 hours) at 12/13/2020 1011  Last data filed at 12/13/2020 0200  Gross per 24 hour   Intake 1404 ml   Output 772 ml   Net 632 ml       Physical Exam Performed:    /79   Pulse 94   Temp 98.4 °F (36.9 °C) (Bladder)   Resp 28   Ht 5' 2\" (1.575 m)   Wt 64 lb 14.4 oz (29.4 kg)   SpO2 100%   BMI 11.87 kg/m²     General: thin cachetic appearing elderly female on the vent   Mucous Membranes:  Pink , anicteric  Neck: No JVD, no carotid bruit, no thyromegaly  Chest:  diminished b/l  Cardiovascular:  RRR S1S2 heard, no murmurs or gallops  Abdomen:  Soft, undistended, non tender, no organomegaly, BS present  Extremities: left UE with abnormal bony projections  Severe muscle wasting of all ext  No edema or cyanosis. Distal pulses well felt  Neurological : awake on sedation, on the vent        Labs:   Recent Labs     12/11/20 0519 12/12/20 0930 12/13/20  0435   WBC 7.8 10.9 6.2   HGB 12.3 13.3 11.7*   HCT 38.3 41.3 36.8    273 200     Recent Labs     12/11/20 0519 12/12/20 0930 12/13/20  0435    134* 136   K 4.0 4.5 3.3*    96* 99   CO2 33* 31 30   BUN 14 17 27*   CREATININE <0.5* <0.5* <0.5*   CALCIUM 8.7 9.2 8.4     Recent Labs     12/11/20 0519   AST 19   ALT 12   BILITOT 0.5   ALKPHOS 46     No results for input(s): INR in the last 72 hours. No results for input(s): Zettie Binet in the last 72 hours. Urinalysis:      Lab Results   Component Value Date    NITRU Negative 12/01/2020    WBCUA 3-5 12/01/2020    BACTERIA 1+ 12/01/2020    RBCUA 0-2 12/01/2020    BLOODU Negative 12/01/2020    SPECGRAV 1.020 12/01/2020    GLUCOSEU Negative 12/01/2020       Radiology:  XR CHEST PORTABLE   Final Result   Possible small right pleural effusion. CT CHEST PULMONARY EMBOLISM W CONTRAST   Final Result   1. Small bilateral pleural effusions, right greater than left. XR CHEST PORTABLE   Final Result   1. Endotracheal tube in satisfactory position above the ryley   2. NG tip in gastric fundus and side port near GE junction   3. Otherwise, stable chest         XR CHEST PORTABLE   Final Result   Stable chest demonstrating bilateral pleural effusions.          XR CHEST PORTABLE   Final Result   No significant change in appearance of the chest over the past 24 hours with   persistent diffuse bilateral pulmonary disease and trace pleural fluid. There also is diffuse bowel dilation suggesting ileus         XR CHEST PORTABLE   Final Result   Essentially stable exam with probable pulmonary emphysema, and suspected   small pleural effusions with bibasilar atelectasis         XR CHEST PORTABLE   Final Result   Persistent small bilateral pleural effusions. XR CHEST PORTABLE   Final Result   No significant interval change. XR CHEST PORTABLE   Final Result   Stable exam.         XR CHEST PORTABLE   Final Result   Supportive tubing projects in normal position. Persistent pattern of pulmonary edema. Pneumonia remains a differential   possibility. Small right pleural effusion. IR PICC WO SQ PORT/PUMP > 5 YEARS   Final Result      XR CHEST PORTABLE   Final Result   Stable lines and catheters. Persistent multifocal interstitial opacities most pronounced both lung bases. Slight improved aeration of the upper and mid lungs. XR CHEST PORTABLE   Final Result   Worsening airspace opacities especially in the bases. XR CHEST PORTABLE   Final Result   1. Stable appropriate positions of support apparatus. 2. Stable multifocal pneumonia. CT HEAD WO CONTRAST   Final Result   No acute intracranial finding. MRI may be obtained if clinically indicated. XR CHEST PORTABLE   Final Result   Patient is intubated with tubes seen as above. Hyperinflated lungs. No   overt consolidation. Fracture deformity of the left proximal humerus is   incompletely imaged. Recommend comparison with clinical exam and follow-up   with dedicated views. XR CHEST PORTABLE   Final Result   Technically limited study performed portably with arms overlying the lower   chest.  Asymmetric ground-glass opacification appears present in the right   lower lung zone which may represent edema or developing pneumonitis.          XR HUMERUS LEFT (MIN 2 VIEWS)    (Results Pending)   XR CHEST PORTABLE

## 2020-12-13 NOTE — PROGRESS NOTES
Pulmonary & Critical Care Medicine ICU Progress Note      CC: COVID-19, hypoxemia, AMS    Events of Last 24 hours:    Acutely decompensated yesterday morning, failed BiPAP, required intubation by me. Copious very thick secretions per RT    Invasive Lines:   12/4/2020 PICC    MV: 12/1/2020-12/10/2020; reintubated on 12/12/2020 for severe hypoxemia/presumably secondary to secretions  Vent Mode: AC/VC Rate Set: 28 bmp/Vt Ordered: 290 mL/ /FiO2 : 30 %  Recent Labs     12/12/20  1550 12/13/20  0435   PHART 7.413 7.514*   DWD9HKE 47.1* 32.8*   PO2ART 150.6* 80.4       IV:   propofol 30 mcg/kg/min (12/13/20 0630)    norepinephrine      dextrose         EXAM:  /64   Pulse 83   Temp 99.1 °F (37.3 °C) (Bladder)   Resp 28   Ht 5' 2\" (1.575 m)   Wt 64 lb 14.4 oz (29.4 kg)   SpO2 97%   BMI 11.87 kg/m²  Vent 30%    Intake/Output Summary (Last 24 hours) at 12/13/2020 0651  Last data filed at 12/13/2020 0200  Gross per 24 hour   Intake 1404 ml   Output 1172 ml   Net 232 ml     EXAM (COVID or R/O):  General: intubated, ill appearing    ENT:   Pharynx with ETT. Neck: Trachea midline  Resp: No accessory muscle use. CV: Regular rate. Regular rhythm. GI:  Non-distended.     Neuro: Sedated  Psych: Unable to obtain because the patient is non-communicative      Medications:   dexamethasone  4 mg Intravenous Daily    carboxymethylcellulose PF  1 drop Both Eyes Q4H    And    artificial tears   Both Eyes Q4H    chlorhexidine  15 mL Mouth/Throat BID    pantoprazole  40 mg Intravenous Daily    sodium chloride flush  10 mL Intravenous 2 times per day    sodium chloride  20 mL Intravenous Once    folic acid  1 mg Oral Daily     PRN Meds:  fentanNYL, albuterol sulfate HFA **AND** ipratropium **AND** MDI Treatment, midazolam, norepinephrine, iopamidol, ipratropium-albuterol, sodium chloride, sodium chloride flush, acetaminophen **OR** acetaminophen, polyethylene glycol, promethazine **OR** ondansetron, glucose, dextrose, glucagon (rDNA), dextrose, potassium chloride **OR** potassium alternative oral replacement **OR** potassium chloride, magnesium sulfate    Results:  CBC:   Recent Labs     12/11/20  0519 12/12/20 0930 12/13/20  0435   WBC 7.8 10.9 6.2   HGB 12.3 13.3 11.7*   HCT 38.3 41.3 36.8   MCV 77.9* 77.8* 78.7*    273 200     BMP:   Recent Labs     12/11/20 0519 12/12/20 0930 12/13/20  0435    134* 136   K 4.0 4.5 3.3*    96* 99   CO2 33* 31 30   BUN 14 17 27*   CREATININE <0.5* <0.5* <0.5*     LIVER PROFILE:   Recent Labs     12/11/20 0519   AST 19   ALT 12   BILITOT 0.5   ALKPHOS 46     PT/INR: No results for input(s): PROTIME, INR in the last 72 hours. APTT:   Recent Labs     12/12/20  1545   APTT 35.3     UA:  No results for input(s): NITRITE, COLORU, PHUR, LABCAST, WBCUA, RBCUA, MUCUS, TRICHOMONAS, YEAST, BACTERIA, CLARITYU, SPECGRAV, LEUKOCYTESUR, UROBILINOGEN, BILIRUBINUR, BLOODU, GLUCOSEU, AMORPHOUS in the last 72 hours. Invalid input(s): Giovana York    Cultures:  12/1/2020 SARS-CoV-2 positive  12/2/2020 respiratory culture NRF  12/2/2020 urine no growth  12/12/2020 tracheal aspirate sent    Films:  CXR 12/8/2020 ETT okay  CXR 12/12/2020 small bilateral effusions, largely unchanged     ASSESSMENT:  · Acute hypoxemic respiratory failure -hyper acutely worsened today, from 1.5 L to nonrebreather. Favor inability to clear thick secretions. · COVID-19 pneumonia  · Cachexia/failure to thrive  · Acute metabolic encephalopathy superimposed on underlying dementia  · Acute anemia -S/P 2 units PRBC  · Possible brief asystolic arrest early am 71/6/11 with unclear etiology - 30 seconds before ROSC     PLAN:  Mechanical ventilation as per my orders. The ventilator was adjusted by me at the bedside for unstable, life threatening respiratory failure.   Decrease rate   · IV Propofol for sedation  · D#12 Decadron -now 4 mg  · Completed 5 days Remdesevir; s/p 1 U [facility] 12/3/2020  · Completed 7 days ceftriaxone and 5 days azithromycin  · S/P 3 U PRBC total  · Will need to consider tracheostomy tube and PEG placement given inability to handle secretions. I did start hypertonic nebs and Mucomyst nebs today to try to help with secretions. · ICU care -start lovenox and follow hemoglobin; on BID protonix  · FC  · MARISELAK Louisa Tran (son)      Total critical care time caring for this patient with life threatening, unstable organ failure, including direct patient contact, management of life support systems, review of data including imaging and labs, discussions with other team members and physicians is 31 minutes so far today, excluding procedures.

## 2020-12-13 NOTE — PROGRESS NOTES
12/12/20 2323   Vent Information   Vent Type 840   Vent Mode AC/VC   Vt Ordered 290 mL   Rate Set 28 bmp   Peak Flow 65 L/min   FiO2  30 %   SpO2 100 %   SpO2/FiO2 ratio 333.33   Sensitivity 3   PEEP/CPAP 5   Humidification Source Heated wire   Humidification Temp 37   Humidification Temp Measured 36.8   Vent Patient Data   Peak Inspiratory Pressure 27 cmH2O   Mean Airway Pressure 9.5 cmH20   Rate Measured 28 br/min   Vt Exhaled 302 mL   Minute Volume 8.45 Liters   I:E Ratio 1:3.5   Plateau Pressure 14 [id number]   Cough/Sputum   Sputum How Obtained Suctioned;Endotracheal   Cough Productive   Sputum Amount Small   Sputum Color Creamy   Tenacity Thick   Spontaneous Breathing Trial (SBT) RT Doc   Pulse 84   Breath Sounds   Right Upper Lobe Diminished   Right Middle Lobe Diminished   Right Lower Lobe Diminished   Left Upper Lobe Diminished   Left Lower Lobe Diminished   Additional Respiratory  Assessments   Resp 28   Position Semi-Solano's   Oral Care Completed? Yes   Oral Care Mouth suctioned   Subglottic Suction Done?  Yes   Alarm Settings   High Pressure Alarm 45 cmH2O   Low Minute Volume Alarm 4 L/min   Apnea (secs) 20 secs   High Respiratory Rate 40 br/min   Low Exhaled Vt  200 mL   Non-Surgical Airway   Placement Date/Time: 12/12/20 1510   Timeout: Patient;Procedure;Site/Side;Appropriate Equipment  Placed By: Licensed provider  Inserted by: Dr Jones Servant  Size: 7.5   Secured at 22 cm   Measured From [address] [employer] tube gale   Site Condition Dry

## 2020-12-13 NOTE — PROGRESS NOTES
Bedside report given to [facility]. POC transferred.  Shellie Bernardo

## 2020-12-14 ENCOUNTER — APPOINTMENT (OUTPATIENT)
Dept: GENERAL RADIOLOGY | Age: 70
DRG: 870 | End: 2020-12-14
Payer: MEDICARE

## 2020-12-14 LAB
ANION GAP SERPL CALCULATED.3IONS-SCNC: 4 MMOL/L (ref 3–16)
BASE EXCESS ARTERIAL: 8.5 MMOL/L (ref -3–3)
BASE EXCESS VENOUS: 7.4 MMOL/L (ref -3–3)
BASOPHILS ABSOLUTE: 0 K/UL (ref 0–0.2)
BASOPHILS RELATIVE PERCENT: 0.3 %
BUN BLDV-MCNC: 24 MG/DL (ref 7–20)
CALCIUM SERPL-MCNC: 8.7 MG/DL (ref 8.3–10.6)
CARBOXYHEMOGLOBIN ARTERIAL: 0.4 % (ref 0–1.5)
CARBOXYHEMOGLOBIN: 1.1 % (ref 0–1.5)
CHLORIDE BLD-SCNC: 95 MMOL/L (ref 99–110)
CO2: 33 MMOL/L (ref 21–32)
CREAT SERPL-MCNC: <0.5 MG/DL (ref 0.6–1.2)
EOSINOPHILS ABSOLUTE: 0 K/UL (ref 0–0.6)
EOSINOPHILS RELATIVE PERCENT: 0.7 %
GFR AFRICAN AMERICAN: >60
GFR NON-AFRICAN AMERICAN: >60
GLUCOSE BLD-MCNC: 107 MG/DL (ref 70–99)
GLUCOSE BLD-MCNC: 115 MG/DL (ref 70–99)
GLUCOSE BLD-MCNC: 80 MG/DL (ref 70–99)
HCO3 ARTERIAL: 31.9 MMOL/L (ref 21–29)
HCO3 VENOUS: 31.5 MMOL/L (ref 23–29)
HCT VFR BLD CALC: 33.6 % (ref 36–48)
HEMOGLOBIN, ART, EXTENDED: 12 G/DL (ref 12–16)
HEMOGLOBIN: 11 G/DL (ref 12–16)
LYMPHOCYTES ABSOLUTE: 0.8 K/UL (ref 1–5.1)
LYMPHOCYTES RELATIVE PERCENT: 13.5 %
MCH RBC QN AUTO: 25.4 PG (ref 26–34)
MCHC RBC AUTO-ENTMCNC: 32.7 G/DL (ref 31–36)
MCV RBC AUTO: 77.6 FL (ref 80–100)
METHEMOGLOBIN ARTERIAL: 0.3 %
METHEMOGLOBIN VENOUS: 0.3 %
MONOCYTES ABSOLUTE: 0.4 K/UL (ref 0–1.3)
MONOCYTES RELATIVE PERCENT: 6.1 %
NEUTROPHILS ABSOLUTE: 4.7 K/UL (ref 1.7–7.7)
NEUTROPHILS RELATIVE PERCENT: 79.4 %
O2 CONTENT ARTERIAL: 16 ML/DL
O2 CONTENT, VEN: 16 VOL %
O2 SAT, ARTERIAL: 95.8 %
O2 SAT, VEN: 99 %
O2 THERAPY: ABNORMAL
O2 THERAPY: ABNORMAL
PCO2 ARTERIAL: 39.3 MMHG (ref 35–45)
PCO2, VEN: 42.7 MMHG (ref 40–50)
PDW BLD-RTO: 32.3 % (ref 12.4–15.4)
PERFORMED ON: ABNORMAL
PERFORMED ON: NORMAL
PH ARTERIAL: 7.53 (ref 7.35–7.45)
PH VENOUS: 7.49 (ref 7.35–7.45)
PLATELET # BLD: 155 K/UL (ref 135–450)
PMV BLD AUTO: 8.6 FL (ref 5–10.5)
PO2 ARTERIAL: 71.1 MMHG (ref 75–108)
PO2, VEN: 125.3 MMHG (ref 25–40)
POTASSIUM SERPL-SCNC: 3.7 MMOL/L (ref 3.5–5.1)
RBC # BLD: 4.33 M/UL (ref 4–5.2)
SODIUM BLD-SCNC: 132 MMOL/L (ref 136–145)
TCO2 ARTERIAL: 33.1 MMOL/L
TCO2 CALC VENOUS: 33 MMOL/L
WBC # BLD: 5.9 K/UL (ref 4–11)

## 2020-12-14 PROCEDURE — 94640 AIRWAY INHALATION TREATMENT: CPT

## 2020-12-14 PROCEDURE — 94003 VENT MGMT INPAT SUBQ DAY: CPT

## 2020-12-14 PROCEDURE — 80048 BASIC METABOLIC PNL TOTAL CA: CPT

## 2020-12-14 PROCEDURE — 2700000000 HC OXYGEN THERAPY PER DAY

## 2020-12-14 PROCEDURE — 82803 BLOOD GASES ANY COMBINATION: CPT

## 2020-12-14 PROCEDURE — 85025 COMPLETE CBC W/AUTO DIFF WBC: CPT

## 2020-12-14 PROCEDURE — 2580000003 HC RX 258: Performed by: INTERNAL MEDICINE

## 2020-12-14 PROCEDURE — 94750 HC PULMONARY COMPLIANCE STUDY: CPT

## 2020-12-14 PROCEDURE — 6370000000 HC RX 637 (ALT 250 FOR IP): Performed by: INTERNAL MEDICINE

## 2020-12-14 PROCEDURE — 6360000002 HC RX W HCPCS: Performed by: INTERNAL MEDICINE

## 2020-12-14 PROCEDURE — 36592 COLLECT BLOOD FROM PICC: CPT

## 2020-12-14 PROCEDURE — 36600 WITHDRAWAL OF ARTERIAL BLOOD: CPT

## 2020-12-14 PROCEDURE — 94669 MECHANICAL CHEST WALL OSCILL: CPT

## 2020-12-14 PROCEDURE — 71045 X-RAY EXAM CHEST 1 VIEW: CPT

## 2020-12-14 PROCEDURE — 2000000000 HC ICU R&B

## 2020-12-14 PROCEDURE — 94761 N-INVAS EAR/PLS OXIMETRY MLT: CPT

## 2020-12-14 PROCEDURE — 99233 SBSQ HOSP IP/OBS HIGH 50: CPT | Performed by: INTERNAL MEDICINE

## 2020-12-14 PROCEDURE — C9113 INJ PANTOPRAZOLE SODIUM, VIA: HCPCS | Performed by: INTERNAL MEDICINE

## 2020-12-14 PROCEDURE — 99291 CRITICAL CARE FIRST HOUR: CPT | Performed by: INTERNAL MEDICINE

## 2020-12-14 RX ORDER — DEXAMETHASONE 1 MG
3 TABLET ORAL DAILY
Status: DISCONTINUED | OUTPATIENT
Start: 2020-12-15 | End: 2020-12-16

## 2020-12-14 RX ADMIN — CARBOXYMETHYLCELLULOSE SODIUM 1 DROP: 10 GEL OPHTHALMIC at 04:26

## 2020-12-14 RX ADMIN — DEXAMETHASONE 4 MG: 4 TABLET ORAL at 08:29

## 2020-12-14 RX ADMIN — SODIUM CHLORIDE 30 MG/ML INHALATION SOLUTION 4 ML: 30 SOLUTION INHALANT at 07:45

## 2020-12-14 RX ADMIN — WHITE PETROLATUM 57.7 %-MINERAL OIL 31.9 % EYE OINTMENT: at 00:05

## 2020-12-14 RX ADMIN — FOLIC ACID 1 MG: 1 TABLET ORAL at 08:29

## 2020-12-14 RX ADMIN — PROPOFOL 30 MCG/KG/MIN: 10 INJECTION, EMULSION INTRAVENOUS at 08:41

## 2020-12-14 RX ADMIN — IPRATROPIUM BROMIDE AND ALBUTEROL SULFATE 1 AMPULE: .5; 3 SOLUTION RESPIRATORY (INHALATION) at 07:45

## 2020-12-14 RX ADMIN — PANTOPRAZOLE SODIUM 40 MG: 40 INJECTION, POWDER, FOR SOLUTION INTRAVENOUS at 08:29

## 2020-12-14 RX ADMIN — Medication 15 ML: at 08:29

## 2020-12-14 RX ADMIN — ENOXAPARIN SODIUM 30 MG: 30 INJECTION SUBCUTANEOUS at 08:29

## 2020-12-14 RX ADMIN — Medication 10 ML: at 20:06

## 2020-12-14 RX ADMIN — ACETYLCYSTEINE 600 MG: 200 SOLUTION ORAL; RESPIRATORY (INHALATION) at 11:46

## 2020-12-14 RX ADMIN — CARBOXYMETHYLCELLULOSE SODIUM 1 DROP: 10 GEL OPHTHALMIC at 06:04

## 2020-12-14 RX ADMIN — WHITE PETROLATUM 57.7 %-MINERAL OIL 31.9 % EYE OINTMENT: at 08:29

## 2020-12-14 RX ADMIN — WHITE PETROLATUM 57.7 %-MINERAL OIL 31.9 % EYE OINTMENT: at 04:26

## 2020-12-14 RX ADMIN — Medication 10 ML: at 08:29

## 2020-12-14 ASSESSMENT — PULMONARY FUNCTION TESTS
PIF_VALUE: 22
PIF_VALUE: 18

## 2020-12-14 NOTE — PROGRESS NOTES
Shift assessment complete. SBT and SAT started. Patient tolerating okay thus far. Propofol infusing at 15 mcg/kg/min. TF stopped for SBT. RASS 0. Cachetic appearance. Severely malnourished. BUE soft wrist restraints in place due to attempts of pulling at ETT and lines. No signs of injury noted and PROM performed. Central line dressing is clean, dry and intact with no signs of drainage. All tubing is current and dated. IPA caps applied to all access hubs.      Electronically signed by Damaris Jasso RN on 12/14/2020 at 8:49 AM    Vitals:    12/14/20 0800   BP: 134/80   Pulse: 78   Resp: 22   Temp: 96.6 °F (35.9 °C)   SpO2: 100%

## 2020-12-14 NOTE — PROGRESS NOTES
Patient reassessed. VBG pending for possible extubation. BUE soft wrist restraints in place due to attempts of pulling at ETT and lines. No signs of injury noted and PROM performed. Central line dressing is clean, dry and intact with no signs of drainage. All tubing is current and dated. IPA caps applied to all access hubs.      Electronically signed by Roe Weinberg RN on 12/14/2020 at 11:23 AM    Vitals:    12/14/20 1100   BP: 135/83   Pulse: 79   Resp: 21   Temp: 97.1 °F (36.2 °C)   SpO2: 100%

## 2020-12-14 NOTE — PROGRESS NOTES
Patient reassessed. Patient is currently on VAPOTHERM at 20 LPM and 30% FiO2. Tolerating well. Speech is clear but hoarse. I would consider it as moderately strong.     Electronically signed by Isaac Talbot RN on 12/14/2020 at 2:58 PM

## 2020-12-14 NOTE — PROGRESS NOTES
12/14/20 0324   Vent Information   Vent Type 840   Vent Mode AC/VC   Vt Ordered 290 mL   Rate Set 20 bmp   Peak Flow 50 L/min   FiO2  21 %   SpO2 99 %   SpO2/FiO2 ratio 471.43   Sensitivity 3   PEEP/CPAP 5   Humidification Source Heated wire   Humidification Temp 37   Humidification Temp Measured 36.7   Circuit Condensation Drained   Vent Patient Data   Peak Inspiratory Pressure 22 cmH2O   Mean Airway Pressure 10 cmH20   Rate Measured 20 br/min   Vt Exhaled 288 mL   Minute Volume 6.08 Liters   I:E Ratio 1:3.3   Cough/Sputum   Sputum How Obtained Endotracheal;Suctioned   Cough Non-productive   Sputum Amount None   Sputum Color None   Tenacity None   Spontaneous Breathing Trial (SBT) RT Doc   Pulse 88   Breath Sounds   Right Upper Lobe Diminished   Right Middle Lobe Diminished   Right Lower Lobe Diminished   Left Upper Lobe Diminished   Left Lower Lobe Diminished   Additional Respiratory  Assessments   Resp 20   Position Semi-Solano's   Alarm Settings   High Pressure Alarm 45 cmH2O   Low Minute Volume Alarm 2.5 L/min   Apnea (secs) 20 secs   High Respiratory Rate 40 br/min   Low Exhaled Vt  150 mL   Patient Observation   Observations ETT SIZE 7.0, SECURED AT 23 LIP LINE. AMBU BAG AT HEAD OF BED. WATER GOOD.     Non-Surgical Airway   Placement Date/Time: 12/12/20 1510   Timeout: Patient;Procedure;Site/Side;Appropriate Equipment  Placed By: Licensed provider  Inserted by: Dr Evelyn Mccall  Size: 7.5   Secured at 23 cm   Measured From [address] By [employer] tube gale   Site Condition Dry

## 2020-12-14 NOTE — PROGRESS NOTES
12/13/20 1922   Vent Information   $Ventilation $Subsequent Day   Vent Type 840   Vent Mode AC/VC   Vt Ordered 290 mL   Rate Set 20 bmp   Peak Flow 50 L/min   FiO2  30 %   SpO2 99 %   SpO2/FiO2 ratio 330   Sensitivity 3   PEEP/CPAP 5   Humidification Source Heated wire   Humidification Temp 37   Humidification Temp Measured 36.9   Vent Patient Data   Peak Inspiratory Pressure 29 cmH2O   Mean Airway Pressure 11 cmH20   Rate Measured 24 br/min   Vt Exhaled 305 mL   Minute Volume 7.22 Liters   I:E Ratio 1:1.8   Plateau Pressure 12 [id number]   Static Compliance 44 mL/cmH2O   Dynamic Compliance 13 mL/cmH2O   Cough/Sputum   Sputum How Obtained Suctioned;Endotracheal   Cough Productive   Sputum Amount Small   Sputum Color Creamy   Tenacity Thick   Spontaneous Breathing Trial (SBT) RT Doc   Pulse 104   Breath Sounds   Right Upper Lobe Diminished   Right Middle Lobe Diminished   Right Lower Lobe Diminished   Left Upper Lobe Diminished   Left Lower Lobe Diminished   Additional Respiratory  Assessments   Resp 19   Position Semi-Solano's   Oral Care Completed? Yes   Oral Care Mouth suctioned   Subglottic Suction Done?  Yes   Cuff Pressure (cm H2O)   (MOV)   Alarm Settings   High Pressure Alarm 45 cmH2O   Low Minute Volume Alarm 4 L/min   Apnea (secs) 20 secs   High Respiratory Rate 40 br/min   Low Exhaled Vt  200 mL   Non-Surgical Airway   Placement Date/Time: 12/12/20 1510   Timeout: Patient;Procedure;Site/Side;Appropriate Equipment  Placed By: Licensed provider  Inserted by: Dr Cassie Graham  Size: 7.5   Secured at 23 cm   Measured From Lips   Secured Location Right   Secured By [employer] tube gale   Site Condition Dry

## 2020-12-14 NOTE — PROGRESS NOTES
Hospitalist Progress Note      PCP: No primary care provider on file. Date of Admission: 12/1/2020     Admitted with Covid pneumonia, acute respiratory failure and altered mental status    S/p intubation and mechanical ventilation 12/1-> 12/10  reintubated on 12/12/2020 and extubated today 1214    Subjective:   1214  Patient has been extubated today  On FiO2 30% Vapotherm 20 L  She is very cachectic,  awake responding    Medications:  Reviewed    Infusion Medications    propofol Stopped (12/14/20 1153)    norepinephrine      dextrose       Scheduled Medications    [START ON 12/15/2020] dexamethasone  3 mg Oral Daily    enoxaparin  30 mg Subcutaneous Daily    chlorhexidine  15 mL Mouth/Throat BID    pantoprazole  40 mg Intravenous Daily    sodium chloride flush  10 mL Intravenous 2 times per day    sodium chloride  20 mL Intravenous Once    folic acid  1 mg Oral Daily     PRN Meds: fentanNYL, albuterol sulfate HFA **AND** ipratropium **AND** MDI Treatment, midazolam, norepinephrine, iopamidol, ipratropium-albuterol, sodium chloride, sodium chloride flush, acetaminophen **OR** acetaminophen, polyethylene glycol, promethazine **OR** ondansetron, glucose, dextrose, glucagon (rDNA), dextrose, potassium chloride **OR** potassium alternative oral replacement **OR** potassium chloride, magnesium sulfate      Intake/Output Summary (Last 24 hours) at 12/14/2020 1654  Last data filed at 12/14/2020 1459  Gross per 24 hour   Intake 1166 ml   Output 1270 ml   Net -104 ml       Physical Exam Performed:    /76   Pulse 81   Temp 97.1 °F (36.2 °C) (Bladder)   Resp 19   Ht 5' 2\" (1.575 m)   Wt 63 lb 11.2 oz (28.9 kg)   SpO2 97%   BMI 11.65 kg/m²     General: thin cachetic.   Extubated now awake, on O2 per Vapotherm  Mucous Membranes:  Pink , anicteric  Neck: No JVD, no carotid bruit, no thyromegaly  Chest:  diminished b/l  Cardiovascular:  RRR S1S2 heard, no murmurs or gallops  Abdomen:  Soft, undistended, non tender, no organomegaly, BS present  Extremities: left UE with abnormal bony projections  Severe muscle wasting of all ext  No edema or cyanosis. Distal pulses well felt  Neurological : awake nonfocal       Labs:   Recent Labs     12/12/20  0930 12/13/20  0435 12/14/20  0428   WBC 10.9 6.2 5.9   HGB 13.3 11.7* 11.0*   HCT 41.3 36.8 33.6*    200 155     Recent Labs     12/12/20  0930 12/13/20  0435 12/14/20  0428   * 136 132*   K 4.5 3.3* 3.7   CL 96* 99 95*   CO2 31 30 33*   BUN 17 27* 24*   CREATININE <0.5* <0.5* <0.5*   CALCIUM 9.2 8.4 8.7     No results for input(s): AST, ALT, BILIDIR, BILITOT, ALKPHOS in the last 72 hours. No results for input(s): INR in the last 72 hours. No results for input(s): Michael Hug in the last 72 hours. Urinalysis:      Lab Results   Component Value Date    NITRU Negative 12/01/2020    WBCUA 3-5 12/01/2020    BACTERIA 1+ 12/01/2020    RBCUA 0-2 12/01/2020    BLOODU Negative 12/01/2020    SPECGRAV 1.020 12/01/2020    GLUCOSEU Negative 12/01/2020       Radiology:  XR CHEST PORTABLE   Final Result   Relatively stable appearance of the chest.  Lucent, hyperinflated lungs   suggest COPD. Blunting versus trace right pleural effusion at the right   costophrenic sulcus. XR CHEST PORTABLE   Final Result   Possible small right pleural effusion. CT CHEST PULMONARY EMBOLISM W CONTRAST   Final Result   1. Small bilateral pleural effusions, right greater than left. XR CHEST PORTABLE   Final Result   1. Endotracheal tube in satisfactory position above the ryley   2. NG tip in gastric fundus and side port near GE junction   3. Otherwise, stable chest         XR CHEST PORTABLE   Final Result   Stable chest demonstrating bilateral pleural effusions. XR CHEST PORTABLE   Final Result   No significant change in appearance of the chest over the past 24 hours with   persistent diffuse bilateral pulmonary disease and trace pleural fluid.    There also is diffuse bowel dilation suggesting ileus         XR CHEST PORTABLE   Final Result   Essentially stable exam with probable pulmonary emphysema, and suspected   small pleural effusions with bibasilar atelectasis         XR CHEST PORTABLE   Final Result   Persistent small bilateral pleural effusions. XR CHEST PORTABLE   Final Result   No significant interval change. XR CHEST PORTABLE   Final Result   Stable exam.         XR CHEST PORTABLE   Final Result   Supportive tubing projects in normal position. Persistent pattern of pulmonary edema. Pneumonia remains a differential   possibility. Small right pleural effusion. IR PICC WO SQ PORT/PUMP > 5 YEARS   Final Result      XR CHEST PORTABLE   Final Result   Stable lines and catheters. Persistent multifocal interstitial opacities most pronounced both lung bases. Slight improved aeration of the upper and mid lungs. XR CHEST PORTABLE   Final Result   Worsening airspace opacities especially in the bases. XR CHEST PORTABLE   Final Result   1. Stable appropriate positions of support apparatus. 2. Stable multifocal pneumonia. CT HEAD WO CONTRAST   Final Result   No acute intracranial finding. MRI may be obtained if clinically indicated. XR CHEST PORTABLE   Final Result   Patient is intubated with tubes seen as above. Hyperinflated lungs. No   overt consolidation. Fracture deformity of the left proximal humerus is   incompletely imaged. Recommend comparison with clinical exam and follow-up   with dedicated views. XR CHEST PORTABLE   Final Result   Technically limited study performed portably with arms overlying the lower   chest.  Asymmetric ground-glass opacification appears present in the right   lower lung zone which may represent edema or developing pneumonitis.          XR CHEST PORTABLE    (Results Pending)           Assessment/Plan:    Active Hospital Problems    Diagnosis    hypokalemia   resolved     # Acute blood loss anemia, on chronic anemia  History of peptic ulcer disease  -Gastric ulcer noted on EGD in 2013  -Start IV PPI bid , h.h dropped from 8.7 to 6.5, partly with IV fluid boluses   - s.p 1 unit pRBC   - iron studies with folate and iron def, started supplements   Hg 6.9  - transfused 2  unit of red cells   Hb 12.3-->13.3--11. 7     #Left distal humerus fracture 2018  -Chronic deformity of the left upper extremity     #Reported history of alcohol abuse  -Unclear if she is still consuming alcohol.  Checked an ethanol level- negative     #Severe PCM  - extreme muscle wasting.    -  dietitian eval .  Continue tube feeds  - checked TSH wnl  -Patient's weight has increased with continued tube feeds since admission       DVT Prophylaxis: Lovenox qd- given low weight   Diet: DIET TUBE FEED CONTINUOUS/CYCLIC NPO; Low Calorie High Protein (Vital High-Protein);  Orogastric; Continuous; 10; 30; 20  Code Status: Full Code      Dhaval Swanson MD

## 2020-12-14 NOTE — CARE COORDINATION
INTERDISCIPLINARY PLAN OF CARE CONFERENCE    Date/Time: 12/14/2020 10:08 AM  Completed by: Rusty Noland Management      Patient Name:  Rama Lambert  YOB: 1950  Admitting Diagnosis: Failure to thrive in adult [R62.7]  Acute respiratory failure with hypoxia ([facility] [address] [J96.01]     Admit Date/Time:  12/1/2020  5:50 AM    Chart reviewed. Interdisciplinary team contacted or reviewed plan related to patient progress and discharge plans. Disciplines included Case Management, Nursing, and Dietitian. Current Status:inpt  PT/OT recommendation for discharge plan of care: hold    Expected D/C Disposition:  tbd    Discharge Plan Comments: Reviewed chart. Pt in ICU. SBT this AM. C-19 positive. Will need PT/OT rec when appropriate.  following    Home O2 in place on admit: No  Pt informed of need to bring portable home O2 tank on day of discharge for nursing to connect prior to leaving:  Not Indicated  Verbalized agreement/Understanding:  Not Indicated

## 2020-12-14 NOTE — PROGRESS NOTES
ondansetron, glucose, dextrose, glucagon (rDNA), dextrose, potassium chloride **OR** potassium alternative oral replacement **OR** potassium chloride, magnesium sulfate    Results:  CBC:   Recent Labs     12/12/20  0930 12/13/20  0435 12/14/20  0428   WBC 10.9 6.2 5.9   HGB 13.3 11.7* 11.0*   HCT 41.3 36.8 33.6*   MCV 77.8* 78.7* 77.6*    200 155     BMP:   Recent Labs     12/12/20  0930 12/13/20  0435 12/14/20  0428   * 136 132*   K 4.5 3.3* 3.7   CL 96* 99 95*   CO2 31 30 33*   BUN 17 27* 24*   CREATININE <0.5* <0.5* <0.5*       Cultures:  12/1/2020 SARS-CoV-2 positive  12/2/2020 respiratory culture NRF  12/2/2020 urine no growth  12/12/2020 tracheal aspirate sent    Films:  CXR 12/8/2020 ETT okay  CXR 12/12/2020 small bilateral effusions, largely unchanged     ASSESSMENT:  · Acute hypoxemic respiratory failure -hyper acutely worsened today, from 1.5 L to nonrebreather. Favor inability to clear thick secretions. · COVID-19 pneumonia  · Cachexia/failure to thrive  · Acute metabolic encephalopathy superimposed on underlying dementia  · Acute anemia -S/P 2 units PRBC  · Possible brief asystolic arrest early am 15/0/85 with unclear etiology - 30 seconds before ROSC     PLAN:  Extubate to vapotherm  Mechanical ventilation as per my orders. The ventilator was adjusted by me at the bedside for unstable, life threatening respiratory failure. Decrease rate   · IV Propofol for sedation  · D#12 Decadron -now 4 mg - dec to 3mg  · Completed 5 days Remdesevir; s/p 1 U CCP 12/3/2020  · Completed 7 days ceftriaxone and 5 days azithromycin  · S/P 3 U PRBC total  · Will need to consider tracheostomy tube and PEG placement given inability to handle secretions.    · Hypertonic nebs and Mucomyst nebs   · ICU care -start lovenox and follow hemoglobin; on BID protonix  · MINERVA Garcia (son)    · Total critical care time caring for this patient with life threatening, unstable organ failure, including direct patient contact, management of life support systems, review of data including imaging and labs, discussions with other team members and physicians is 32 minutes so far today, excluding procedures.

## 2020-12-14 NOTE — PROGRESS NOTES
Patient report given to [facility]. Patient has rested comfortably on ventilator overnight. Propofol continues at 40mcg/kg/min. Care transferred.

## 2020-12-14 NOTE — PROGRESS NOTES
Speech Language Pathology  Spoke with Wesley/RN re: pt status as pt was transferred from floor to ICU and intubated. Pt now extubated. Wesley/RN placed order for ST claraal for tomorrow for assessment for potential to return to po intake. Lisette Severino. Dinh Liu M.A.  [id number] [facility] [place]  Speech-Language Pathologist

## 2020-12-14 NOTE — PROGRESS NOTES
Patient care assumed, assessment completed as charted. Patient continues on ventilator, #7.5 at the 23 lip line. A/C 28, , PEEP 5, FiO2 40%. Right upper arm PICC intact with Propofol infusing at 50mcg/kg/min. OG in place with tube feed running at 30mL/hr, sandoval catheter patent, bilateral soft wrist restraints in place for patient safety. No further needs assessed at this time. Will monitor.

## 2020-12-15 ENCOUNTER — APPOINTMENT (OUTPATIENT)
Dept: GENERAL RADIOLOGY | Age: 70
DRG: 870 | End: 2020-12-15
Payer: MEDICARE

## 2020-12-15 LAB
ANION GAP SERPL CALCULATED.3IONS-SCNC: 9 MMOL/L (ref 3–16)
BASOPHILS ABSOLUTE: 0.1 K/UL (ref 0–0.2)
BASOPHILS RELATIVE PERCENT: 1 %
BUN BLDV-MCNC: 12 MG/DL (ref 7–20)
CALCIUM SERPL-MCNC: 8.7 MG/DL (ref 8.3–10.6)
CHLORIDE BLD-SCNC: 99 MMOL/L (ref 99–110)
CO2: 28 MMOL/L (ref 21–32)
CREAT SERPL-MCNC: <0.5 MG/DL (ref 0.6–1.2)
EOSINOPHILS ABSOLUTE: 0.1 K/UL (ref 0–0.6)
EOSINOPHILS RELATIVE PERCENT: 0.9 %
GFR AFRICAN AMERICAN: >60
GFR NON-AFRICAN AMERICAN: >60
GLUCOSE BLD-MCNC: 70 MG/DL (ref 70–99)
GLUCOSE BLD-MCNC: 78 MG/DL (ref 70–99)
GLUCOSE BLD-MCNC: 80 MG/DL (ref 70–99)
GLUCOSE BLD-MCNC: 85 MG/DL (ref 70–99)
GLUCOSE BLD-MCNC: 87 MG/DL (ref 70–99)
HCT VFR BLD CALC: 35.5 % (ref 36–48)
HEMOGLOBIN: 11.5 G/DL (ref 12–16)
LYMPHOCYTES ABSOLUTE: 0.6 K/UL (ref 1–5.1)
LYMPHOCYTES RELATIVE PERCENT: 9.5 %
MCH RBC QN AUTO: 25.1 PG (ref 26–34)
MCHC RBC AUTO-ENTMCNC: 32.4 G/DL (ref 31–36)
MCV RBC AUTO: 77.6 FL (ref 80–100)
MONOCYTES ABSOLUTE: 0.4 K/UL (ref 0–1.3)
MONOCYTES RELATIVE PERCENT: 6.3 %
NEUTROPHILS ABSOLUTE: 5.2 K/UL (ref 1.7–7.7)
NEUTROPHILS RELATIVE PERCENT: 82.3 %
PDW BLD-RTO: 31.6 % (ref 12.4–15.4)
PERFORMED ON: NORMAL
PLATELET # BLD: 163 K/UL (ref 135–450)
PMV BLD AUTO: 8.8 FL (ref 5–10.5)
POTASSIUM SERPL-SCNC: 3.6 MMOL/L (ref 3.5–5.1)
RBC # BLD: 4.58 M/UL (ref 4–5.2)
SODIUM BLD-SCNC: 136 MMOL/L (ref 136–145)
WBC # BLD: 6.4 K/UL (ref 4–11)

## 2020-12-15 PROCEDURE — 2060000000 HC ICU INTERMEDIATE R&B

## 2020-12-15 PROCEDURE — C9113 INJ PANTOPRAZOLE SODIUM, VIA: HCPCS | Performed by: INTERNAL MEDICINE

## 2020-12-15 PROCEDURE — 92526 ORAL FUNCTION THERAPY: CPT

## 2020-12-15 PROCEDURE — 99232 SBSQ HOSP IP/OBS MODERATE 35: CPT | Performed by: INTERNAL MEDICINE

## 2020-12-15 PROCEDURE — 99233 SBSQ HOSP IP/OBS HIGH 50: CPT | Performed by: INTERNAL MEDICINE

## 2020-12-15 PROCEDURE — 80048 BASIC METABOLIC PNL TOTAL CA: CPT

## 2020-12-15 PROCEDURE — 36592 COLLECT BLOOD FROM PICC: CPT

## 2020-12-15 PROCEDURE — 6360000002 HC RX W HCPCS: Performed by: INTERNAL MEDICINE

## 2020-12-15 PROCEDURE — 37799 UNLISTED PX VASCULAR SURGERY: CPT

## 2020-12-15 PROCEDURE — 2580000003 HC RX 258: Performed by: INTERNAL MEDICINE

## 2020-12-15 PROCEDURE — 85025 COMPLETE CBC W/AUTO DIFF WBC: CPT

## 2020-12-15 RX ADMIN — ENOXAPARIN SODIUM 30 MG: 30 INJECTION SUBCUTANEOUS at 08:12

## 2020-12-15 RX ADMIN — Medication 10 ML: at 08:12

## 2020-12-15 RX ADMIN — PANTOPRAZOLE SODIUM 40 MG: 40 INJECTION, POWDER, FOR SOLUTION INTRAVENOUS at 08:12

## 2020-12-15 RX ADMIN — Medication 10 ML: at 23:03

## 2020-12-15 NOTE — PROGRESS NOTES
Patient off unit with transport to surge overflow with all personal belongings. No s/s of distress at time of transfer.

## 2020-12-15 NOTE — PROGRESS NOTES
Comprehensive Nutrition Assessment    Type and Reason for Visit:  Reassess    Nutrition Recommendations/Plan:   1. Continue NPO status and monitor for po diet advancement with appropriate po diet consistencies recommended by SLP. 2. Recommend alternate means of nutrition if patient cannot pass SLP evaluation and progress to po diet order. 3. Monitor mental status, appetite, and po intake (when/if diet is advanced). 4. Please obtain daily weights for this patient. 5. Monitor nutrition-related labs, bowel function, and weight trends. Nutrition Assessment:  patient continues to decline from a nutritional standpoint AEB need for SLP evaluation, refusal of po nutrition, and EN has been d/c'd since patient no longer has a feeding tube and she remains at risk for further compromise d/t BMI = 10.72, NPO status, and and patient is oriented to person only at this time; will continue NPO status until patient is evaluated by SLP and consistency recommendations are provided    Malnutrition Assessment:  Malnutrition Status:  Severe malnutrition    Context:  Acute Illness     Findings of the 6 clinical characteristics of malnutrition:  Energy Intake:  7 - 50% or less of estimated energy requirements for 5 or more days  Weight Loss:  No significant weight loss     Body Fat Loss:  7 - Moderate body fat loss(observed through patient's room window since patient has COVID-19) Orbital, Buccal region   Muscle Mass Loss:  7 - Moderate muscle mass loss(observed through patient's room window since patient has COVID-19) Temples (temporalis)  Fluid Accumulation:  1 - Mild Extremities(+ 2 pitting edema in LUE)   Strength:  Not Performed    Estimated Daily Nutrient Needs:  Energy (kcal):  728 - 832 kcals based on 28-32 kcals/kg/CBW; Weight Used for Energy Requirements:  Current     Protein (g):  44 - 47 g protein based on 1.7-1.8 g/kg/CBW;  Weight Used for Protein Requirements:  Current        Fluid (ml/day):  728 - 832 ml; Method Used for Fluid Requirements:  1 ml/kcal      Nutrition Related Findings:  patient is oriented to person only at this time; she is NPO - awaiting SLP evaluation to determine appropriate po consistencies for patient; patient was refusing po nutrition when last RD assessment was conducted; abdomen is flat, soft, and bowel sounds are hypoactive; + BM on 12/12/20; patient is receiving protonix, decadron, and folic acid; + 2 pitting edema in LUE      Wounds:  None       Current Nutrition Therapies:    Diet NPO Effective Now    Anthropometric Measures:  · Height: 5' 2\" (157.5 cm)  · Current Body Weight: 58 lb 9.6 oz (26.6 kg)(obtained on 12/15/20)   · Admission Body Weight: 58 lb 10.3 oz (26.6 kg)(actual weight; obtained on 12/1/20)    · Usual Body Weight: 58 lb 10.3 oz (26.6 kg)(actual weight; obtained on 12/1/20)     · Ideal Body Weight: 110 lbs; % Ideal Body Weight 53.3 %   · BMI: 10.7   · BMI Categories: Underweight (BMI less than 22) age over 72       Nutrition Diagnosis:   · Severe malnutrition related to inadequate protein-energy intake, impaired respiratory function, cognitive or neurological impairment as evidenced by intake 0-25%, poor intake prior to admission, severe loss of subcutaneous fat, severe muscle loss, swallow study results, BMI      Nutrition Interventions:   Food and/or Nutrient Delivery:  Continue NPO  Nutrition Education/Counseling:  No recommendation at this time   Coordination of Nutrition Care:  Continue to monitor while inpatient, Speech Therapy, Swallow Evaluation    Goals:  patient will adhere to NPO status until she is medically cleared/cleared by SLP for po diet advancement with appropriate po diet consistencies       Nutrition Monitoring and Evaluation:   Behavioral-Environmental Outcomes:  None Identified   Food/Nutrient Intake Outcomes:  Diet Advancement/Tolerance  Physical Signs/Symptoms Outcomes:  Biochemical Data, Chewing or Swallowing, Fluid Status or Edema, Hemodynamic Status, Nutrition Focused Physical Findings, Skin, Weight     Discharge Planning:     Too soon to determine     Electronically signed by Khalif Grubbs RD, LD on 12/15/20 at 12:25 PM EST    Contact: [phone]

## 2020-12-15 NOTE — PROGRESS NOTES
4 Eyes Skin Assessment     The patient is being assess for   Transfer to New Unit    I agree that 2 RN's have performed a thorough Head to Toe Skin Assessment on the patient. ALL assessment sites listed below have been assessed. Areas assessed by both nurses:   [x]   Head, Face, and Ears   [x]   Shoulders, Back, and Chest, Abdomen  [x]   Arms, Elbows, and Hands   [x]   Coccyx, Sacrum, and Ischium  [x]   Legs, Feet, and Heels        mepliex to all arleth prominences as preventative. Bilateral forearms are red but blanchable. DTI on coccyx and redness    **SHARE this note so that the co-signing nurse is able to place an eSignature**    Co-signer eSignature: Electronically signed by Teresa Duffy RN on 12/15/20 at 3:13 PM EST    Does the Patient have Skin Breakdown?   Yes LDA WOUND CARE was Initiated documentation include the Kaleigh-wound, Wound Assessment, Measurements, Dressing Treatment, Drainage, and Color\",          Yogi Prevention initiated:  Yes   Wound Care Orders initiated:  Yes      [id number]  nurse consulted for Pressure Injury (Stage 3,4, Unstageable, DTI, NWPT, Complex wounds)and New or Established Ostomies:  No      Primary Nurse eSignature: Electronically signed by Becca Mcarthur RN on 12/15/20 at 3:07 PM EST

## 2020-12-15 NOTE — PROGRESS NOTES
Shift assessment complete. No labored breathing. Patient has extremity deformities increasing risk of skin breakdown. mepilex on all points. DTI on coccyx. Vital signs stable on room air.

## 2020-12-15 NOTE — PLAN OF CARE
Nutrition Problem #1: Severe malnutrition  Intervention: Food and/or Nutrient Delivery: Continue NPO  Nutritional Goals: patient will adhere to NPO status until she is medically cleared/cleared by SLP for po diet advancement with appropriate po diet consistencies

## 2020-12-15 NOTE — PROGRESS NOTES
Pt awake in bed, VSS. Denies SOB or pain. Pt alert but only oriented to self. Bilateral lung sounds are diminished. Pt requesting to get up to bedside- attempted bed pan no BM recorded. Pt repeatedly taking out N.C. and has to be redirected. Assessment complete see doc flow. NSR on tele, SPO2 98% on 20L O2 via NC with CSPO2 monitoring. BS only 80- will recheck at midnight. Pt educated not to get oob without assistance, pt verbalized understanding. Pt repositioned for comfort. Call light within reach will continue to monitor.

## 2020-12-15 NOTE — PROGRESS NOTES
Speech Language Pathology Re-assessment  Facility/Department: [facility]  Dysphagia Daily Treatment Note/RE-ASSESSMENT    NAME: Bruce Sofia  : 1950  MRN: [id number]    Patient Diagnosis(es):   Patient Active Problem List    Diagnosis Date Noted    Acute respiratory failure with hypoxia and hypercapnia ([facility] [address]     Severe protein-calorie malnutrition Lang Sailors: less than 60% of standard weight) ([address] 2020    Anorexia     Acute metabolic encephalopathy 6725    Pneumonia due to COVID-19 virus 2020    Acute respiratory failure with hypoxemia ([address] 2020    Acute respiratory failure with hypoxia ([facility] [address] 2020    COVID-19 virus detected     Hyperkalemia     Sepsis with acute hypercapnic respiratory failure without septic shock (HCC)     Closed fracture of left proximal humerus 2018    Closed fracture of left distal humerus 2018    Pulmonary nodules 2017    Dementia ([address] 2013    Gastric ulcer 2013    BELKYS (acute kidney injury) ([facility] [address] 2013    Failure to thrive 2013    Alcohol abuse 2013     Allergies: Allergies   Allergen Reactions    No Known Allergies      Onset Date: The patient was admitted to [facility] on 2020  Subjective: The patient is seen in room at bedside with RN permission. Pt is on overflow surge COVID unit. Alert with confusion. Oriented to self only. She states she is feeling \"pretty good\". Pain: The patient does not complain of pain     Current Diet: Diet NPO Effective Now    Diet Tolerance:  Pt NPO    P.O. Trials: Thin   x X 3 ice chips  X 2 cup sips  X 1 straw sip   Nectar / Mildly Thick       Honey / Moderately Thick       Pudding / Extremely Thick       Puree   x X 3 1/2 tsp's of puree   Solid         Dysphagia Treatment and Impressions:  Pt was previously on caseload and seen for initial evaluation on 12/10.  Eval recommendations indicated IDDSI 1 milldy thick (nectar) liquids and IDDSI 4 puree solids. The patient was noted with worsening respiratory demands following and was later re-intubated from 12/12-12/14. SLP re-assessing pt this date. HOB elevated per SLP to high stewart position. Results to follow:    Vitals/labs:   Temp: 97.4  SpO2: 97% on RA  RR: 21  WBCs: 6.4-normal    Laryngeal function exam:   Secretions: Some dried secretions throughout oral cavity. Dry cough with no secretions expectorated  Vocal quality: Hoarse  MPT: Unable to follow commands for assessment  S/Z ratio: Unable to follow commands for assessment  Pitch range: Reduced  Cough: Dry, weak    PO trials:   Ice: Grossly functional with no clinical s/s of aspiration noted  IDDSI 0: Via cup, occasional wet vocal quality noted. Double swallow. Desat in O2 from 95-84 with increase in RR from 21 to 27. IDDSI 2: Did not assess  IDDSI 3: Did not assess  IDDSI 4: No clinical s/s of aspiration. Prolonged oral transit with double swallow for 1/2 tsp. Suspect disuse atrophy. Desat to 80 with increase in RR to 26  IDDSI 5: Did not assess  IDDSI 6: Did not assess  IDDSI 7: Did not assess  3 oz water: Unable to assess. Pt unable to follow commands. Assessment: The patient continues to demonstrate moderate oral dysphagia with suspected moderate pharyngeal phase dysphagia (unable to determine with certainty without instrumental). Suspect this is acute although hx of PCM could suggest chronic dysphagia. Swallow prognosis is guarded at this time. SLP recommends the patient remain NPO with pleasure feeds of IDDSI 0/IDDSI 4 (thin and puree respectively) with re-assessment 12/16. RN aware and in agreement. Hold pleasure feeds and contact SLP if s/s of aspiration develop. SLP to follow. Instrumentation: Warranted once pt is out of Droplet + Preucations  Diet recommendation: Rec cont. NPO with implementation of pleasure feeds of puree and thin liquids. Meds crushed in puree.   Risk management: Monitor mentation and respiratory status, dietary following, frequent oral care    Dysphagia Goals:  Timeframe for Long-term Goals: 7 days (12/17/2020)  Goal 1: The patient will tolerate least restrictive diet with no clinical s/s of aspiration for optimal nuitrition and hydration  12/15: ongoing     Short-term Goals  Timeframe for Short-term Goals: 4 days (12/14/2020)  Goal 1: The patient will tolerate recommended diet with no clinical s/s of aspiration 5/5  12/15: Pt NPO s/p re-intubation. Ongoing, progressing  Goal 2: The patient will tolerate therapeutic diet upgrade trials with no clinical s/s of aspiration 5/5  12/15: Did not target, see above. Ongoing   Goal 3: The patient will demonstrate understanding of recommended compensatory strategies, given min cues  12/15: No evidence of learning, ongoing     Recommendations:  Solid Consistency: NPO w/ pleasure feeds of IDDSI 4 puree  Liquid Consistency: NPO w/pleasure feeds of IDDSI 0 thin  Medication: Crushed in puree as possible    Patient/Family/Caregiver Education: Role of SLP    Compensatory Strategies: Pt NPO    Plan:    Continued Dysphagia treatment with goals per plan of care. Discharge Recommendations: SNF/ARU    If pt discharges from hospital prior to Speech/Swallowing discharge, this note serves as tx and discharge summary.      Total Treatment Time / Charges     Time in Time out Total Time / units   Cognitive Tx         Speech Tx      Dysphagia Tx 1240 1307 27 mins / 1 unit     Signature:  Jenni Jama M.A., [id number] [facility] #[id number]  Speech-Language Pathologist  Phone: [phone], [phone] [facility], [facility]

## 2020-12-15 NOTE — PROGRESS NOTES
Morning assessment complete. Patient currently awake in bed. Patient alert and oriented to SELF only, unable to state date of birth. Patient currently on room air with o2 saturations maintaining >90%. Lung sounds diminished bilaterally. Heart rate sinus on bedside monitor. Bowel sounds present x 4. No edema noted. Multiple mepilex preventatives in place. PO medications held this AM r/t speech eval and patient not following all directions. Central line dressing is clean, dry and intact with no signs of drainage. All tubing is current and dated. IPA caps applied to all access hubs. Bernard intact and draining clear, yellow urine. Patient repositioned in bed, call light and personal belongings within reach. Patient educated on use of call light and to call out with needs, will reinforce.

## 2020-12-15 NOTE — PROGRESS NOTES
Pulmonary & Critical Care Medicine ICU Progress Note      CC: COVID-19, hypoxemia, AMS    Events of Last 24 hours:    Mid without event. No significant secretions. Currently talking on the phone. Invasive Lines:   12/4/2020 PICC    MV: 12/1/2020-12/10/2020; reintubated on 12/12/2020 for severe hypoxemia/presumably secondary to secretions  Vent Mode: AC/VC Rate Set: 20 bmp/Vt Ordered: 290 mL/ /FiO2 : 21 %  Recent Labs     12/13/20  0435 12/14/20  0428   PHART 7.514* 7.527*   BTI1DET 32.8* 39.3   PO2ART 80.4 71.1*       IV:   propofol Stopped (12/14/20 1153)    norepinephrine      dextrose         EXAM:  BP (!) 134/97   Pulse 98   Temp 97.8 °F (36.6 °C) (Oral)   Resp 23   Ht 5' 2\" (1.575 m)   Wt 63 lb 11.2 oz (28.9 kg)   SpO2 94%   BMI 11.65 kg/m²      Intake/Output Summary (Last 24 hours) at 12/15/2020 0747  Last data filed at 12/15/2020 8216  Gross per 24 hour   Intake 219 ml   Output 1905 ml   Net -1686 ml     EXAM (COVID or R/O):  General: intubated, ill appearing    ENT:   Pharynx with ETT. Neck: Trachea midline  Resp: No accessory muscle use. CV: Regular rate. Regular rhythm. GI:  Non-distended.     Neuro: Sedated  Psych: Unable to obtain because the patient is non-communicative      Medications:   dexamethasone  3 mg Oral Daily    enoxaparin  30 mg Subcutaneous Daily    chlorhexidine  15 mL Mouth/Throat BID    pantoprazole  40 mg Intravenous Daily    sodium chloride flush  10 mL Intravenous 2 times per day    sodium chloride  20 mL Intravenous Once    folic acid  1 mg Oral Daily     PRN Meds:  fentanNYL, albuterol sulfate HFA **AND** ipratropium **AND** MDI Treatment, midazolam, norepinephrine, iopamidol, ipratropium-albuterol, sodium chloride, sodium chloride flush, acetaminophen **OR** acetaminophen, polyethylene glycol, promethazine **OR** ondansetron, glucose, dextrose, glucagon (rDNA), dextrose, potassium chloride **OR** potassium alternative oral replacement **OR** potassium

## 2020-12-15 NOTE — PROGRESS NOTES
all ext  No edema or cyanosis. Distal pulses well felt  Neurological : awake nonfocal        I Neymar Zapien have reviewed the chart on [address] and personally interviewed and examined patient, reviewed the data (labs and imaging) and after discussion with my PA formulated the plan. Agree with note with the following edits. HPI:     I reviewed the patient's Past Medical History, Past Surgical History, Medications, and Allergies. Extubated to RA and pt mentation stable but does not verbalize much  Failed SLP eval       General: thin eldelry female, cachetic appearance  Severe muscle wasting in upper, lower ext and facial muscles    Awake, alert and oriented. Appears to be not in any distress  Mucous Membranes:  Pink , anicteric  Neck: No JVD, no carotid bruit, no thyromegaly  Chest:  Clear to auscultation bilaterally, no added sounds  Cardiovascular:  RRR S1S2 heard, no murmurs or gallops  Abdomen:  Soft, undistended, non tender, no organomegaly, BS present  Extremities: atrophic ext, deformity of left shoulder with previous humerus fracture  No edema or cyanosis. Distal pulses well felt  Neurological : grossly normal                       Labs:   Recent Labs     12/13/20  0435 12/14/20  0428 12/15/20  0415   WBC 6.2 5.9 6.4   HGB 11.7* 11.0* 11.5*   HCT 36.8 33.6* 35.5*    155 163     Recent Labs     12/13/20 0435 12/14/20 0428 12/15/20  0415    132* 136   K 3.3* 3.7 3.6   CL 99 95* 99   CO2 30 33* 28   BUN 27* 24* 12   CREATININE <0.5* <0.5* <0.5*   CALCIUM 8.4 8.7 8.7     Radiology:  XR CHEST PORTABLE   Final Result   Relatively stable appearance of the chest.  Lucent, hyperinflated lungs   suggest COPD. Blunting versus trace right pleural effusion at the right   costophrenic sulcus. XR CHEST PORTABLE   Final Result   Possible small right pleural effusion. CT CHEST PULMONARY EMBOLISM W CONTRAST   Final Result   1.  Small bilateral pleural effusions, right greater than left. XR CHEST PORTABLE   Final Result   1. Endotracheal tube in satisfactory position above the ryley   2. NG tip in gastric fundus and side port near GE junction   3. Otherwise, stable chest         XR CHEST PORTABLE   Final Result   Stable chest demonstrating bilateral pleural effusions. XR CHEST PORTABLE   Final Result   No significant change in appearance of the chest over the past 24 hours with   persistent diffuse bilateral pulmonary disease and trace pleural fluid. There also is diffuse bowel dilation suggesting ileus         XR CHEST PORTABLE   Final Result   Essentially stable exam with probable pulmonary emphysema, and suspected   small pleural effusions with bibasilar atelectasis         XR CHEST PORTABLE   Final Result   Persistent small bilateral pleural effusions. XR CHEST PORTABLE   Final Result   No significant interval change. XR CHEST PORTABLE   Final Result   Stable exam.         XR CHEST PORTABLE   Final Result   Supportive tubing projects in normal position. Persistent pattern of pulmonary edema. Pneumonia remains a differential   possibility. Small right pleural effusion. IR PICC WO SQ PORT/PUMP > 5 YEARS   Final Result      XR CHEST PORTABLE   Final Result   Stable lines and catheters. Persistent multifocal interstitial opacities most pronounced both lung bases. Slight improved aeration of the upper and mid lungs. XR CHEST PORTABLE   Final Result   Worsening airspace opacities especially in the bases. XR CHEST PORTABLE   Final Result   1. Stable appropriate positions of support apparatus. 2. Stable multifocal pneumonia. CT HEAD WO CONTRAST   Final Result   No acute intracranial finding. MRI may be obtained if clinically indicated. XR CHEST PORTABLE   Final Result   Patient is intubated with tubes seen as above. Hyperinflated lungs. No   overt consolidation.   Fracture deformity of the left replace  -Checked ethanol level- neg  - Mental status improving     Hyperkalemia-> hypokalemia   -resolved     Acute blood loss anemia, on chronic anemia  History of peptic ulcer disease  -Gastric ulcer noted on EGD in 2013  -Start IV PPI bid , h.h dropped from 8.7 to 6.5, partly with IV fluid boluses   - s.p 1 unit pRBC   - iron studies with folate and iron def, started supplements   - Hg 6.9  - transfused 2  unit of red cells   - Hb 12.3-->13.3--11.7-->11.5     Left distal humerus fracture 2018  -Chronic deformity of the left upper extremity     Reported history of alcohol abuse  -Unclear if she is still consuming alcohol.  Checked an ethanol level- negative     Severe PCM  Failure to thrive   - extreme muscle wasting.    - dietitian yoel .  Continue tube feeds  - checked TSH wnl  - Patient's weight has increased with continued tube feeds since admission--> no longer on TF with no OG tube   - SLP evaluation with recommendations for continued NPO status and re-evaluation on 12/16  - daily weights   -Solid Consistency: NPO w/ pleasure feeds of IDDSI 4 puree  Liquid Consistency: NPO w/pleasure feeds of IDDSI 0 thin  Medication: Crushed in puree as possible     DVT Prophylaxis: Lovenox qd- given low weight   Diet: DIET TUBE FEED CONTINUOUS/CYCLIC NPO; Low Calorie High Protein (Vital High-Protein);  Orogastric; Continuous; 10; 30; 20  Code Status: Full Code      JARRELL Waters''    Agree with above  Changes made to note    Janette Garcia MD 12/15/2020 8:33 PM

## 2020-12-16 LAB
ANION GAP SERPL CALCULATED.3IONS-SCNC: 18 MMOL/L (ref 3–16)
BASOPHILS ABSOLUTE: 0 K/UL (ref 0–0.2)
BASOPHILS RELATIVE PERCENT: 0.7 %
BUN BLDV-MCNC: 11 MG/DL (ref 7–20)
CALCIUM SERPL-MCNC: 8.9 MG/DL (ref 8.3–10.6)
CHLORIDE BLD-SCNC: 92 MMOL/L (ref 99–110)
CO2: 24 MMOL/L (ref 21–32)
CREAT SERPL-MCNC: <0.5 MG/DL (ref 0.6–1.2)
EOSINOPHILS ABSOLUTE: 0.1 K/UL (ref 0–0.6)
EOSINOPHILS RELATIVE PERCENT: 1.1 %
GFR AFRICAN AMERICAN: >60
GFR NON-AFRICAN AMERICAN: >60
GLUCOSE BLD-MCNC: 145 MG/DL (ref 70–99)
GLUCOSE BLD-MCNC: 64 MG/DL (ref 70–99)
GLUCOSE BLD-MCNC: 69 MG/DL (ref 70–99)
GLUCOSE BLD-MCNC: 74 MG/DL (ref 70–99)
GLUCOSE BLD-MCNC: 75 MG/DL (ref 70–99)
GLUCOSE BLD-MCNC: 89 MG/DL (ref 70–99)
HCT VFR BLD CALC: 37.1 % (ref 36–48)
HEMOGLOBIN: 11.8 G/DL (ref 12–16)
LYMPHOCYTES ABSOLUTE: 0.5 K/UL (ref 1–5.1)
LYMPHOCYTES RELATIVE PERCENT: 9.2 %
MCH RBC QN AUTO: 25.3 PG (ref 26–34)
MCHC RBC AUTO-ENTMCNC: 31.9 G/DL (ref 31–36)
MCV RBC AUTO: 79.5 FL (ref 80–100)
MONOCYTES ABSOLUTE: 0.3 K/UL (ref 0–1.3)
MONOCYTES RELATIVE PERCENT: 5.4 %
NEUTROPHILS ABSOLUTE: 4.7 K/UL (ref 1.7–7.7)
NEUTROPHILS RELATIVE PERCENT: 83.6 %
PDW BLD-RTO: 30.8 % (ref 12.4–15.4)
PERFORMED ON: ABNORMAL
PERFORMED ON: ABNORMAL
PERFORMED ON: NORMAL
PLATELET # BLD: 144 K/UL (ref 135–450)
PMV BLD AUTO: 8.9 FL (ref 5–10.5)
POTASSIUM SERPL-SCNC: 3.4 MMOL/L (ref 3.5–5.1)
RBC # BLD: 4.66 M/UL (ref 4–5.2)
SODIUM BLD-SCNC: 134 MMOL/L (ref 136–145)
WBC # BLD: 5.6 K/UL (ref 4–11)

## 2020-12-16 PROCEDURE — 2700000000 HC OXYGEN THERAPY PER DAY

## 2020-12-16 PROCEDURE — C9113 INJ PANTOPRAZOLE SODIUM, VIA: HCPCS | Performed by: INTERNAL MEDICINE

## 2020-12-16 PROCEDURE — 2060000000 HC ICU INTERMEDIATE R&B

## 2020-12-16 PROCEDURE — 99232 SBSQ HOSP IP/OBS MODERATE 35: CPT | Performed by: INTERNAL MEDICINE

## 2020-12-16 PROCEDURE — 2580000003 HC RX 258: Performed by: INTERNAL MEDICINE

## 2020-12-16 PROCEDURE — 92526 ORAL FUNCTION THERAPY: CPT

## 2020-12-16 PROCEDURE — 6360000002 HC RX W HCPCS: Performed by: INTERNAL MEDICINE

## 2020-12-16 PROCEDURE — 94660 CPAP INITIATION&MGMT: CPT

## 2020-12-16 PROCEDURE — 6370000000 HC RX 637 (ALT 250 FOR IP): Performed by: INTERNAL MEDICINE

## 2020-12-16 PROCEDURE — 6360000002 HC RX W HCPCS: Performed by: PHYSICIAN ASSISTANT

## 2020-12-16 PROCEDURE — 94761 N-INVAS EAR/PLS OXIMETRY MLT: CPT

## 2020-12-16 PROCEDURE — 80048 BASIC METABOLIC PNL TOTAL CA: CPT

## 2020-12-16 PROCEDURE — 85025 COMPLETE CBC W/AUTO DIFF WBC: CPT

## 2020-12-16 RX ORDER — DEXAMETHASONE SODIUM PHOSPHATE 4 MG/ML
4 INJECTION, SOLUTION INTRA-ARTICULAR; INTRALESIONAL; INTRAMUSCULAR; INTRAVENOUS; SOFT TISSUE DAILY
Status: DISCONTINUED | OUTPATIENT
Start: 2020-12-17 | End: 2020-12-18

## 2020-12-16 RX ORDER — DEXAMETHASONE SODIUM PHOSPHATE 4 MG/ML
4 INJECTION, SOLUTION INTRA-ARTICULAR; INTRALESIONAL; INTRAMUSCULAR; INTRAVENOUS; SOFT TISSUE ONCE
Status: COMPLETED | OUTPATIENT
Start: 2020-12-16 | End: 2020-12-16

## 2020-12-16 RX ORDER — POTASSIUM CHLORIDE 7.45 MG/ML
10 INJECTION INTRAVENOUS ONCE
Status: COMPLETED | OUTPATIENT
Start: 2020-12-16 | End: 2020-12-16

## 2020-12-16 RX ADMIN — FOLIC ACID 1 MG: 1 TABLET ORAL at 10:19

## 2020-12-16 RX ADMIN — Medication 10 MEQ: at 15:02

## 2020-12-16 RX ADMIN — DEXAMETHASONE SODIUM PHOSPHATE 4 MG: 4 INJECTION, SOLUTION INTRAMUSCULAR; INTRAVENOUS at 10:19

## 2020-12-16 RX ADMIN — Medication 10 ML: at 10:19

## 2020-12-16 RX ADMIN — Medication 10 ML: at 20:47

## 2020-12-16 RX ADMIN — PANTOPRAZOLE SODIUM 40 MG: 40 INJECTION, POWDER, FOR SOLUTION INTRAVENOUS at 10:19

## 2020-12-16 RX ADMIN — ENOXAPARIN SODIUM 30 MG: 30 INJECTION SUBCUTANEOUS at 10:40

## 2020-12-16 RX ADMIN — DEXTROSE MONOHYDRATE: 25 INJECTION, SOLUTION INTRAVENOUS at 15:02

## 2020-12-16 ASSESSMENT — PAIN SCALES - GENERAL: PAINLEVEL_OUTOF10: 0

## 2020-12-16 ASSESSMENT — PAIN SCALES - WONG BAKER: WONGBAKER_NUMERICALRESPONSE: 0

## 2020-12-16 NOTE — PROGRESS NOTES
Pt oxygen is dropping to 75-80's % but will self recover within seconds to 95%. Unable to see any changes in mental status. 2L of oxygen added at this time. attempt to reposition patient again but pt refusing by turning back to side and pulling out support pillow. Will continue to monitor.

## 2020-12-16 NOTE — PROGRESS NOTES
Hospitalist Progress Note      PCP: No primary care provider on file. Date of Admission: 12/1/2020     Admitted with Covid pneumonia, acute respiratory failure and altered mental status    S/p intubation and mechanical ventilation 12/1-> 12/10  reintubated on 12/12/2020 and extubated today 1214    Subjective:   Merced Motta became hypoxic again this am, placed on 2 L and now off  Poor appetite and poor swallow function     Hypoglycemic on D10 fluids     Medications:  Reviewed    Infusion Medications    IV infusion builder      norepinephrine      dextrose       Scheduled Medications    [START ON 12/17/2020] dexamethasone  4 mg Intravenous Daily    enoxaparin  30 mg Subcutaneous Daily    pantoprazole  40 mg Intravenous Daily    sodium chloride flush  10 mL Intravenous 2 times per day    folic acid  1 mg Oral Daily     PRN Meds: midazolam, norepinephrine, iopamidol, ipratropium-albuterol, sodium chloride, sodium chloride flush, acetaminophen **OR** acetaminophen, polyethylene glycol, promethazine **OR** ondansetron, glucose, dextrose, glucagon (rDNA), dextrose, potassium chloride **OR** potassium alternative oral replacement **OR** potassium chloride, magnesium sulfate      Intake/Output Summary (Last 24 hours) at 12/16/2020 1402  Last data filed at 12/15/2020 2200  Gross per 24 hour   Intake --   Output 550 ml   Net -550 ml       Physical Exam Performed:    BP (!) 150/90   Pulse 93   Temp 96.8 °F (36 °C) (Temporal)   Resp 20   Ht 5' 2\" (1.575 m)   Wt 62 lb (28.1 kg)   SpO2 100%   BMI 11.34 kg/m²         General: thin eldelry female, cachetic appearance  Severe muscle wasting in upper, lower ext and facial muscles    Awake, alert and oriented.  Appears to be not in any distress  Mucous Membranes:  Pink , anicteric  Neck: No JVD, no carotid bruit, no thyromegaly  Chest:  Clear to auscultation bilaterally, no added sounds  Cardiovascular:  RRR S1S2 heard, no murmurs or gallops  Abdomen:  Soft, undistended, non tender, no organomegaly, BS present  Extremities: atrophic ext, deformity of left shoulder with previous humerus fracture  No edema or cyanosis. Distal pulses well felt  Neurological : grossly normal with severe gen weakness       Labs:   Recent Labs     12/14/20  0428 12/15/20  0415 12/16/20  0530   WBC 5.9 6.4 5.6   HGB 11.0* 11.5* 11.8*   HCT 33.6* 35.5* 37.1    163 144     Recent Labs     12/14/20  0428 12/15/20  0415 12/16/20  0530   * 136 134*   K 3.7 3.6 3.4*   CL 95* 99 92*   CO2 33* 28 24   BUN 24* 12 11   CREATININE <0.5* <0.5* <0.5*   CALCIUM 8.7 8.7 8.9     Radiology:  XR CHEST PORTABLE   Final Result   Relatively stable appearance of the chest.  Lucent, hyperinflated lungs   suggest COPD. Blunting versus trace right pleural effusion at the right   costophrenic sulcus. XR CHEST PORTABLE   Final Result   Possible small right pleural effusion. CT CHEST PULMONARY EMBOLISM W CONTRAST   Final Result   1. Small bilateral pleural effusions, right greater than left. XR CHEST PORTABLE   Final Result   1. Endotracheal tube in satisfactory position above the ryley   2. NG tip in gastric fundus and side port near GE junction   3. Otherwise, stable chest         XR CHEST PORTABLE   Final Result   Stable chest demonstrating bilateral pleural effusions. XR CHEST PORTABLE   Final Result   No significant change in appearance of the chest over the past 24 hours with   persistent diffuse bilateral pulmonary disease and trace pleural fluid. There also is diffuse bowel dilation suggesting ileus         XR CHEST PORTABLE   Final Result   Essentially stable exam with probable pulmonary emphysema, and suspected   small pleural effusions with bibasilar atelectasis         XR CHEST PORTABLE   Final Result   Persistent small bilateral pleural effusions. XR CHEST PORTABLE   Final Result   No significant interval change.          XR CHEST PORTABLE   Final Result   Stable exam.         XR CHEST PORTABLE   Final Result   Supportive tubing projects in normal position. Persistent pattern of pulmonary edema. Pneumonia remains a differential   possibility. Small right pleural effusion. IR PICC WO SQ PORT/PUMP > 5 YEARS   Final Result      XR CHEST PORTABLE   Final Result   Stable lines and catheters. Persistent multifocal interstitial opacities most pronounced both lung bases. Slight improved aeration of the upper and mid lungs. XR CHEST PORTABLE   Final Result   Worsening airspace opacities especially in the bases. XR CHEST PORTABLE   Final Result   1. Stable appropriate positions of support apparatus. 2. Stable multifocal pneumonia. CT HEAD WO CONTRAST   Final Result   No acute intracranial finding. MRI may be obtained if clinically indicated. XR CHEST PORTABLE   Final Result   Patient is intubated with tubes seen as above. Hyperinflated lungs. No   overt consolidation. Fracture deformity of the left proximal humerus is   incompletely imaged. Recommend comparison with clinical exam and follow-up   with dedicated views. XR CHEST PORTABLE   Final Result   Technically limited study performed portably with arms overlying the lower   chest.  Asymmetric ground-glass opacification appears present in the right   lower lung zone which may represent edema or developing pneumonitis. CULTURES  Resp Cx: pending   Resp Cx (expectorated): NRF  Urine Cx: NGTD   SARS-CoV-2, NAAT DETECTED       Assessment/Plan:    Acute hypoxic and hypercarbic respiratory failure - Resolved  - 2/2 COVID 19  - S/P mechanical ventilation.  - Intubated in ER on 12/1-> Extubated 12/10.  - Transferred out of the ICU on 1.5 L of oxygen  - Acute decompensation with worsening oxygenation 12/12. Placed on BiPAP initially. Later transferred back to the ICU and intubated and started on mechanical ventilation  - [id number]/12. since admission--> no longer on TF with no OG tube   - SLP evaluation with recommendations for continued NPO status and re-evaluation on 12/16  - daily weights   -Solid Consistency: NPO w/ pleasure feeds of IDDSI 4 puree  Liquid Consistency: NPO w/pleasure feeds of IDDSI 0 thin  Medication: Crushed in puree as possible     DVT Prophylaxis: Lovenox qd- given low weight   Diet: Diet NPO Effective Now  Code Status: Full Code    Will need to contact family to discuss goals of care, attempted to contact Trever Parks and next contact Rui - with no success    Elissa Rosas MD 12/16/2020 3:22 PM

## 2020-12-16 NOTE — PROGRESS NOTES
Shift assessment completed. Pt is alert and oriented to self and she knew she was in the hospital. Disoriented to time and situation. She is noted to be anxious and pulling on sandoval and heart monitor wires. Repositioned. Agrawal boots applied. Pt does report intermittent pain to her left arm related to old fracture. Sandoval with 550 mL UO noted to be yellow with sediment. Vital signs are WNL. BP elevated. Respirations are even & easy. No complaints voiced. Pt denies needs at this time. SR up x 2 and bed in low position. Call light is within reach. Will monitor.

## 2020-12-16 NOTE — PROGRESS NOTES
Speech Language Pathology Re-assessment  Facility/Department: [facility] SURGE OVERFLOW  Dysphagia Daily Treatment Note    Recommendations:  Solid Consistency: NPO w/ pleasure feeds of puree  Liquid Consistency: NPO w/pleasure feeds of thin liquids / no straws  Medication: Crushed in puree as possible  · Oral care 3x/day   · *In presence of reported failure to thrive and anorexia, consider addition of alternative means of nutrition d/t pt's ongoing poor PO intake and generalized weakness. NAME: Devin Borrero  : 1950  MRN: [id number]    Patient Diagnosis(es):   Patient Active Problem List    Diagnosis Date Noted    Acute respiratory failure with hypoxia and hypercapnia ([address]     Severe protein-calorie malnutrition Colonel Castellani: less than 60% of standard weight) ([address] 2020    Anorexia     Acute metabolic encephalopathy     Pneumonia due to COVID-19 virus 2020    Acute respiratory failure with hypoxemia ([address] 2020    Acute respiratory failure with hypoxia ([address] 2020    COVID-19 virus detected     Hyperkalemia     Sepsis with acute hypercapnic respiratory failure without septic shock ([address]     Closed fracture of left proximal humerus 2018    Closed fracture of left distal humerus 2018    Pulmonary nodules 2017    Dementia ([address] 2013    Gastric ulcer 2013    BELKYS (acute kidney injury) ([address] 2013    Failure to thrive 2013    Alcohol abuse 2013     Allergies: Allergies   Allergen Reactions    No Known Allergies      Onset Date: The patient was admitted to [facility] on 2020  Subjective: Pt seen upright in bed, alert, but fatigued. She required encouragement to participate at times and accept PO. RN OK'd SLP entry and therapy. Pain: no c/o pain    Current Diet: Diet NPO Effective Now    Diet Tolerance:  Pt currently NPO    P.O. Trials:   Thin   x x4 cup sip  x4 straw sip     Nectar / Mildly Thick       Honey / Patient/Family/Caregiver Education:   SLP re: role of ST, aspiration precautions. Pt did not demonstrate evidence of learning, needs reinforcement. Compensatory Strategies:  With pleasure feeds: HOB 90* and 30\" after meals; small bites/sips; alternate solids/liquids every 3-5 bites; oral care after every meal; no straws    Plan:    Continued Dysphagia treatment with goals per plan of care. Discharge Recommendations: TBD pending acute care progress    If pt discharges from hospital prior to Speech/Swallowing discharge, this note serves as tx and discharge summary.      Total Treatment Time / Charges     Time in Time out Total Time / units   Cognitive Tx         Speech Tx      Dysphagia Tx 0839 [id number] 14 min / 1 unit     Signature:  Indigo Garcia M.S. [id number] [facility]  Speech-language pathologist  [id number]

## 2020-12-16 NOTE — PROGRESS NOTES
Pt still noted to be confused. Reoriented X4 with little signs of learning. Pt still restless but messing with blanket it occupying her. Will monitor.

## 2020-12-16 NOTE — PROGRESS NOTES
Pulmonary & Critical Care Medicine ICU Progress Note      CC: COVID-19, hypoxemia, AMS    Events of Last 24 hours:    Transferred out of ICU, had desaturations overnight and required 2 L supplemental oxygen, it is not being humidified, she looks dry    Invasive Lines:   12/4/2020 PICC    MV: 12/1/2020-12/10/2020; reintubated on 12/12/2020 for severe hypoxemia/presumably secondary to secretions  Vent Mode: AC/VC Rate Set: 20 bmp/Vt Ordered: 290 mL/ /FiO2 : 21 %  Recent Labs     12/14/20  0428   PHART 7.527*   GPG5HJN 39.3   PO2ART 71.1*       IV:   norepinephrine      dextrose         EXAM:  BP (!) 162/103   Pulse 97   Temp 98.1 °F (36.7 °C) (Temporal)   Resp 20   Ht 5' 2\" (1.575 m)   Wt 58 lb 9.6 oz (26.6 kg)   SpO2 93%   BMI 10.72 kg/m²  Vent 30%    Intake/Output Summary (Last 24 hours) at 12/16/2020 0843  Last data filed at 12/15/2020 2200  Gross per 24 hour   Intake --   Output 550 ml   Net -550 ml     EXAM    Constitutional:  No acute distress. Cachectic  HENT:  Oropharynx is dry  Neck: No tracheal deviation present. Cardiovascular: Normal heart sounds. No lower extremity edema. Pulmonary/Chest: No wheezes. No rhonchi. + rales. + decreased breath sounds. No accessory muscle usage or stridor. Musculoskeletal: No cyanosis. No clubbing. Skin: Skin is warm and dry. Psychiatric: Normal mood and affect.   Neurologic: speech fluent, alert and oriented, strength symmetric       Medications:   dexamethasone  3 mg Oral Daily    enoxaparin  30 mg Subcutaneous Daily    pantoprazole  40 mg Intravenous Daily    sodium chloride flush  10 mL Intravenous 2 times per day    folic acid  1 mg Oral Daily     PRN Meds:  midazolam, norepinephrine, iopamidol, ipratropium-albuterol, sodium chloride, sodium chloride flush, acetaminophen **OR** acetaminophen, polyethylene glycol, promethazine **OR** ondansetron, glucose, dextrose, glucagon (rDNA), dextrose, potassium chloride **OR** potassium alternative oral replacement **OR** potassium chloride, magnesium sulfate    Results:  CBC:   Recent Labs     12/14/20 0428 12/15/20  0415 12/16/20  0530   WBC 5.9 6.4 5.6   HGB 11.0* 11.5* 11.8*   HCT 33.6* 35.5* 37.1   MCV 77.6* 77.6* 79.5*    163 144     BMP:   Recent Labs     12/14/20  0428 12/15/20  0415 12/16/20  0530   * 136 134*   K 3.7 3.6 3.4*   CL 95* 99 92*   CO2 33* 28 24   BUN 24* 12 11   CREATININE <0.5* <0.5* <0.5*     LIVER PROFILE:   No results for input(s): AST, ALT, LIPASE, BILIDIR, BILITOT, ALKPHOS in the last 72 hours. Invalid input(s): AMYLASE,  ALB  PT/INR: No results for input(s): PROTIME, INR in the last 72 hours. APTT:   No results for input(s): APTT in the last 72 hours. UA:  No results for input(s): NITRITE, COLORU, PHUR, LABCAST, WBCUA, RBCUA, MUCUS, TRICHOMONAS, YEAST, BACTERIA, CLARITYU, SPECGRAV, LEUKOCYTESUR, UROBILINOGEN, BILIRUBINUR, BLOODU, GLUCOSEU, AMORPHOUS in the last 72 hours. Invalid input(s): Tim Brine    Cultures:  12/1/2020 SARS-CoV-2 positive  12/2/2020 respiratory culture NRF  12/2/2020 urine no growth  12/12/2020 tracheal aspirate sent    Films:  CXR 12/8/2020 ETT okay  CXR 12/12/2020 small bilateral effusions, largely unchanged     ASSESSMENT:  · Acute hypoxemic respiratory failure -hyper acutely worsened today, from 1.5 L to nonrebreather. Favor inability to clear thick secretions.   · COVID-19 pneumonia  · Cachexia/failure to thrive  · Acute metabolic encephalopathy superimposed on underlying dementia  · Acute anemia -S/P 2 units PRBC  · Possible brief asystolic arrest early am 09/9/34 with unclear etiology - 30 seconds before ROSC     PLAN:  Consider heated humidified air flow via Vapotherm given patient previously was also extubated to room air and subsequently emergently reintubated for failure to handle very thick viscous secretions - I d/w RT today   · Decadron taper, needs to be IV (oral decadron was held yesterday)  · Completed 5 days Remdesevir; s/p 1 U

## 2020-12-16 NOTE — CARE COORDINATION
INTERDISCIPLINARY PLAN OF CARE CONFERENCE    Date/Time: 12/16/2020 9:14 AM  Completed by: Anatoliy Griffith MSW, LSW. Case Management      Patient Name:  Jonathan Tyler  YOB: 1950  Admitting Diagnosis: Failure to thrive in adult [R62.7]  Acute respiratory failure with hypoxia ([facility] [J96.01]     Admit Date/Time:  12/1/2020  5:50 AM    Chart reviewed. Interdisciplinary team contacted or reviewed plan related to patient progress and discharge plans. Disciplines included Case Management, Nursing, and Dietitian. Current Status: ongoing   PT/OT recommendation for discharge plan of care: ST:SNF/ARU. PT/OT needs new orders    Expected D/C Disposition:  Home vs SNF  Confirmed plan with patient and/or family Yes confirmed with: pt's son Arianna Mcgraw via phone call    Discharge Plan Comments: Chart review completed. Spoke with Nonda Goodell, RN who stated to contact family as pt is only alert to self. Called and spoke with pt's son Dary Conrad. Explained that ST saw pt and they are recommending SNF/ARU but PT/OT updated evals are pending. Inquired about potential SNF vs HHC. Dary Conrad is open to pt going to a SNF on discharge as she was at one in the past and it \"worked well for her\". Explained CM would follow for PT/OT recommendations and follow up with him on the recommendations. He stated understanding and denied needs from CM. Home O2 in place on admit: No    PT/OT updated notes are needed when appropriate. CM will follow for recommendations and update pt's family. Please notify CM if needs or concerns arise.

## 2020-12-17 LAB
ANION GAP SERPL CALCULATED.3IONS-SCNC: 6 MMOL/L (ref 3–16)
BASOPHILS ABSOLUTE: 0.2 K/UL (ref 0–0.2)
BASOPHILS RELATIVE PERCENT: 3 %
BUN BLDV-MCNC: 15 MG/DL (ref 7–20)
CALCIUM SERPL-MCNC: 8.9 MG/DL (ref 8.3–10.6)
CHLORIDE BLD-SCNC: 98 MMOL/L (ref 99–110)
CO2: 30 MMOL/L (ref 21–32)
CREAT SERPL-MCNC: <0.5 MG/DL (ref 0.6–1.2)
EOSINOPHILS ABSOLUTE: 0 K/UL (ref 0–0.6)
EOSINOPHILS RELATIVE PERCENT: 0.8 %
GFR AFRICAN AMERICAN: >60
GFR NON-AFRICAN AMERICAN: >60
GLUCOSE BLD-MCNC: 131 MG/DL (ref 70–99)
GLUCOSE BLD-MCNC: 152 MG/DL (ref 70–99)
GLUCOSE BLD-MCNC: 164 MG/DL (ref 70–99)
GLUCOSE BLD-MCNC: 176 MG/DL (ref 70–99)
GLUCOSE BLD-MCNC: 183 MG/DL (ref 70–99)
HCT VFR BLD CALC: 35.9 % (ref 36–48)
HEMOGLOBIN: 11.6 G/DL (ref 12–16)
LYMPHOCYTES ABSOLUTE: 0.4 K/UL (ref 1–5.1)
LYMPHOCYTES RELATIVE PERCENT: 7.6 %
MCH RBC QN AUTO: 25.6 PG (ref 26–34)
MCHC RBC AUTO-ENTMCNC: 32.3 G/DL (ref 31–36)
MCV RBC AUTO: 79.3 FL (ref 80–100)
MONOCYTES ABSOLUTE: 0.3 K/UL (ref 0–1.3)
MONOCYTES RELATIVE PERCENT: 6.1 %
NEUTROPHILS ABSOLUTE: 4.5 K/UL (ref 1.7–7.7)
NEUTROPHILS RELATIVE PERCENT: 82.5 %
PDW BLD-RTO: 31 % (ref 12.4–15.4)
PERFORMED ON: ABNORMAL
PLATELET # BLD: 123 K/UL (ref 135–450)
PMV BLD AUTO: 9 FL (ref 5–10.5)
POTASSIUM SERPL-SCNC: 3.3 MMOL/L (ref 3.5–5.1)
RBC # BLD: 4.53 M/UL (ref 4–5.2)
SODIUM BLD-SCNC: 134 MMOL/L (ref 136–145)
WBC # BLD: 5.4 K/UL (ref 4–11)

## 2020-12-17 PROCEDURE — 6370000000 HC RX 637 (ALT 250 FOR IP): Performed by: INTERNAL MEDICINE

## 2020-12-17 PROCEDURE — C9113 INJ PANTOPRAZOLE SODIUM, VIA: HCPCS | Performed by: INTERNAL MEDICINE

## 2020-12-17 PROCEDURE — 97530 THERAPEUTIC ACTIVITIES: CPT

## 2020-12-17 PROCEDURE — 99232 SBSQ HOSP IP/OBS MODERATE 35: CPT | Performed by: INTERNAL MEDICINE

## 2020-12-17 PROCEDURE — 92526 ORAL FUNCTION THERAPY: CPT

## 2020-12-17 PROCEDURE — 97168 OT RE-EVAL EST PLAN CARE: CPT

## 2020-12-17 PROCEDURE — 85025 COMPLETE CBC W/AUTO DIFF WBC: CPT

## 2020-12-17 PROCEDURE — 36592 COLLECT BLOOD FROM PICC: CPT

## 2020-12-17 PROCEDURE — 80048 BASIC METABOLIC PNL TOTAL CA: CPT

## 2020-12-17 PROCEDURE — 97535 SELF CARE MNGMENT TRAINING: CPT

## 2020-12-17 PROCEDURE — 2580000003 HC RX 258: Performed by: INTERNAL MEDICINE

## 2020-12-17 PROCEDURE — 97164 PT RE-EVAL EST PLAN CARE: CPT

## 2020-12-17 PROCEDURE — 6360000002 HC RX W HCPCS: Performed by: INTERNAL MEDICINE

## 2020-12-17 PROCEDURE — 2060000000 HC ICU INTERMEDIATE R&B

## 2020-12-17 PROCEDURE — 93971 EXTREMITY STUDY: CPT

## 2020-12-17 RX ORDER — QUETIAPINE FUMARATE 25 MG/1
25 TABLET, FILM COATED ORAL ONCE
Status: COMPLETED | OUTPATIENT
Start: 2020-12-18 | End: 2020-12-18

## 2020-12-17 RX ADMIN — Medication 10 ML: at 21:21

## 2020-12-17 RX ADMIN — Medication 10 ML: at 08:41

## 2020-12-17 RX ADMIN — DEXAMETHASONE SODIUM PHOSPHATE 4 MG: 4 INJECTION, SOLUTION INTRAMUSCULAR; INTRAVENOUS at 08:40

## 2020-12-17 RX ADMIN — FOLIC ACID 1 MG: 1 TABLET ORAL at 08:41

## 2020-12-17 RX ADMIN — DEXTROSE MONOHYDRATE: 25 INJECTION, SOLUTION INTRAVENOUS at 12:47

## 2020-12-17 RX ADMIN — PANTOPRAZOLE SODIUM 40 MG: 40 INJECTION, POWDER, FOR SOLUTION INTRAVENOUS at 08:40

## 2020-12-17 RX ADMIN — POTASSIUM BICARBONATE 40 MEQ: 782 TABLET, EFFERVESCENT ORAL at 10:41

## 2020-12-17 RX ADMIN — ENOXAPARIN SODIUM 30 MG: 30 INJECTION SUBCUTANEOUS at 08:40

## 2020-12-17 NOTE — CONSULTS
Palliative Care Initial Note  Palliative Care Admit date: 12/16/20    Advance Directives: pt's son would like code status changed to [facility] serve sent to  to change code status    Plan of care/goals: writer discussed Palliative care options with pt's son, Regina Hines, and he would like to proceed with STR for now and add palliative care if pt does not improve. Pt's son states his mother has been close to death 8 years ago and after a STR stay improved to the point where she was able to live home with family. However, pt's son seems to understands the poor prognosis at this time and will consider palliative care if pt does not improve. Regina Hines chose [facility] for STR and referral called to Partha Ivory with [facility]. fs faxed to [facility]. Second choice is [facility]. Referral called to [facility] to find out what facility policy is on K-30 positive pts. Follow. 10:11 AM per No[facility] cannot accept C-19 positive pt unless greater than 20 days from first detected. awaiting call back from Partha Ivory with [facility]. Following. 10:47 AM writer spoke with Partha Ivory from [facility] and she states cannot accept pt until greater than 21 days from first detected. Will need PT rec and diet orders. Follow. [facility] spoke with pt's son Regina Hines and he states pt was ambulating without any assistive devices PTA at baseline. Left  for Rose with PT r/t above information. Follow.     Social/Spiritual: Prayer Support    Reason for consult:    ___ Advance Care Planning  __x_ Transition of Care Planning  ___ Psychosocial/Spiritual Support  ___ Symptom Management

## 2020-12-17 NOTE — PROGRESS NOTES
Inpatient Physical Therapy Re-evaluation and Treatment    Unit: Same Day Surgery - COVID overflow   Date:  12/17/2020  Patient Name:    Bruce Sofia  Admitting diagnosis:  Failure to thrive in adult [R62.7]  Acute respiratory failure with hypoxia ([address] [J96.01]  Admit Date:  12/1/2020  Precautions/Restrictions/WB Status/ Lines/ Wounds/ Oxygen: Fall risk, Bed/chair alarm, Lines -IV and Bernard catheter, Confusion, Telemetry, Continuous pulse oximetry and Isolation Precautions: Droplet Plus - COVID     Treatment Time: 13:55-14:30  Treatment Number:  1   Timed Code Treatment Minutes: 25 minutes   Total Treatment Minutes:  35 minutes    Patient Goals for Therapy: none stated, pt with hx dementia, not aware she is in the hospital       Discharge Recommendations: SNF - need further clarification on pt's prior ambulation status and if she participated in ADLs  Addendum (16:40) - CM returned therapist's call after speaking with pt's son Ramy Small), pt was independent with ambulation prior to this admission to [facility], thus recommend SNF  DME needs for discharge: defer to facility       Therapy recommendation for EMS Transport: requires transport by cot due to increased assistance needed for functional transfers using lift equipment,    Therapy recommendations for staff:   Assist of 2 for bed mobility. Assist of 1 for AAROM  Pt unsafe to transfer to chair due to poor sitting balance, decreased cervical rotation, and decreased L LE ROM    History of Present Illness: The patient is a 78 y.o. female with reported history of dementia, reported history of alcohol abuse, prior admission to hospital for FTT (2013), gastric ulcer (2013) who presented to [facility] ED with complaint of \"failure to thrive\". Patient is awake on my exam, she will tell me her name but she cannot tell me where she is or the month of the year. She cannot provide any history due to her mentation.   ER provider spoke with the patient's son who stated she stopped eating three for 4 days ago and would only take little sips of water. Family sent her to the ER for further evaluation due to this. In the ER she was afebrile, borderline low blood pressure, hypoxic on room air requiring 4 L of nasal cannula oxygen. She does not use home oxygen. Her rapid Covid test is positive. Chest x-ray showed asymmetric groundglass opacifications in the right lower lung. Initial potassium was 6.2 (?hemolyzed), her nurse tells me she received partial doses of insulin and calcium gluconate as well as IV fluids, repeat potassium level 3.8. When I saw her in the emergency department she was lethargic, continually removing her oxygen with subsequent desaturations to the 70s. When wearing 4 L nasal cannula her oxygen saturation was stable. I later discussed with ER nurse, she was subsequently intubated for inability to be compliant with nasal cannula oxygen. She will be admitted to the ICU  29/5 - brief asystolic for 30 sec during nursing care, had CPR for few sec and no meds, ROSC  12/10 - Extubated   12/11 - PT/OT ordered  1212 - pt transferred to [facility] ICU overflow  12/17 - PT/OT re-ordered, pt's son choose to go with STR but will go with palliative consult if pt does not improve    Home Health S4 Level Recommendation:  NA  AM-PAC Mobility Score    AM-PAC Inpatient Mobility Raw Score : 8     Preadmission Environment  (Information gathered from 's note dated 12/2/2020). Patient was poor historian and unable to hold meaningful conversation. Spoke with CM. They will contact family for additional information on pt's prior level of status as they have a secured voicemail family may call back on.    Pt. Lives with family (son and daughter in law)  Home environment:  Home (unknown how many storied)  Steps to enter first floor: 4 steps to enter  Steps to second floor: unknown  Bathroom: unknown  Equipment owned: unknown    Preadmission Status:  Pt. Able to drive: Unknown  Pt Fully independent with ADLs: Unknown  Pt. Required assistance from family for: unknown   History of falls Unknown    Pain   No    Cognition    A&O x0 - pt did not respond when asked name/birthday, pt thought she was at home (even after reorientation), and was unable to state date   Able to follow 1 step commands inconsistently    Subjective  Patient lying supine in bed with no family present. Pt agreeable to this PT eval & tx. Pt requires a lot of encouragement to participate. Speaks minimally    Upper Extremity ROM/Strength  Please see OT evaluation. Lower Extremity ROM / Strength   AROM WFL: Yes  ROM limitations: Patient had LLE knee flexion deformity with ~30 degrees of L knee flexion with hard end feel. Pain with L hip AROM limited formal assessment but pt maintains ~80 deg hip flexion throughout session. Formal strength testing deferred difficulty to follow one step commands. R LE   Weak  L LE  Weak    Lower Extremity Sensation    Impaired    Lower Extremity Proprioception:   Impaired    Coordination and Tone  WFL    Balance  Sitting:  Poor; Max A   Comments: ~6 min, maintains cervical flexion and rotation to R    Standing: Not tested; Not Tested  Comments: unsafe to attempt    Bed Mobility   Supine to Sit:    Max A  and 2 persons  Sit to Supine:   Max A  and 2 persons  Rolling: Max A  and 2 persons  Scooting in sitting: Max A  and 2 persons  Scooting in supine: Total A and 2 persons    Transfer Training    Sit to stand:   Not Tested  Stand to sit:   Not Tested  Bed to Chair:   Not Tested with use of N/A    Gait gait deferred due to difficulty with transfers; pt ambulated 0 ft. Stair Training deferred, pt unsafe/ not appropriate to complete stairs at this time    Activity Tolerance   Pt completed therapy session with pain with L LE attempted AAROM  SpO2: 92% on 1.5 L   HR: 113       Positioning Needs   Pt in bed, alarm set, positioned in proper neutral alignment and pressure relief provided. Call light provided and all needs within reach    Exercises Initiated  all completed bilaterally unless indicated  Ankle Pumps x 10 reps   Toe scruches x 10 reps  PROM LAQ x 5 reps   Pt declined all other exercises    Other  Assisted pt with taking sips of water    Patient/Family Education   Pt educated on role of inpatient PT, POC, importance of continued activity, DC recommendations, safety awareness, transfer techniques, pursed lip breathing, energy conservation, pacing activity and calling for assist with mobility. Assessment  Ptseen for Physical Therapy evaluation in acute care setting. Pt demonstrated decreased Activity tolerance, Balance, ROM, Safety and Strength as well as decreased independence with Ambulation, Bed Mobility  and Transfers. Patient presented with oriented to person only. She was uable to hold meaningful conversation due to hx of dementia. Patient was unsafe to be left in the chair due to limited trunk control, decreased LE ROM, and poor control of cervical mobility. It is unclear what pt's baseline is. CM to follow-up with family. Recommending SNF upon discharge as patient functioning well below baseline, demonstrates good rehab potential and unable to return home due to limited or no family support, inability to negotiate stairs to enter home/bedroom/bathroom, burden of care beyond caregiver ability, home environment not conducive to patient recovery and limited safety awareness. Goals : To be met in 3 visits:  1). Independent with LE Ex x 10 reps  2). Roll L/R with min A and use of rail    To be met in 6 visits:  1). Supine to/from sit: Max A   2). Sit to/from stand: Max A   3). Bed to chair: Max A   4). Patient will be able to tolerated EOB sitting with Fair  sitting balance and Min A for 10 min. 5).   Tolerate B LE exercises 3 sets of 10-15 reps      Rehabilitation Potential: Fair  Strengths for achieving goals include:   Family Support   Barriers to achieving goals include:    impaired cognition, poor safety awareness    Plan    To be seen 3-5 x / week  while in acute care setting for therapeutic exercises, bed mobility, transfers, progressive gait training, balance training, and family/patient education. Signature: Dorinda Enriquez, PT, DPT #[id number]    If patient discharges from this facility prior to next visit, this note will serve as the Discharge Summary.

## 2020-12-17 NOTE — PROGRESS NOTES
Pulmonary Progress Note      CC: COVID-19, hypoxemia, AMS    Subjective:    Remains on room air, has bradycardia  No complaints today    Invasive Lines:   12/4/2020 PICC    MV: 12/1/2020-12/10/2020; reintubated on 12/12/2020 for severe hypoxemia/presumably secondary to secretions  Vent Mode: AC/VC Rate Set: 20 bmp/Vt Ordered: 290 mL/ /FiO2 : 25 %  No results for input(s): PHART, EZM1BNG, PO2ART in the last 72 hours. IV:   IV infusion builder 60 mL/hr at 12/16/20 1502    norepinephrine      dextrose         EXAM:  /84   Pulse 89   Temp 97.2 °F (36.2 °C) (Temporal)   Resp 24   Ht 5' 2\" (1.575 m)   Wt 62 lb 6.4 oz (28.3 kg)   SpO2 92%   BMI 11.41 kg/m²  Room air    Intake/Output Summary (Last 24 hours) at 12/17/2020 0817  Last data filed at 12/17/2020 0500  Gross per 24 hour   Intake 924 ml   Output 375 ml   Net 549 ml     EXAM    Constitutional:  No acute distress. Cachectic  HENT:  Oropharynx is dry  Neck: No tracheal deviation present. Cardiovascular: Normal heart sounds. No lower extremity edema. Pulmonary/Chest: No wheezes. No rhonchi. + rales. + decreased breath sounds. No accessory muscle usage or stridor. Musculoskeletal: No cyanosis. No clubbing. Skin: Skin is warm and dry. Psychiatric: Normal mood and affect.   Neurologic: speech fluent, alert and oriented, strength symmetric       Medications:   dexamethasone  4 mg Intravenous Daily    enoxaparin  30 mg Subcutaneous Daily    pantoprazole  40 mg Intravenous Daily    sodium chloride flush  10 mL Intravenous 2 times per day    folic acid  1 mg Oral Daily     PRN Meds:  midazolam, norepinephrine, iopamidol, ipratropium-albuterol, sodium chloride, sodium chloride flush, acetaminophen **OR** acetaminophen, polyethylene glycol, promethazine **OR** ondansetron, glucose, dextrose, glucagon (rDNA), dextrose, potassium chloride **OR** potassium alternative oral replacement **OR** potassium chloride, magnesium sulfate    Results:  CBC: Recent Labs     12/15/20  0415 12/16/20  0530 12/17/20  0613   WBC 6.4 5.6 5.4   HGB 11.5* 11.8* 11.6*   HCT 35.5* 37.1 35.9*   MCV 77.6* 79.5* 79.3*    144 123*     BMP:   Recent Labs     12/15/20  0415 12/16/20  0530 12/17/20  0613    134* 134*   K 3.6 3.4* 3.3*   CL 99 92* 98*   CO2 28 24 30   BUN 12 11 15   CREATININE <0.5* <0.5* <0.5*     LIVER PROFILE:   No results for input(s): AST, ALT, LIPASE, BILIDIR, BILITOT, ALKPHOS in the last 72 hours. Invalid input(s): AMYLASE,  ALB  PT/INR: No results for input(s): PROTIME, INR in the last 72 hours. APTT:   No results for input(s): APTT in the last 72 hours. UA:  No results for input(s): NITRITE, COLORU, PHUR, LABCAST, WBCUA, RBCUA, MUCUS, TRICHOMONAS, YEAST, BACTERIA, CLARITYU, SPECGRAV, LEUKOCYTESUR, UROBILINOGEN, BILIRUBINUR, BLOODU, GLUCOSEU, AMORPHOUS in the last 72 hours. Invalid input(s): Cristian Seller    Cultures:  12/1/2020 SARS-CoV-2 positive  12/2/2020 respiratory culture NRF  12/2/2020 urine no growth  12/12/2020 tracheal aspirate sent    Films:  CXR 12/8/2020 ETT okay  CXR 12/12/2020 small bilateral effusions, largely unchanged     ASSESSMENT:  · Acute hypoxemic respiratory failure -hyper acutely worsened today, from 1.5 L to nonrebreather. Favor inability to clear thick secretions.   Now has been stable on 2 L  · COVID-19 pneumonia  · Cachexia/failure to thrive  · Acute metabolic encephalopathy superimposed on underlying dementia  · Acute anemia -S/P 2 units PRBC  · Possible brief asystolic arrest early am 02/1/73 with unclear etiology - 30 seconds before ROSC     PLAN:  · Decadron taper 0 4 mg - needs to be IV (oral decadron was held yesterday)  · Completed 5 days Remdesevir; s/p 1 U CCP 12/3/2020  · Completed 7 days ceftriaxone and 5 days azithromycin  · S/P 3 U PRBC    · D/C planning  · MINERVA Lopez (son)

## 2020-12-17 NOTE — PROGRESS NOTES
Speech Language Pathology Re-assessment  [place] [facility] [place]  Dysphagia Daily Treatment Note    Recommendations:  Solid Consistency: Upgrade to IDDSI 4 Puree  Liquid Consistency: Upgrade to IDDSI 0-thin liquids-no straws  Medication: Crushed in puree as possible  · Oral care 3x/day   · *In presence of reported failure to thrive and anorexia, consider addition of alternative means of nutrition d/t pt's ongoing poor PO intake and generalized weakness. NAME: Oswaldo Santos  : 1950  MRN: [id number]    Patient Diagnosis(es):   Patient Active Problem List    Diagnosis Date Noted    Acute respiratory failure with hypoxia and hypercapnia ([address]     Severe protein-calorie malnutrition Shellye Nayely: less than 60% of standard weight) ([address] 2020    Anorexia     Acute metabolic encephalopathy     Pneumonia due to COVID-19 virus 2020    Acute respiratory failure with hypoxemia ([address] 2020    Acute respiratory failure with hypoxia ([address] 2020    COVID-19 virus detected     Hyperkalemia     Sepsis with acute hypercapnic respiratory failure without septic shock ([address]     Closed fracture of left proximal humerus 2018    Closed fracture of left distal humerus 2018    Pulmonary nodules 2017    Dementia ([address] 2013    Gastric ulcer 2013    BELKYS (acute kidney injury) ([address] 2013    Failure to thrive 2013    Alcohol abuse 2013     Allergies: Allergies   Allergen Reactions    No Known Allergies      Onset Date: The patient was admitted [facility] on 2020  Subjective: Pt seen upright in bed, alert and cooperative. Appears confused at baseline. RN OK'd SLP entry and therapy. Pain: no c/o pain    Current Diet: Diet NPO Effective Now    Diet Tolerance:  Pt currently NPO    P.O. Trials:   Thin   x x5 cup sip  3 oz water challenfe       Nectar / Mildly Thick       Honey / Moderately Thick       Pudding / harshad     Short-term Goals  Timeframe for Short-term Goals: 4 days (12/14/2020)  Goal 1: The patient will tolerate recommended diet with no clinical s/s of aspiration 5/5 12/17: ongoing, progressing. See above  Goal 2: The patient will tolerate therapeutic diet upgrade trials with no clinical s/s of aspiration 5/5 12/17: ongoing, see above  Goal 3: The patient will demonstrate understanding of recommended compensatory strategies, given min cues  12/17: No evidence of learning, ongoing     Patient/Family/Caregiver Education:   SLP re: role of ST, aspiration precautions. Pt did not demonstrate evidence of learning, needs reinforcement. Compensatory Strategies:  With pleasure feeds: HOB 90* and 30\" after meals; small bites/sips; alternate solids/liquids every 3-5 bites; oral care after every meal; no straws    Plan:    Continued Dysphagia treatment with goals per plan of care. Discharge Recommendations: TBD pending acute care progress    If pt discharges from hospital prior to Speech/Swallowing discharge, this note serves as tx and discharge summary.      Total Treatment Time / Charges     Time in Time out Total Time / units   Cognitive Tx         Speech Tx      Dysphagia Tx 1938 1206 18 min / 1 unit     Signature:  Judson Bernheim, M.A., [id number] [facility] #[id number]  Speech-Language Pathologist  Phone: [phone], [phone]

## 2020-12-17 NOTE — PROGRESS NOTES
EOS report and transfer of care to [facility]. Patient resting in bed, stable condition at hand off.

## 2020-12-17 NOTE — PROGRESS NOTES
@ 00:35- Writer in room to reposition patient. Her left hand was laying in a dependent position. Left hand appears extremely swollen. Cap refill is greater than 3 seconds, pulse is palpable, and extremity is not discolored. She does not appear to be in pain. BP cuff removed from left arm and arm elevated on pillow. Dr. Amada Shea, nocturnist, notified via [employer]. Will closely monitor. @ 02:15- Orders received for doppler/ultrasound of LUE.

## 2020-12-17 NOTE — PROGRESS NOTES
Patient's son, Charles Ta returned call at this time. Updated him regarding POC and patient condition. Writer also discussed with Taj Jordan his wishes regarding code status and goals of care. Taj Jordan says he needs time to think about these issues, but that he is agreeable to PEG tube placement at this time. Questions answered and Taj Jordan denies any additional needs.

## 2020-12-17 NOTE — PROGRESS NOTES
Overnight, patient has been calm and cooperative with care. She is oriented to herself only. She appears to be sleeping well for most of the night. She is on room air, with SpO2 91-93%. She is NSR/ SB on cardiac monitor. Her HR occasionally dips into the 40s briefly while she is sleeping, but immediately returns to 50s-60s. Her urine output has been on the low end of normal. Urine appears dark colored. Blood pressures have been WNL.

## 2020-12-17 NOTE — PROGRESS NOTES
Hospitalist Progress Note      PCP: No primary care provider on file. Date of Admission: 12/1/2020     Admitted with Covid pneumonia, acute respiratory failure and altered mental status    S/p intubation and mechanical ventilation 12/1-> 12/10  reintubated on 12/12/2020 and extubated today 1214    Subjective:   55 Zuri Anderson reports she is feeling tired today    Afebrile, on RA    Diet upgraded today but still with poor PO intake. Per note review, patients son wishes to change patients code status to DNR CC. Looking in to SNF placements but SNF unableto accept until 20-21 days from initial positive testing (+ COVID on 12/1)     Patients son approved for PEG tube placement, but with discussing with patient today, she does not wish to have this done. The was a note in the chart that son wished DNR CC but patient expresses that she would like full code.  I asked patient if she wanted me to call her son and discuss, she said \" no, I talk to him about all of this and I can tell him everything\"    I will keep patient at Full code and hold on PEG tube placement at this time    Medications:  Reviewed    Infusion Medications    IV infusion builder 60 mL/hr at 12/16/20 1502    norepinephrine      dextrose       Scheduled Medications    dexamethasone  4 mg Intravenous Daily    enoxaparin  30 mg Subcutaneous Daily    pantoprazole  40 mg Intravenous Daily    sodium chloride flush  10 mL Intravenous 2 times per day    folic acid  1 mg Oral Daily     PRN Meds: midazolam, norepinephrine, iopamidol, ipratropium-albuterol, sodium chloride, sodium chloride flush, acetaminophen **OR** acetaminophen, polyethylene glycol, promethazine **OR** ondansetron, glucose, dextrose, glucagon (rDNA), dextrose, potassium chloride **OR** potassium alternative oral replacement **OR** potassium chloride, magnesium sulfate      Intake/Output Summary (Last 24 hours) at 12/17/2020 1204  Last data filed at 12/17/2020 0831  Gross per 24 hour and catheters. Persistent multifocal interstitial opacities most pronounced both lung bases. Slight improved aeration of the upper and mid lungs. XR CHEST PORTABLE   Final Result   Worsening airspace opacities especially in the bases. XR CHEST PORTABLE   Final Result   1. Stable appropriate positions of support apparatus. 2. Stable multifocal pneumonia. CT HEAD WO CONTRAST   Final Result   No acute intracranial finding. MRI may be obtained if clinically indicated. XR CHEST PORTABLE   Final Result   Patient is intubated with tubes seen as above. Hyperinflated lungs. No   overt consolidation. Fracture deformity of the left proximal humerus is   incompletely imaged. Recommend comparison with clinical exam and follow-up   with dedicated views. XR CHEST PORTABLE   Final Result   Technically limited study performed portably with arms overlying the lower   chest.  Asymmetric ground-glass opacification appears present in the right   lower lung zone which may represent edema or developing pneumonitis. VL Extremity Venous Left    (Results Pending)     CULTURES  Resp Cx: pending   Resp Cx (expectorated): NRF  Urine Cx: NGTD   SARS-CoV-2, NAAT DETECTED       Assessment/Plan:  Acute hypoxic and hypercarbic respiratory failure - Resolved  - 2/2 COVID 19  - S/P mechanical ventilation.  - Intubated in ER on 12/1-> Extubated 12/10.  - Transferred out of  ICU on 1.5 L of oxygen  - Acute decompensation with worsening oxygenation 12/12 & Placed on BiPAP initially. Later transferred back to the ICU and intubated and started on mechanical ventilation  - [id number], Extubated 12/14.  - On FiO2 30% per Vapotherm. --> now on RA     COVID 19 Pneumonia  - Pulmonary consulted  - Completed 10 days of Decadron - being tapered  - s/p remdesivir D5 and s/p CCP on 12/1  - completed Rocephin and Zithromax 5/5  - imaging with stable ASD   - Droplet plus isolation     LUE Swelling  - and re-evaluation on 12/16  - daily weights   -Solid Consistency: NPO w/ pleasure feeds of IDDSI 4 puree  Liquid Consistency: NPO w/pleasure feeds of IDDSI 0 thin  Medication: Crushed in puree as possible  - family agreeable to PEG tube-->patient denies at this time    Palliative Care Consulted  - son would like code status changed to DNR CC  - would like to proceed with STR with palliative care  - family would like [facility], however d/t + COVID status they are unable to accept unless greater than 21 days from first detection     DVT Prophylaxis: Lovenox qd- given low weight   Diet: Diet NPO Effective Now  Code Status: Full Code -->family reports wanting DNR CC but patient expressed wanting Full code     Venous duplex of LUE pending. PT/OT.  Will keep full code and hold on PEG tube placement based on patient wishes at this time    Benja Briones PA-C  12/17/2020 1:46 PM     Agree with above  Changes made to note    Troy Zamarripa MD 12/17/2020 6:56 PM

## 2020-12-17 NOTE — PROGRESS NOTES
Assessment complete as per flow sheets. VSS. Patient is A/O x 1- self only (history of dementia). She is calm and cooperative at this time. Unlabored breathing at rest on room air. Lungs are diminished throughout on auscultation. SpO2 in mid 90s. Per RN report and orders, if patient's SpO2 desaturates, will place back on Airvo for humidification. Normal sinus/sinus tiffany rhythm on cardiac monitor, HR 50-70s. Bowel sounds + x4 quads. Patient does not appear to be in pain. She is resting quietly in bed and appears comfortable, repositioned and comfort measures provided. Patient voids per sandoval catheter. UO is dark and hazy in appearance. Line appears patent and is secured via statlock. Triple lumen PICC to RUE appears WNL. Dressing is C/D/I. D10% with 0.9NS infusing as per MAR. Meds as per MAR. Safety measures in place and will continue to monitor.

## 2020-12-17 NOTE — PROGRESS NOTES
Report and transfer of care received from [facility]. Care assumed by Linnea Colon.

## 2020-12-17 NOTE — PROGRESS NOTES
Inpatient Occupational Therapy  ReEvaluation and Treatment    Unit: [facility]  Date:  12/17/2020  Patient Name:    Camille Anne  Admitting diagnosis:  Failure to thrive in adult [R62.7]  Acute respiratory failure with hypoxia ([facility] [address] [J96.01]  Admit Date:  12/1/2020  Precautions/Restrictions/WB Status/ Lines/ Wounds/ Oxygen: fall risk, sandoval catheter , supplemental O2 (1.5L), confusion, telemetry, continuous pulse ox and Droplet Plus precautions (+ COVID 19)    Treatment Time: 1355- 1430  Treatment Number: 1     Billable Treatment Time: 25 minutes   Total Treatment Time:   35   minutes    Patient Goals for Therapy:  Did not verbalize goals      Discharge Recommendations: SNF  DME needs for discharge: defer to facility       Therapy recommendations for staff:   Assist of 2 bed mobility    History of Present Illness: per H&P70 y.o. female with reported history of dementia, reported history of alcohol abuse, prior admission to hospital for FTT (2013), gastric ulcer (2013) who presented to [facility] ED with complaint of \"failure to thrive\". Patient is awake on my exam, she will tell me her name but she cannot tell me where she is or the month of the year. She cannot provide any history due to her mentation. ER provider spoke with the patient's son who stated she stopped eating three for 4 days ago and would only take little sips of water. Family sent her to the ER for further evaluation due to this.     In the ER she was afebrile, borderline low blood pressure, hypoxic on room air requiring 4 L of nasal cannula oxygen. She does not use home oxygen. Her rapid Covid test is positive. Chest x-ray showed asymmetric groundglass opacifications in the right lower lung.   Initial potassium was 6.2 (?hemolyzed), her nurse tells me she received partial doses of insulin and calcium gluconate as well as IV fluids, repeat potassium level 3.8.     When I saw her in the emergency department she was lethargic, continually removing her oxygen with subsequent desaturations to the 70s. When wearing 4 L nasal cannula her oxygen saturation was stable. I later discussed with ER nurse, she was subsequently intubated for inability to be compliant with nasal cannula oxygen. She will be admitted to the ICU  Extubated 12/10  12/11 - PT/OT ordered  1212 - pt transferred to [facility] ICU overflow  12/17 - PT/OT re-ordered, pt's son choose to go with STR but will go with palliative consult if pt does not improve    Home Health S4 Level Recommendation:  NA  AM-PAC Score: AM-PAC Inpatient Daily Activity Raw Score: 9    Preadmission Environment  (Information gathered from 's note dated 12/2/2020). Patient was poor historian and unable to hold meaningful conversation. Spoke with CM. They will contact family for additional information on pt's prior level of status as they have a secured voicemail family may call back on. Pt. Lives with family (son and daughter in law)  Home environment:    Home (unknown how many storied)  Steps to enter first floor: 4 steps to enter  Steps to second floor: unknown  Bathroom: unknown  Equipment owned: unknown     Preadmission Status:  Pt. Able to drive: Unknown  Pt Fully independent with ADLs: Unknown  Pt. Required assistance from family for: unknown   History of falls Unknown    Pain  No  Rating:NA  Location:  Pain Medicine Status: No request made      Cognition    A&O Person only, not situation, time, or place  Able to follow 1 step commands inconsistently   Patient has hx of dementia, was verbal during session, but did not recall where she was after reoriented to place. Subjective  Patient lying supine in bed with no family present - no visitors present due to COVID-19 restrictions  Pt agreeable to this OT eval & tx.      Upper Extremity ROM:    Impaired R WFLs during bed mobility  Impaired L unable to perform shld flexion due to hx of deformity after humerus fx    Upper Extremity Strength:    BUE strength 15 reps with minimal cues    Rehabilitation Potential:  Fair for goals listed above. Strengths for achieving goals include: Family Support  Barriers to achieving goals include:  Complexity of condition, Weakness and Cognition     Plan: To be seen 3-5 x/wk while in acute care setting for therapeutic exercises, bed mobility, transfers, dressing, bathing, family/patient education, ADL/IADL retraining, energy conservation training.      Erica Patterson, OTR/L [id number]    If patient discharges from this facility prior to next visit, this note will serve as the Discharge Summary

## 2020-12-18 LAB
ANION GAP SERPL CALCULATED.3IONS-SCNC: 7 MMOL/L (ref 3–16)
BASOPHILS ABSOLUTE: 0.2 K/UL (ref 0–0.2)
BASOPHILS RELATIVE PERCENT: 3.4 %
BUN BLDV-MCNC: 8 MG/DL (ref 7–20)
CALCIUM SERPL-MCNC: 8.6 MG/DL (ref 8.3–10.6)
CHLORIDE BLD-SCNC: 101 MMOL/L (ref 99–110)
CO2: 30 MMOL/L (ref 21–32)
CREAT SERPL-MCNC: <0.5 MG/DL (ref 0.6–1.2)
EKG ATRIAL RATE: 125 BPM
EKG DIAGNOSIS: NORMAL
EKG P AXIS: 84 DEGREES
EKG P-R INTERVAL: 126 MS
EKG Q-T INTERVAL: 316 MS
EKG QRS DURATION: 70 MS
EKG QTC CALCULATION (BAZETT): 456 MS
EKG R AXIS: 83 DEGREES
EKG T AXIS: -74 DEGREES
EKG VENTRICULAR RATE: 125 BPM
EOSINOPHILS ABSOLUTE: 0.1 K/UL (ref 0–0.6)
EOSINOPHILS RELATIVE PERCENT: 1.1 %
GFR AFRICAN AMERICAN: >60
GFR NON-AFRICAN AMERICAN: >60
GLUCOSE BLD-MCNC: 104 MG/DL (ref 70–99)
GLUCOSE BLD-MCNC: 111 MG/DL (ref 70–99)
GLUCOSE BLD-MCNC: 119 MG/DL (ref 70–99)
GLUCOSE BLD-MCNC: 120 MG/DL (ref 70–99)
GLUCOSE BLD-MCNC: 77 MG/DL (ref 70–99)
GLUCOSE BLD-MCNC: 95 MG/DL (ref 70–99)
HCT VFR BLD CALC: 35.9 % (ref 36–48)
HEMOGLOBIN: 11.7 G/DL (ref 12–16)
LYMPHOCYTES ABSOLUTE: 0.7 K/UL (ref 1–5.1)
LYMPHOCYTES RELATIVE PERCENT: 14 %
MCH RBC QN AUTO: 25.9 PG (ref 26–34)
MCHC RBC AUTO-ENTMCNC: 32.6 G/DL (ref 31–36)
MCV RBC AUTO: 79.5 FL (ref 80–100)
MONOCYTES ABSOLUTE: 0.3 K/UL (ref 0–1.3)
MONOCYTES RELATIVE PERCENT: 5.9 %
NEUTROPHILS ABSOLUTE: 3.9 K/UL (ref 1.7–7.7)
NEUTROPHILS RELATIVE PERCENT: 75.6 %
PDW BLD-RTO: 30.4 % (ref 12.4–15.4)
PERFORMED ON: ABNORMAL
PERFORMED ON: NORMAL
PERFORMED ON: NORMAL
PLATELET # BLD: 110 K/UL (ref 135–450)
PMV BLD AUTO: 8.9 FL (ref 5–10.5)
POTASSIUM SERPL-SCNC: 3.8 MMOL/L (ref 3.5–5.1)
RBC # BLD: 4.52 M/UL (ref 4–5.2)
SODIUM BLD-SCNC: 138 MMOL/L (ref 136–145)
WBC # BLD: 5.2 K/UL (ref 4–11)

## 2020-12-18 PROCEDURE — 6370000000 HC RX 637 (ALT 250 FOR IP): Performed by: INTERNAL MEDICINE

## 2020-12-18 PROCEDURE — 6360000002 HC RX W HCPCS: Performed by: INTERNAL MEDICINE

## 2020-12-18 PROCEDURE — 2060000000 HC ICU INTERMEDIATE R&B

## 2020-12-18 PROCEDURE — 80048 BASIC METABOLIC PNL TOTAL CA: CPT

## 2020-12-18 PROCEDURE — 2580000003 HC RX 258: Performed by: INTERNAL MEDICINE

## 2020-12-18 PROCEDURE — 97530 THERAPEUTIC ACTIVITIES: CPT

## 2020-12-18 PROCEDURE — 99233 SBSQ HOSP IP/OBS HIGH 50: CPT | Performed by: INTERNAL MEDICINE

## 2020-12-18 PROCEDURE — 6370000000 HC RX 637 (ALT 250 FOR IP): Performed by: HOSPITALIST

## 2020-12-18 PROCEDURE — 36592 COLLECT BLOOD FROM PICC: CPT

## 2020-12-18 PROCEDURE — 93010 ELECTROCARDIOGRAM REPORT: CPT | Performed by: INTERNAL MEDICINE

## 2020-12-18 PROCEDURE — 36415 COLL VENOUS BLD VENIPUNCTURE: CPT

## 2020-12-18 PROCEDURE — 99232 SBSQ HOSP IP/OBS MODERATE 35: CPT | Performed by: INTERNAL MEDICINE

## 2020-12-18 PROCEDURE — 93005 ELECTROCARDIOGRAM TRACING: CPT | Performed by: HOSPITALIST

## 2020-12-18 PROCEDURE — C9113 INJ PANTOPRAZOLE SODIUM, VIA: HCPCS | Performed by: INTERNAL MEDICINE

## 2020-12-18 PROCEDURE — 85025 COMPLETE CBC W/AUTO DIFF WBC: CPT

## 2020-12-18 RX ORDER — DEXAMETHASONE SODIUM PHOSPHATE 4 MG/ML
3 INJECTION, SOLUTION INTRA-ARTICULAR; INTRALESIONAL; INTRAMUSCULAR; INTRAVENOUS; SOFT TISSUE DAILY
Status: DISCONTINUED | OUTPATIENT
Start: 2020-12-19 | End: 2020-12-21

## 2020-12-18 RX ORDER — QUETIAPINE FUMARATE 25 MG/1
12.5 TABLET, FILM COATED ORAL ONCE
Status: COMPLETED | OUTPATIENT
Start: 2020-12-18 | End: 2020-12-18

## 2020-12-18 RX ADMIN — QUETIAPINE FUMARATE 25 MG: 25 TABLET ORAL at 01:00

## 2020-12-18 RX ADMIN — PANTOPRAZOLE SODIUM 40 MG: 40 INJECTION, POWDER, FOR SOLUTION INTRAVENOUS at 09:04

## 2020-12-18 RX ADMIN — QUETIAPINE FUMARATE 12.5 MG: 25 TABLET ORAL at 20:36

## 2020-12-18 RX ADMIN — ENOXAPARIN SODIUM 30 MG: 30 INJECTION SUBCUTANEOUS at 09:03

## 2020-12-18 RX ADMIN — Medication 10 ML: at 09:04

## 2020-12-18 RX ADMIN — DEXAMETHASONE SODIUM PHOSPHATE 4 MG: 4 INJECTION, SOLUTION INTRAMUSCULAR; INTRAVENOUS at 09:03

## 2020-12-18 RX ADMIN — FOLIC ACID 1 MG: 1 TABLET ORAL at 09:04

## 2020-12-18 RX ADMIN — DEXTROSE MONOHYDRATE: 25 INJECTION, SOLUTION INTRAVENOUS at 09:04

## 2020-12-18 NOTE — PROGRESS NOTES
Shift assessment completed. Pt only oriented to self but is alert. Bilateral lung sounds are clear/diminished. Pt sitting up in bed enjoying a beverage. Bernard intact. Fluids running @ 60 ml/hr. Pt repositioned. Son given an update over phone and allowed to speak with Pt. Call light within reach, will continue to monitor.

## 2020-12-18 NOTE — PROGRESS NOTES
Pulmonary Progress Note      CC: COVID-19, hypoxemia, AMS    Subjective: Tachyarrhythmia -sinus tach on EKG, was agitated at the time, now looks good and feels good she says. This is the best I have seen her by far. Invasive Lines:   12/4/2020 PICC    MV: 12/1/2020-12/10/2020; reintubated on 12/12/2020 for severe hypoxemia/presumably secondary to secretions  Vent Mode: AC/VC Rate Set: 20 bmp/Vt Ordered: 290 mL/ /FiO2 : 25 %  No results for input(s): PHART, ONV9LVG, PO2ART in the last 72 hours. IV:   IV infusion builder 60 mL/hr at 12/17/20 1247    norepinephrine      dextrose         EXAM:  BP (!) 163/98   Pulse 116   Temp 96.5 °F (35.8 °C) (Temporal)   Resp 14   Ht 5' 2\" (1.575 m)   Wt 62 lb 6.4 oz (28.3 kg)   SpO2 100%   BMI 11.41 kg/m²  Room air    Intake/Output Summary (Last 24 hours) at 12/18/2020 0814  Last data filed at 12/18/2020 0258  Gross per 24 hour   Intake 911 ml   Output 1320 ml   Net -409 ml     EXAM    Constitutional:  No acute distress. Cachectic  HENT:  Oropharynx is dry  Neck: No tracheal deviation present. Cardiovascular: Normal heart sounds. No lower extremity edema. Pulmonary/Chest: No wheezes. No rhonchi. + rales. + decreased breath sounds. No accessory muscle usage or stridor. Musculoskeletal: No cyanosis. No clubbing. Skin: Skin is warm and dry. Psychiatric: Normal mood and affect.   Neurologic: speech fluent, alert and oriented to person, strength symmetric       Medications:   dexamethasone  4 mg Intravenous Daily    enoxaparin  30 mg Subcutaneous Daily    pantoprazole  40 mg Intravenous Daily    sodium chloride flush  10 mL Intravenous 2 times per day    folic acid  1 mg Oral Daily     PRN Meds:  midazolam, norepinephrine, iopamidol, ipratropium-albuterol, sodium chloride, sodium chloride flush, acetaminophen **OR** acetaminophen, polyethylene glycol, promethazine **OR** ondansetron, glucose, dextrose, glucagon (rDNA), dextrose, potassium chloride **OR** potassium alternative oral replacement **OR** potassium chloride, magnesium sulfate    Results:  CBC:   Recent Labs     12/16/20  0530 12/17/20  0613 12/18/20  0518   WBC 5.6 5.4 5.2   HGB 11.8* 11.6* 11.7*   HCT 37.1 35.9* 35.9*   MCV 79.5* 79.3* 79.5*    123* 110*     BMP:   Recent Labs     12/16/20  0530 12/17/20  0613 12/18/20  0518   * 134* 138   K 3.4* 3.3* 3.8   CL 92* 98* 101   CO2 24 30 30   BUN 11 15 8   CREATININE <0.5* <0.5* <0.5*     LIVER PROFILE:   No results for input(s): AST, ALT, LIPASE, BILIDIR, BILITOT, ALKPHOS in the last 72 hours. Invalid input(s): AMYLASE,  ALB  PT/INR: No results for input(s): PROTIME, INR in the last 72 hours. APTT:   No results for input(s): APTT in the last 72 hours. UA:  No results for input(s): NITRITE, COLORU, PHUR, LABCAST, WBCUA, RBCUA, MUCUS, TRICHOMONAS, YEAST, BACTERIA, CLARITYU, SPECGRAV, LEUKOCYTESUR, UROBILINOGEN, BILIRUBINUR, BLOODU, GLUCOSEU, AMORPHOUS in the last 72 hours. Invalid input(s): [employer]    Cultures:  12/1/2020 SARS-CoV-2 positive  12/2/2020 respiratory culture NRF  12/2/2020 urine no growth  12/12/2020 tracheal aspirate sent    Films:  CXR 12/8/2020 ETT okay  CXR 12/12/2020 small bilateral effusions, largely unchanged  12/17/20 UE doppler no DVT      ASSESSMENT:  · Acute hypoxemic respiratory failure -hyper acutely worsened today, from 1.5 L to nonrebreather. Favor inability to clear thick secretions.   Now has been stable on 2 L  · COVID-19 pneumonia  · Cachexia/failure to thrive  · Acute metabolic encephalopathy superimposed on underlying dementia  · Acute anemia -S/P 2 units PRBC  · Possible brief asystolic arrest early am 29/7/88 with unclear etiology - 30 seconds before ROSC     PLAN:  · Decadron taper  4 mg, lower to 3  · Completed 5 days Remdesevir; s/p 1 U CCP 12/3/2020  · Completed 7 days ceftriaxone and 5 days azithromycin  · S/P 3 U PRBC    · D/C planning  · MINERVA Villanueva (son)

## 2020-12-18 NOTE — PROGRESS NOTES
Bedside report and Pt care transferred to [facility]. Pt denies any assistance at this time.

## 2020-12-18 NOTE — CARE COORDINATION
INTERDISCIPLINARY PLAN OF CARE CONFERENCE    Date/Time: 12/18/2020 9:57 AM  Completed by: Nelson GUTIERREZ, HAMW. Case Management      Patient Name:  Carmelita Dove  YOB: 1950  Admitting Diagnosis: Failure to thrive in adult [R62.7]  Acute respiratory failure with hypoxia ([facility] [address] [J96.01]     Admit Date/Time:  12/1/2020  5:50 AM    Chart reviewed. Interdisciplinary team contacted or reviewed plan related to patient progress and discharge plans. Disciplines included Case Management, Nursing, and Dietitian. Current Status:ongoing  PT/OT recommendation for discharge plan of care: SNF    Expected D/C Disposition:  Skilled nursing facility  Confirmed plan with patient and/or family Yes confirmed with: pt's son Poly Sánchez via phone call. Discharge Plan Comments: Chart review completed. Spoke with Cayetano Germain who stated pt is only to self and to call family. Called and spoke with Poly Sánchez, pt's son. He confirms the plan is for SNF. He wants [facility] or [facility] if possible. Explained that [facility] is reviewing and they can't accept until day 21. He is aware pt also needs to be medically ready for discharge per MD and he stated understanding. He denied needs or questions from CM. Spoke with Seymour Dasilva, admissions at [facility] to follow up on referral. She is aware that therapy saw pt yesterday and are recommending SNF. She is also aware that ST worked with her yesterday and has recommended a diet. Seymour Dasilva stated that she would have Carlos Arora review and follow up with writer on their decision. Home O2 in place on admit: No    If [facility] able to accept, they are not able to accept until 21 days out from the positive covid-19 test which would be 12/21/2020. CM will follow. Please notify CM if needs or concerns arise.     Addendum at 2:13pm: Message left for Seymour Dasilva, admissions at [facility] to get an update on the pending referral.    Addendum at 2:27pm: Received call back from [place] at [facility] stating they are still reviewing pt and want to review her through the weekend as pt is currently in restraints. Pt must be restraint free for at least 24 hours.  [place] [facility] stated they would review pt again on Monday to see if she is out of restraints and review how pt did this weekend

## 2020-12-18 NOTE — PROGRESS NOTES
Pt repeatedly pulling on sandoval and attempting to get out of bed. Pt is unable to be redirected. Perfect serve sent in request for PRN- bilateral restraints applied. Order in chart. Will continue to monitor.

## 2020-12-18 NOTE — FLOWSHEET NOTE
12/18/20 0815   Vitals   Temp 97.1 °F (36.2 °C)   Temp Source Temporal   Pulse 98   Heart Rate Source Monitor   Resp 20   BP (!) 165/95   BP Location Left lower arm   Patient Position Semi fowlers   Level of Consciousness Alert (0)   MEWS Score 1   Oxygen Therapy   SpO2 100 %   Pulse Oximeter Device Mode Continuous   Pulse Oximeter Device Location Finger   O2 Device None (Room air)     Vital signs stable. Pt is alert and oriented to self only per baseline dementia and denies any worsening pain or SOB at the moment. Nothing new noted on head to toe assessment. Bilateral wrist restraints remain in place due to confusion/pulling at lines. Bernard remains secure in place and draining clear, yellow urine. Pt continues to maintain her o2 sats above 90% on RA. Pt is NSR on the monitor. Morning medication administration completed. D10 in normal saline continues to infuse at 60 ml/hr. Pt repositioned in bed for comfort. Pt denies any further assistance at the moment. Will continue to monitor.

## 2020-12-19 LAB
ANION GAP SERPL CALCULATED.3IONS-SCNC: 6 MMOL/L (ref 3–16)
BASOPHILS ABSOLUTE: 0 K/UL (ref 0–0.2)
BASOPHILS RELATIVE PERCENT: 1.6 %
BUN BLDV-MCNC: 13 MG/DL (ref 7–20)
CALCIUM SERPL-MCNC: 8.4 MG/DL (ref 8.3–10.6)
CHLORIDE BLD-SCNC: 98 MMOL/L (ref 99–110)
CO2: 30 MMOL/L (ref 21–32)
CREAT SERPL-MCNC: <0.5 MG/DL (ref 0.6–1.2)
EOSINOPHILS ABSOLUTE: 0.1 K/UL (ref 0–0.6)
EOSINOPHILS RELATIVE PERCENT: 2.5 %
GFR AFRICAN AMERICAN: >60
GFR NON-AFRICAN AMERICAN: >60
GLUCOSE BLD-MCNC: 134 MG/DL (ref 70–99)
GLUCOSE BLD-MCNC: 139 MG/DL (ref 70–99)
GLUCOSE BLD-MCNC: 156 MG/DL (ref 70–99)
GLUCOSE BLD-MCNC: 86 MG/DL (ref 70–99)
GLUCOSE BLD-MCNC: 90 MG/DL (ref 70–99)
HCT VFR BLD CALC: 31.7 % (ref 36–48)
HEMOGLOBIN: 10.3 G/DL (ref 12–16)
LYMPHOCYTES ABSOLUTE: 0.6 K/UL (ref 1–5.1)
LYMPHOCYTES RELATIVE PERCENT: 25.6 %
MAGNESIUM: 1.7 MG/DL (ref 1.8–2.4)
MCH RBC QN AUTO: 25.8 PG (ref 26–34)
MCHC RBC AUTO-ENTMCNC: 32.4 G/DL (ref 31–36)
MCV RBC AUTO: 79.7 FL (ref 80–100)
MONOCYTES ABSOLUTE: 0.3 K/UL (ref 0–1.3)
MONOCYTES RELATIVE PERCENT: 11 %
NEUTROPHILS ABSOLUTE: 1.4 K/UL (ref 1.7–7.7)
NEUTROPHILS RELATIVE PERCENT: 59.3 %
PDW BLD-RTO: 30.4 % (ref 12.4–15.4)
PERFORMED ON: ABNORMAL
PERFORMED ON: NORMAL
PLATELET # BLD: 92 K/UL (ref 135–450)
PMV BLD AUTO: 9 FL (ref 5–10.5)
POTASSIUM SERPL-SCNC: 3 MMOL/L (ref 3.5–5.1)
RBC # BLD: 3.98 M/UL (ref 4–5.2)
SODIUM BLD-SCNC: 134 MMOL/L (ref 136–145)
WBC # BLD: 2.4 K/UL (ref 4–11)

## 2020-12-19 PROCEDURE — 2060000000 HC ICU INTERMEDIATE R&B

## 2020-12-19 PROCEDURE — 80048 BASIC METABOLIC PNL TOTAL CA: CPT

## 2020-12-19 PROCEDURE — 85025 COMPLETE CBC W/AUTO DIFF WBC: CPT

## 2020-12-19 PROCEDURE — 97530 THERAPEUTIC ACTIVITIES: CPT

## 2020-12-19 PROCEDURE — 83735 ASSAY OF MAGNESIUM: CPT

## 2020-12-19 PROCEDURE — 6370000000 HC RX 637 (ALT 250 FOR IP): Performed by: INTERNAL MEDICINE

## 2020-12-19 PROCEDURE — 92526 ORAL FUNCTION THERAPY: CPT

## 2020-12-19 PROCEDURE — 6360000002 HC RX W HCPCS: Performed by: INTERNAL MEDICINE

## 2020-12-19 PROCEDURE — C9113 INJ PANTOPRAZOLE SODIUM, VIA: HCPCS | Performed by: INTERNAL MEDICINE

## 2020-12-19 PROCEDURE — 2580000003 HC RX 258: Performed by: INTERNAL MEDICINE

## 2020-12-19 PROCEDURE — 99232 SBSQ HOSP IP/OBS MODERATE 35: CPT | Performed by: INTERNAL MEDICINE

## 2020-12-19 PROCEDURE — 6360000002 HC RX W HCPCS: Performed by: PHYSICIAN ASSISTANT

## 2020-12-19 PROCEDURE — 36415 COLL VENOUS BLD VENIPUNCTURE: CPT

## 2020-12-19 PROCEDURE — 97535 SELF CARE MNGMENT TRAINING: CPT

## 2020-12-19 RX ORDER — MAGNESIUM SULFATE 1 G/100ML
1 INJECTION INTRAVENOUS ONCE
Status: COMPLETED | OUTPATIENT
Start: 2020-12-19 | End: 2020-12-19

## 2020-12-19 RX ADMIN — Medication 10 MEQ: at 12:21

## 2020-12-19 RX ADMIN — Medication 10 ML: at 10:12

## 2020-12-19 RX ADMIN — Medication 10 MEQ: at 13:30

## 2020-12-19 RX ADMIN — FOLIC ACID 1 MG: 1 TABLET ORAL at 10:13

## 2020-12-19 RX ADMIN — Medication 10 MEQ: at 11:09

## 2020-12-19 RX ADMIN — Medication 10 MEQ: at 14:33

## 2020-12-19 RX ADMIN — DEXAMETHASONE SODIUM PHOSPHATE 3 MG: 4 INJECTION, SOLUTION INTRAMUSCULAR; INTRAVENOUS at 10:11

## 2020-12-19 RX ADMIN — MAGNESIUM SULFATE HEPTAHYDRATE 1 G: 1 INJECTION, SOLUTION INTRAVENOUS at 15:57

## 2020-12-19 RX ADMIN — Medication 10 MEQ: at 17:33

## 2020-12-19 RX ADMIN — DEXTROSE MONOHYDRATE: 25 INJECTION, SOLUTION INTRAVENOUS at 05:43

## 2020-12-19 RX ADMIN — PANTOPRAZOLE SODIUM 40 MG: 40 INJECTION, POWDER, FOR SOLUTION INTRAVENOUS at 10:13

## 2020-12-19 NOTE — PROGRESS NOTES
Patient tolerating IV k+ and mag well. Sitting in bed with eyes open. Feeding self after set up from staff. She has continued to be compliant with not pulling at lines. Bernard continues to drain clear yellow urine. Denies any pain or discomfort. Call light and bedside table within reach. Will continue to monitor.

## 2020-12-19 NOTE — PROGRESS NOTES
Pulmonary Progress Note      CC: COVID-19, hypoxemia, AMS    Subjective:    No complaints. Sitting up eating with speech, they are upgrading her diet. Invasive Lines:   12/4/2020 PICC    MV: 12/1/2020-12/10/2020; reintubated on 12/12/2020 for severe hypoxemia/presumably secondary to secretions  Vent Mode: AC/VC Rate Set: 20 bmp/Vt Ordered: 290 mL/ /FiO2 : 25 %  No results for input(s): PHART, OHE8STQ, PO2ART in the last 72 hours. IV:   IV infusion builder 60 mL/hr at 12/19/20 0543    norepinephrine      dextrose         EXAM:  /84   Pulse 70   Temp 97.9 °F (36.6 °C) (Axillary)   Resp 19   Ht 5' 2\" (1.575 m)   Wt 62 lb 4.8 oz (28.3 kg)   SpO2 99%   BMI 11.39 kg/m²  Room air    Intake/Output Summary (Last 24 hours) at 12/19/2020 0809  Last data filed at 12/19/2020 0545  Gross per 24 hour   Intake 1912 ml   Output 2100 ml   Net -188 ml     EXAM    Constitutional:  No acute distress. Cachectic. This is the best I've seen her. HENT:  Oropharynx is dry  Neck: No tracheal deviation present. Cardiovascular: Normal heart sounds. No lower extremity edema. Pulmonary/Chest: No wheezes. No rhonchi. + rales. + decreased breath sounds. No accessory muscle usage or stridor. Musculoskeletal: No cyanosis. No clubbing. Skin: Skin is warm and dry. Psychiatric: Normal mood and affect.   Neurologic: speech fluent, alert and oriented to person, strength symmetric       Medications:   dexamethasone  3 mg Intravenous Daily    enoxaparin  30 mg Subcutaneous Daily    pantoprazole  40 mg Intravenous Daily    sodium chloride flush  10 mL Intravenous 2 times per day    folic acid  1 mg Oral Daily     PRN Meds:  midazolam, norepinephrine, iopamidol, ipratropium-albuterol, sodium chloride, sodium chloride flush, acetaminophen **OR** acetaminophen, polyethylene glycol, promethazine **OR** ondansetron, glucose, dextrose, glucagon (rDNA), dextrose, potassium chloride **OR** potassium alternative oral replacement **OR** potassium chloride, magnesium sulfate    Results:  CBC:   Recent Labs     12/17/20  0613 12/18/20  0518 12/19/20  0513   WBC 5.4 5.2 2.4*   HGB 11.6* 11.7* 10.3*   HCT 35.9* 35.9* 31.7*   MCV 79.3* 79.5* 79.7*   * 110* 92*     BMP:   Recent Labs     12/17/20  0613 12/18/20  0518 12/19/20  0513   * 138 134*   K 3.3* 3.8 3.0*   CL 98* 101 98*   CO2 30 30 30   BUN 15 8 13   CREATININE <0.5* <0.5* <0.5*     LIVER PROFILE:   No results for input(s): AST, ALT, LIPASE, BILIDIR, BILITOT, ALKPHOS in the last 72 hours. Invalid input(s): AMYLASE,  ALB  PT/INR: No results for input(s): PROTIME, INR in the last 72 hours. APTT:   No results for input(s): APTT in the last 72 hours. UA:  No results for input(s): NITRITE, COLORU, PHUR, LABCAST, WBCUA, RBCUA, MUCUS, TRICHOMONAS, YEAST, BACTERIA, CLARITYU, SPECGRAV, LEUKOCYTESUR, UROBILINOGEN, BILIRUBINUR, BLOODU, GLUCOSEU, AMORPHOUS in the last 72 hours. Invalid input(s): [facility]    Cultures:  12/1/2020 SARS-CoV-2 positive  12/2/2020 respiratory culture NRF  12/2/2020 urine no growth  12/12/2020 tracheal aspirate sent    Films:  CXR 12/8/2020 ETT okay  CXR 12/12/2020 small bilateral effusions, largely unchanged  12/17/20 UE doppler no DVT      ASSESSMENT:  · Acute hypoxemic respiratory failure -hyper acutely worsened 1 day after extubation, from 1.5 L to nonrebreather and had to be reintubated. Favor inability to clear thick secretions.   Now has been stable on room air  · COVID-19 pneumonia  · Cachexia/failure to thrive  · Acute metabolic encephalopathy superimposed on underlying dementia  · Acute anemia -S/P 2 units PRBC  · Electrolytes disorder  · Possible brief asystolic arrest early am 20/4/03 with unclear etiology - 30 seconds before ROSC     PLAN:  · Decadron taper at 3  · Completed 5 days Remdesevir; s/p 1 U CCP 12/3/2020  · Completed 7 days ceftriaxone and 5 days azithromycin  · S/P 3 U PRBC    · Electrolyte replacement per internal medicine  · Okay to discharge on Decadron taper  · MINERVA Jo (son)

## 2020-12-19 NOTE — PROGRESS NOTES
cannula her oxygen saturation was stable.  I later discussed with ER nurse, she was subsequently intubated for inability to be compliant with nasal cannula oxygen.  She will be admitted to the ICU  Extubated 12/10  12/11 - PT/OT ordered  1212 - pt transferred to [facility] ICU overflow  12/17 - PT/OT re-ordered, pt's son choose to go with STR but will go with palliative consult if pt does not improve    Home Health S4 Level Recommendation: NA  AM-PAC Mobility Score   AM-PAC Inpatient Mobility Raw Score : 8       Treatment Time:  13:10-13:35  Treatment number: 2  Timed Code Treatment Minutes: 25 minutes  Total Treatment Minutes:  25minutes    Cognition    A&O orientation not directly assessed. Able to follow 1 step commands inconsistently    Subjective  Patient lying supine in bed with no family present   Pt agreeable to this PT tx. Pt reports she is tired. Pt with difficulty following commands this date. Pt would report agreeable to complete tasks, then push away when provided. Continue to assess. Pain   No  Bed Mobility:   Supine to Sit:  Total A of 2  Sit to Supine: Total A of 2  Rolling: Total A   Scooting: Total A of 2     Transfer Training:   Declined transfer practice at this time  Sit to stand:                 Not Tested  Stand to sit:                 Not Tested  Bed to Chair:               Not Tested  Bed to [employer]:                Not Tested  Standard toilet:            Not Tested      Gait Training gait deferred due to difficulty with transfers; pt ambulated 0 ft. Stair Training deferred, pt unsafe/not appropriate to complete stairs at this time      Therapeutic Exercise Shena deferred secondary to pt declined    Balance  Sitting:  Poor; Max A  and 2 persons  Comments: poor trunk control,   Impulsively moving, limited sitting tolerance    Standing: Not tested;  Not Tested  Comments:     Patient Education      Role of PT, POC, Discharge recommendations, transfer techniques and calling for assist with mobility and purpose of visit. Positioning Needs       Pt in bed, unable to set alarm due to patient s body wi=eight, alarm not activated upon arrival, positioned in proper neutral alignment and pressure relief provided. Call light provided and all needs within reach   Activity Tolerance  Pt completed therapy session with SOB noted with all levels of activity. SpO2: >90% throughout session       Other  RN aware of  Tolerance to activity this session    Assessment :  Patient progress  Was  Limited by cognition this date. Continue to recommend  SNF at [facility] to continue with mobility progression. Recommending SNF upon discharge as patient functioning well below baseline, demonstrates good rehab potential and unable to return home due to burden of care beyond caregiver ability and home environment not conducive to patient recovery. Goals : To be met in 3 visits:  1). Independent with LE Ex x 10 reps  2). Roll L/R with min A and use of rail     To be met in 6 visits:  1). Supine to/from sit: Max A   2). Sit to/from stand: Max A   3). Bed to chair: Max A   4). Patient will be able to tolerated EOB sitting with Fair  sitting balance and Min A for 10 min. 5). Tolerate B LE exercises 3 sets of 10-15 reps  Plan   Continue with plan of care. Signature: Cesilia Pena, PT #[id number]    If patient discharges from this facility prior to next visit, this note will serve as the Discharge Summary.

## 2020-12-19 NOTE — PROGRESS NOTES
Notified MD of Mag level of 1.7 and plt of [id number]. States to give the lovenox with current platelet count. See MAR for new mag orders.

## 2020-12-19 NOTE — PROGRESS NOTES
Pt is lying in bed with their eyes closed. Respirations are easy and even. Call light within reach bed in lowest position with the wheels locked. Will continue to monitor.  Carolina Hubbard

## 2020-12-19 NOTE — PROGRESS NOTES
Speech Language Pathology Re-assessment  Facility/Department: [facility] SURGE OVERFLOW  Dysphagia Daily Treatment Note    Recommendations:  Solid Consistency: Upgrade to IDDSI 5 Minced and Moist  Liquid Consistency: IDDSI 0-thin liquids-no straws  Medication: Crushed in puree as possible  · Oral care 3x/day   · *In presence of reported failure to thrive and anorexia, consider addition of alternative means of nutrition d/t pt's ongoing poor PO intake and generalized weakness. NAME: Denilson Leahy  : 1950  MRN: [id number]    Patient Diagnosis(es):   Patient Active Problem List    Diagnosis Date Noted    Acute respiratory failure with hypoxia and hypercapnia ([address]     Severe protein-calorie malnutrition Janey Ric: less than 60% of standard weight) ([address] 2020    Anorexia     Acute metabolic encephalopathy     Pneumonia due to COVID-19 virus 2020    Acute respiratory failure with hypoxemia ([address] 2020    Acute respiratory failure with hypoxia ([address] 2020    COVID-19 virus detected     Hyperkalemia     Sepsis with acute hypercapnic respiratory failure without septic shock ([address]     Closed fracture of left proximal humerus 2018    Closed fracture of left distal humerus 2018    Pulmonary nodules 2017    Dementia ([address] 2013    Gastric ulcer 2013    BELKYS (acute kidney injury) ([address] 2013    Failure to thrive 2013    Alcohol abuse 2013     Allergies: Allergies   Allergen Reactions    No Known Allergies      Onset Date: The patient was admitted to [facility] on 2020  Subjective: Pt seen upright in bed, alert and cooperative. Appears confused at baseline. RN OK'd SLP entry and therapy. Pain: no c/o pain    Current Diet: Dietary Nutrition Supplements: Standard High Calorie Oral Supplement  DIET DYSPHAGIA MINCED AND MOIST;  Dysphagia Minced and Moist; No Drinking Straw    Diet Tolerance:  RN reporting no clinical s/s aspiration on current diet level. Declined breakfast.    P.O. Trials: Thin   x X 4 oz by cup (self-controlled)       Nectar / Mildly Thick       Honey / Moderately Thick       Pudding / Extremely Thick       Puree   x x15 bites (breakfas-eggs and waffle)   Solid   x X 3 dental soft (ej cracker dipped in applesauce)     Dysphagia Treatment and Impressions:  · The patient is alert and agreeable to participate. In bilateral wrist restraints. · The patient has SPO2% of 96& on RA with RR of 20  · SLP elevated HOB to upright position without difficulty  · Oral mucosa is pink and dry. Vocal quality is normal. No coughing or throat clearing at baseline. · IDDSI 0 (thin): Mild wet vocal quality and delayed throat clear x 1 post rinse of dental soft. No other clinical s/s of aspiration. Swallow appearing mildly delayed with thin liquids. No notable change in vital signs. · IDDSI 4 (puree): Able to consume larger bolus this date. Mild-mod prolonged mastication and AP transit. Independent oral clearance with use of lingual sweeps. Accepted PO well when fed. · IDDSI 5 (minced and moist): Similar oral phase with dental soft as was noted with puree. Mild-mod prolonged mastication and AP transit. Good oral clearance in b/w bites. · Assessment: Improved stamina for PO intake. Min cues for smaller drinks. No notable change in vital signs throughout. Improved swallow prognosis. · Recommendations: Rec: upgrade to IDDSI 5 minced and moist, con't IDDSI 0 thin liquids, no straws. Meds crushed in puree as possible. Ensure upright posture for all PO intake. Recommend assistance with feeding. Standby recommendations for additional alternative means of nutrition given reported failure to thrive and anorexia, in the presence of ongoing poor PO intake.    Dysphagia Goals:  Timeframe for Long-term Goals: 7 days (12/17/2020)  Goal 1: The patient will tolerate least restrictive diet with no clinical s/s of aspiration for optimal nuitrition and hydration  12/19: ongoing, advance to minced and moist and thin liquids / no straws, crushable meds crushed in puree     Short-term Goals  Timeframe for Short-term Goals: 4 days (12/14/2020)  Goal 1: The patient will tolerate recommended diet with no clinical s/s of aspiration 5/5 12/19: ongoing, progressing. See above  Goal 2: The patient will tolerate therapeutic diet upgrade trials with no clinical s/s of aspiration 5/5 12/19 ongoing, see above  Goal 3: The patient will demonstrate understanding of recommended compensatory strategies, given min cues  12/19: No evidence of learning, ongoing     Patient/Family/Caregiver Education:   SLP re: role of ST, aspiration precautions. Pt did not demonstrate evidence of learning, needs reinforcement. Compensatory Strategies:  With pleasure feeds: HOB 90* and 30\" after meals; small bites/sips; alternate solids/liquids every 3-5 bites; oral care after every meal; no straws    Plan:    Continued Dysphagia treatment with goals per plan of care. Discharge Recommendations: TBD pending acute care progress    If pt discharges from hospital prior to Speech/Swallowing discharge, this note serves as tx and discharge summary.      Total Treatment Time / Charges     Time in Time out Total Time / units   Cognitive Tx         Speech Tx      Dysphagia Tx 0920 0999 35 min / 1 unit     Signature:  Alfredito Elam MS-[facility]-SLP Jose Conroy Pathologist  Phone: [phone]

## 2020-12-19 NOTE — PROGRESS NOTES
Hospitalist Progress Note      PCP: No primary care provider on file. Date of Admission: 12/1/2020     Admitted with Covid pneumonia, acute respiratory failure and altered mental status    S/p intubation and mechanical ventilation 12/1-> 12/10  reintubated on 12/12/2020 and extubated today 1214    Subjective:   55 Zuri Anderson is doing better. DCP working on discharge. Afebrile, on RA      Medications:  Reviewed    Infusion Medications    IV infusion builder 60 mL/hr at 12/19/20 0543    norepinephrine      dextrose       Scheduled Medications    dexamethasone  3 mg Intravenous Daily    enoxaparin  30 mg Subcutaneous Daily    pantoprazole  40 mg Intravenous Daily    sodium chloride flush  10 mL Intravenous 2 times per day    folic acid  1 mg Oral Daily     PRN Meds: midazolam, norepinephrine, iopamidol, ipratropium-albuterol, sodium chloride, sodium chloride flush, acetaminophen **OR** acetaminophen, polyethylene glycol, promethazine **OR** ondansetron, glucose, dextrose, glucagon (rDNA), dextrose, potassium chloride **OR** potassium alternative oral replacement **OR** potassium chloride, magnesium sulfate      Intake/Output Summary (Last 24 hours) at 12/19/2020 1450  Last data filed at 12/19/2020 1410  Gross per 24 hour   Intake 1560 ml   Output 2075 ml   Net -515 ml       Physical Exam Performed:    /71   Pulse 80   Temp 97.5 °F (36.4 °C) (Temporal)   Resp 18   Ht 5' 2\" (1.575 m)   Wt 62 lb 4.8 oz (28.3 kg)   SpO2 94%   BMI 11.39 kg/m²      General: thin eldelry female, cachetic appearance  Severe muscle wasting in upper, lower ext and facial muscles    Awake, alert and disoriented.  Appears to be not in any distress  Mucous Membranes:  Pink , anicteric  Neck: No JVD, no carotid bruit, no thyromegaly  Chest:  Clear to auscultation bilaterally, no added sounds  Cardiovascular:  RRR S1S2 heard, no murmurs or gallops  Abdomen/:  Soft, undistended, non tender, no organomegaly, BS present, + significant interval change. XR CHEST PORTABLE   Final Result   Stable exam.         XR CHEST PORTABLE   Final Result   Supportive tubing projects in normal position. Persistent pattern of pulmonary edema. Pneumonia remains a differential   possibility. Small right pleural effusion. IR PICC WO SQ PORT/PUMP > 5 YEARS   Final Result      XR CHEST PORTABLE   Final Result   Stable lines and catheters. Persistent multifocal interstitial opacities most pronounced both lung bases. Slight improved aeration of the upper and mid lungs. XR CHEST PORTABLE   Final Result   Worsening airspace opacities especially in the bases. XR CHEST PORTABLE   Final Result   1. Stable appropriate positions of support apparatus. 2. Stable multifocal pneumonia. CT HEAD WO CONTRAST   Final Result   No acute intracranial finding. MRI may be obtained if clinically indicated. XR CHEST PORTABLE   Final Result   Patient is intubated with tubes seen as above. Hyperinflated lungs. No   overt consolidation. Fracture deformity of the left proximal humerus is   incompletely imaged. Recommend comparison with clinical exam and follow-up   with dedicated views. XR CHEST PORTABLE   Final Result   Technically limited study performed portably with arms overlying the lower   chest.  Asymmetric ground-glass opacification appears present in the right   lower lung zone which may represent edema or developing pneumonitis. CULTURES  Resp Cx: pending   Resp Cx (expectorated): NRF  Urine Cx: NGTD   SARS-CoV-2, NAAT DETECTED       Assessment/Plan:    Acute hypoxic and hypercarbic respiratory failure - Resolved  - 2/2 COVID 19  - S/P mechanical ventilation.  - Intubated in ER on 12/1-> Extubated 12/10.  - Transferred out of  ICU on 1.5 L of oxygen  - Acute decompensation with worsening oxygenation 12/12 & Placed on BiPAP initially.  Later transferred back to the ICU and intubated and started on mechanical ventilation  - [id number]/12, Extubated 12/14.  - On FiO2 30% per Vapotherm. --> now on RA     COVID 19 Pneumonia  - Pulmonary consulted  - Completed 10 days of Decadron - being tapered  - s/p remdesivir D5 and s/p CCP on 12/1  - completed Rocephin and Zithromax 5/5  - imaging with stable ASD   - Droplet plus isolation     Sepsis - resolved  - tachycardia, tachypnea, leucocytosis  - 2/2 COVID-19 pneumonia  - Antibiotics as above  - Follow-up cultures - negative     Brief Asystole with CPR requirement on 12/3 during nursing care  - no meds given, ROSC with in few sec  - stable since      Acute metabolic encephalopathy - improved  - Suspect related to hypercarbia, sepsis, acute infection  - has a nonfocal neurologic exam, moving all extremities. does have a deformed left upper extremity secondary to a distal humerus fracture which was never repaired.   - Management of sepsis, respiratory failure and infection as above  - Checked CT head - neg    - Checked TSH (normal),  Low folic acid - replace  - Checked ethanol level- neg  - Mental status improved- still with intermittent confusion noted, add seroquel at night  - mentation stable this am.     Hypokalemia  - 3, mild  - replete    Hypomagnesemia  - 1.7  - mild, replete and repeat Mg tomorrow    Acute blood loss anemia, on chronic anemia  History of peptic ulcer disease  - Gastric ulcer noted on EGD in 2013  - Start IV PPI bid , h.h dropped from 8.7 to 6.5, partly with IV fluid boluses   - s.p 1 unit pRBC   - iron studies with folate and iron def, started supplements   - Hg 6.9  - transfused 2  unit of red cells   - Hb 12.3-->13.3--11.7-->11.5 > 11.8 > 11.6 > 10.3    TCP  - Plt slowly trending down  - 123 > 110 > 92 today  - monitor with lovenox use     Left distal humerus fracture 2018  - Chronic deformity of the left upper extremity     Reported history of alcohol abuse  - Unclear if she is still consuming alcohol. - Checked an ethanol level- negative    Hypoglycemia - improved   - on D10 fluids, should consider PEG tube for feeding - but pt refused   - no recurrence- stop IVF in am     Severe PCM  Failure to thrive   - extreme muscle wasting.    - dietitian yoel, Continue tube feeds  - checked TSH wnl  - Patient's wt has increased with continued tube feeds since admission--> no longer on TF with no OG tube   - SLP evaluation with recommendations for continued NPO status and re-evaluation on 12/16  - daily weights   -initial diet -  NPO with pleasure feeds  - family agreeable to PEG tube-->patient denies at this time  - seen by speech on 12/19 - rec upgrade to Minced and Moist with thin liquids, no straws and meds crushed in puree    Palliative Care Consulted  - son would like code status changed to DNR CC  - would like to proceed with STR with palliative care  - family would like [facility], however d/t + COVID status they are unable to accept unless greater than 21 days from first detection     DVT Prophylaxis: Lovenox qd- given low weight   Diet: Dietary Nutrition Supplements: Standard High Calorie Oral Supplement  DIET DYSPHAGIA MINCED AND MOIST;  Dysphagia Minced and Moist; No Drinking Straw  Code Status: Full Code -->family reports wanting DNR CC but patient expressed wanting Full code     Dc planning to SNF, pt needs to be out of restraints, plan for monday    Jolie Yoon 12/19/2020 5:39 PM

## 2020-12-19 NOTE — PROGRESS NOTES
Occupational Therapy Daily Treatment Note    Unit: Surge overflow   Date:  12/19/2020  Patient Name:    Stefanie Martinez  Admitting diagnosis:  Failure to thrive in adult [R62.7]  Acute respiratory failure with hypoxia ([address] [J96.01]  Admit Date:  12/1/2020  Precautions/Restrictions:  fall risk, IV, sandoval catheter , telemetry, continuous pulse ox and Droplet Plus precautions (+ COVID 19)        Discharge Recommendations: SNF  DME needs for discharge: defer to facility       Therapy recommendations for staff:   Assist of 2 for bed mobility     AM-PAC Score: AM-PAC Inpatient Daily Activity Raw Score: 10  Home Health S4 Level: NA       Treatment Time:  2766-4302  Treatment number:  2  Total Treatment Time:   25 minutes    History of Present Illness: per H&P70 y. o. female with reported history of dementia, reported history of alcohol abuse, prior admission to hospital for FTT (2013), gastric ulcer (2013) who presented to [facility] with complaint of \"failure to thrive\".  Patient is awake on my exam, she will tell me her name but she cannot tell me where she is or the month of the year.  She cannot provide any history due to her mentation.  ER provider spoke with the patient's son who stated she stopped eating three for 4 days ago and would only take little sips of water.  Family sent her to the ER for further evaluation due to this.     In the ER she was afebrile, borderline low blood pressure, hypoxic on room air requiring 4 L of nasal cannula oxygen.  She does not use home oxygen.  Her rapid Covid test is positive.  Chest x-ray showed asymmetric groundglass opacifications in the right lower lung.  Initial potassium was 6.2 (?hemolyzed), her nurse tells me she received partial doses of insulin and calcium gluconate as well as IV fluids, repeat potassium level 3.8.     When I saw her in the emergency department she was lethargic, continually removing her oxygen with subsequent desaturations to the 70s.  When wearing 4 L nasal toilet/BSC: Max A     To be met in 5 Visits:  1). Supine to/from Sit:             Max A  2). Upper Body Bathing:         Max A  3). Lower Body Bathing:         Max A  4). Upper Body Dressing:       Max A  5). Lower Body Dressing:       Max A  6).  Pt to demonstrate UE exs x 15 reps with minimal cues      Plan: cont with POC      Larisa Pedroza, MOT, OTR/L   [id number]       If patient discharges from this facility prior to next visit, this note will serve as the Discharge Summary

## 2020-12-19 NOTE — FLOWSHEET NOTE
12/18/20 2015   Vital Signs   Temp 97.9 °F (36.6 °C)   Temp Source Axillary   Pulse 94   Heart Rate Source Monitor   Resp 18   BP (!) 138/102   BP Location Left lower arm   Level of Consciousness Alert (0)   MEWS Score 1   Oxygen Therapy   SpO2 100 %   Pulse Oximeter Device Mode Continuous   Pulse Oximeter Device Location Finger   O2 Device None (Room air)   Pt assessment complete. Pt lying in bed quietly. Lung sounds diminished. Pt on room air with SP02 at 100%. Pt remains in bilateral wrist restrains due to dementia and pulling on lines. IV flluids D10 Normal saline infusing at 60ml/hr. Bernard cath in place draining clear yellow urine. Pt repositioned for comfort. Nightly medications given. Will continue to monitor.

## 2020-12-19 NOTE — PROGRESS NOTES
Please call patient - on the AVS from yesterday it states a Z-Luisito was called in, but thinking it was to be for the antibiotics prior to dental work.  Please call to address. [phone]       Weight: 110 lbs; % Ideal Body Weight 53.3 %   · BMI: 11.4  · Adjusted Body Weight:  ; No Adjustment   · BMI Categories: Underweight (BMI less than 22) age over 72       Nutrition Diagnosis:   · Severe malnutrition related to inadequate protein-energy intake, impaired respiratory function, cognitive or neurological impairment as evidenced by intake 0-25%, poor intake prior to admission, severe loss of subcutaneous fat, severe muscle loss, swallow study results, BMI      Nutrition Interventions:   Food and/or Nutrient Delivery:  Continue Current Diet, Start Oral Nutrition Supplement  Nutrition Education/Counseling:  No recommendation at this time   Coordination of Nutrition Care:  Continue to monitor while inpatient    Goals:  pt will continue to accept and consume the pureed diet > 50% and lili accept 1 ensure enlive daily weight lili continue to increase       Nutrition Monitoring and Evaluation:   Behavioral-Environmental Outcomes:  None Identified   Food/Nutrient Intake Outcomes:  Diet Advancement/Tolerance, Food and Nutrient Intake, Supplement Intake  Physical Signs/Symptoms Outcomes:  Biochemical Data, Chewing or Swallowing, Weight, Hemodynamic Status     Discharge Planning:     Too soon to determine     Electronically signed by Winfield Gosselin, RD, LD on 12/18/20 at 7:42 PM EST    Contact: [phone]

## 2020-12-19 NOTE — PROGRESS NOTES
Hospitalist Progress Note      PCP: No primary care provider on file. Date of Admission: 12/1/2020     Admitted with Covid pneumonia, acute respiratory failure and altered mental status    S/p intubation and mechanical ventilation 12/1-> 12/10  reintubated on 12/12/2020 and extubated today 1214    Subjective:   Merced Motta seen slightly confused and had severe sinus tach and confusion overnight requiring restraints      Medications:  Reviewed    Infusion Medications    IV infusion builder 60 mL/hr at 12/18/20 0904    norepinephrine      dextrose       Scheduled Medications    [START ON 12/19/2020] dexamethasone  3 mg Intravenous Daily    enoxaparin  30 mg Subcutaneous Daily    pantoprazole  40 mg Intravenous Daily    sodium chloride flush  10 mL Intravenous 2 times per day    folic acid  1 mg Oral Daily     PRN Meds: midazolam, norepinephrine, iopamidol, ipratropium-albuterol, sodium chloride, sodium chloride flush, acetaminophen **OR** acetaminophen, polyethylene glycol, promethazine **OR** ondansetron, glucose, dextrose, glucagon (rDNA), dextrose, potassium chloride **OR** potassium alternative oral replacement **OR** potassium chloride, magnesium sulfate      Intake/Output Summary (Last 24 hours) at 12/18/2020 1918  Last data filed at 12/18/2020 1826  Gross per 24 hour   Intake 1262 ml   Output 2100 ml   Net -838 ml       Physical Exam Performed:    BP (!) 153/91   Pulse 92   Temp 97.9 °F (36.6 °C) (Temporal)   Resp 18   Ht 5' 2\" (1.575 m)   Wt 62 lb 6.4 oz (28.3 kg)   SpO2 99%   BMI 11.41 kg/m²   General: thin eldelry female, cachetic appearance  Severe muscle wasting in upper, lower ext and facial muscles    Awake, alert and disoriented.  Appears to be not in any distress  Mucous Membranes:  Pink , anicteric  Neck: No JVD, no carotid bruit, no thyromegaly  Chest:  Clear to auscultation bilaterally, no added sounds  Cardiovascular:  RRR S1S2 heard, no murmurs or gallops  Abdomen/:  Soft, undistended, non tender, no organomegaly, BS present, + sandoval in place with yellow urine  Extremities: atrophic ext, deformity of left shoulder with previous humerus fracture, + edema of LUE  No edema or cyanosis. Distal pulses well felt  Neurological : slightly confused , non focal  with severe gen weakness                 Labs:   Recent Labs     12/16/20  0530 12/17/20  0613 12/18/20  0518   WBC 5.6 5.4 5.2   HGB 11.8* 11.6* 11.7*   HCT 37.1 35.9* 35.9*    123* 110*     Recent Labs     12/16/20  0530 12/17/20  0613 12/18/20  0518   * 134* 138   K 3.4* 3.3* 3.8   CL 92* 98* 101   CO2 24 30 30   BUN 11 15 8   CREATININE <0.5* <0.5* <0.5*   CALCIUM 8.9 8.9 8.6     Radiology:  VL Extremity Venous Left   Final Result      XR CHEST PORTABLE   Final Result   Relatively stable appearance of the chest.  Lucent, hyperinflated lungs   suggest COPD. Blunting versus trace right pleural effusion at the right   costophrenic sulcus. XR CHEST PORTABLE   Final Result   Possible small right pleural effusion. CT CHEST PULMONARY EMBOLISM W CONTRAST   Final Result   1. Small bilateral pleural effusions, right greater than left. XR CHEST PORTABLE   Final Result   1. Endotracheal tube in satisfactory position above the ryley   2. NG tip in gastric fundus and side port near GE junction   3. Otherwise, stable chest         XR CHEST PORTABLE   Final Result   Stable chest demonstrating bilateral pleural effusions. XR CHEST PORTABLE   Final Result   No significant change in appearance of the chest over the past 24 hours with   persistent diffuse bilateral pulmonary disease and trace pleural fluid.    There also is diffuse bowel dilation suggesting ileus         XR CHEST PORTABLE   Final Result   Essentially stable exam with probable pulmonary emphysema, and suspected   small pleural effusions with bibasilar atelectasis         XR CHEST PORTABLE   Final Result   Persistent small bilateral pleural effusions. XR CHEST PORTABLE   Final Result   No significant interval change. XR CHEST PORTABLE   Final Result   Stable exam.         XR CHEST PORTABLE   Final Result   Supportive tubing projects in normal position. Persistent pattern of pulmonary edema. Pneumonia remains a differential   possibility. Small right pleural effusion. IR PICC WO SQ PORT/PUMP > 5 YEARS   Final Result      XR CHEST PORTABLE   Final Result   Stable lines and catheters. Persistent multifocal interstitial opacities most pronounced both lung bases. Slight improved aeration of the upper and mid lungs. XR CHEST PORTABLE   Final Result   Worsening airspace opacities especially in the bases. XR CHEST PORTABLE   Final Result   1. Stable appropriate positions of support apparatus. 2. Stable multifocal pneumonia. CT HEAD WO CONTRAST   Final Result   No acute intracranial finding. MRI may be obtained if clinically indicated. XR CHEST PORTABLE   Final Result   Patient is intubated with tubes seen as above. Hyperinflated lungs. No   overt consolidation. Fracture deformity of the left proximal humerus is   incompletely imaged. Recommend comparison with clinical exam and follow-up   with dedicated views. XR CHEST PORTABLE   Final Result   Technically limited study performed portably with arms overlying the lower   chest.  Asymmetric ground-glass opacification appears present in the right   lower lung zone which may represent edema or developing pneumonitis.            CULTURES  Resp Cx: pending   Resp Cx (expectorated): NRF  Urine Cx: NGTD   SARS-CoV-2, NAAT DETECTED       Assessment/Plan:  Acute hypoxic and hypercarbic respiratory failure - Resolved  - 2/2 COVID 19  - S/P mechanical ventilation.  - Intubated in ER on 12/1-> Extubated 12/10.  - Transferred out of  ICU on 1.5 L of oxygen  - Acute decompensation with worsening oxygenation 12/12 & Placed on BiPAP initially. Later transferred back to the ICU and intubated and started on mechanical ventilation  - [id number], Extubated 12/14.  - On FiO2 30% per Vapotherm. --> now on RA     COVID 19 Pneumonia  - Pulmonary consulted  - Completed 10 days of Decadron - being tapered  - s/p remdesivir D5 and s/p CCP on 12/1  - completed Rocephin and Zithromax 5/5  - imaging with stable ASD   - Droplet plus isolation       Sepsis - resolved  - tachycardia, tachypnea, leucocytosis  - 2/2 COVID-19 pneumonia  - Antibiotics as above  - Follow-up cultures - negative     Brief Asystole with CPR requirement on 12/3 during nursing care  - no meds given, ROSC with in few sec  - stable since      Acute metabolic encephalopathy - Resolved  - Suspect related to hypercarbia, sepsis, acute infection  - has a nonfocal neurologic exam, moving all extremities. does have a deformed left upper extremity secondary to a distal humerus fracture which was never repaired.   - Management of sepsis, respiratory failure and infection as above  - Checked CT head - neg    - Checked TSH (normal),  Low folic acid - replace  - Checked ethanol level- neg  - Mental status improved- still with intermittent confusion noted, add seroquel at night    Hypokalemia  - 3.3, mild  - repleted, sliding scale replacement        Acute blood loss anemia, on chronic anemia  History of peptic ulcer disease  - Gastric ulcer noted on EGD in 2013  - Start IV PPI bid , h.h dropped from 8.7 to 6.5, partly with IV fluid boluses   - s.p 1 unit pRBC   - iron studies with folate and iron def, started supplements   - Hg 6.9  - transfused 2  unit of red cells   - Hb 12.3-->13.3--11.7-->11.5 > 11.8 > 11.6    TCP  - Plt slowly trending down  - 123 today  - monitor with lovenox use     Left distal humerus fracture 2018  - Chronic deformity of the left upper extremity     Reported history of alcohol abuse  - Unclear if she is still consuming alcohol. - Checked an ethanol level- negative    Hypoglycemia - improved   - on D10 fluids, should consider PEG tube for feeding - but pt refused   - no recurrence- stop IVF in am     Severe PCM  Failure to thrive   - extreme muscle wasting.    - dietitian eval, Continue tube feeds  - checked TSH wnl  - Patient's wt has increased with continued tube feeds since admission--> no longer on TF with no OG tube   - SLP evaluation with recommendations for continued NPO status and re-evaluation on 12/16  - daily weights   -Solid Consistency: NPO w/ pleasure feeds of IDDSI 4 puree  Liquid Consistency: NPO w/pleasure feeds of IDDSI 0 thin  Medication: Crushed in puree as possible  - family agreeable to PEG tube-->patient denies at this time    Palliative Care Consulted  - son would like code status changed to DNR CC  - would like to proceed with STR with palliative care  - family would like [facility], however d/t + COVID status they are unable to accept unless greater than 21 days from first detection     DVT Prophylaxis: Lovenox qd- given low weight   Diet: DIET DYSPHAGIA PUREED; Dysphagia Pureed;  No Drinking Straw  Code Status: Full Code -->family reports wanting DNR CC but patient expressed wanting Full code     Dc planning to SNF if stable overnight and off restraints    Mabel Felty, MD 12/18/2020 7:18 PM

## 2020-12-19 NOTE — PROGRESS NOTES
Bedside report and Pt care transferred to [facility]. Pt denies any assistance at this time.

## 2020-12-19 NOTE — PLAN OF CARE
Nutrition Problem #1: Severe malnutrition  Intervention: Food and/or Nutrient Delivery: Continue Current Diet, Start Oral Nutrition Supplement  Nutritional Goals: pt will continue to accept and consume the pureed diet > 50% and will accept 1 ensure enlive daily; body weight will continue to increase

## 2020-12-20 LAB
ANION GAP SERPL CALCULATED.3IONS-SCNC: 3 MMOL/L (ref 3–16)
BASOPHILS ABSOLUTE: 0 K/UL (ref 0–0.2)
BASOPHILS RELATIVE PERCENT: 1.6 %
BUN BLDV-MCNC: 11 MG/DL (ref 7–20)
CALCIUM SERPL-MCNC: 7.9 MG/DL (ref 8.3–10.6)
CHLORIDE BLD-SCNC: 101 MMOL/L (ref 99–110)
CO2: 30 MMOL/L (ref 21–32)
CREAT SERPL-MCNC: <0.5 MG/DL (ref 0.6–1.2)
EOSINOPHILS ABSOLUTE: 0.1 K/UL (ref 0–0.6)
EOSINOPHILS RELATIVE PERCENT: 3.4 %
GFR AFRICAN AMERICAN: >60
GFR NON-AFRICAN AMERICAN: >60
GLUCOSE BLD-MCNC: 164 MG/DL (ref 70–99)
GLUCOSE BLD-MCNC: 92 MG/DL (ref 70–99)
GLUCOSE BLD-MCNC: 99 MG/DL (ref 70–99)
HCT VFR BLD CALC: 31.9 % (ref 36–48)
HEMOGLOBIN: 10.2 G/DL (ref 12–16)
LYMPHOCYTES ABSOLUTE: 0.6 K/UL (ref 1–5.1)
LYMPHOCYTES RELATIVE PERCENT: 24.9 %
MAGNESIUM: 1.9 MG/DL (ref 1.8–2.4)
MCH RBC QN AUTO: 25.5 PG (ref 26–34)
MCHC RBC AUTO-ENTMCNC: 32 G/DL (ref 31–36)
MCV RBC AUTO: 79.7 FL (ref 80–100)
MONOCYTES ABSOLUTE: 0.2 K/UL (ref 0–1.3)
MONOCYTES RELATIVE PERCENT: 7.1 %
NEUTROPHILS ABSOLUTE: 1.6 K/UL (ref 1.7–7.7)
NEUTROPHILS RELATIVE PERCENT: 63 %
PDW BLD-RTO: 30.9 % (ref 12.4–15.4)
PERFORMED ON: NORMAL
PERFORMED ON: NORMAL
PLATELET # BLD: 99 K/UL (ref 135–450)
PMV BLD AUTO: 8.8 FL (ref 5–10.5)
POTASSIUM SERPL-SCNC: 3.7 MMOL/L (ref 3.5–5.1)
RBC # BLD: 4 M/UL (ref 4–5.2)
SODIUM BLD-SCNC: 134 MMOL/L (ref 136–145)
WBC # BLD: 2.6 K/UL (ref 4–11)

## 2020-12-20 PROCEDURE — C9113 INJ PANTOPRAZOLE SODIUM, VIA: HCPCS | Performed by: INTERNAL MEDICINE

## 2020-12-20 PROCEDURE — 1200000000 HC SEMI PRIVATE

## 2020-12-20 PROCEDURE — 2580000003 HC RX 258: Performed by: INTERNAL MEDICINE

## 2020-12-20 PROCEDURE — 80048 BASIC METABOLIC PNL TOTAL CA: CPT

## 2020-12-20 PROCEDURE — 99232 SBSQ HOSP IP/OBS MODERATE 35: CPT | Performed by: INTERNAL MEDICINE

## 2020-12-20 PROCEDURE — 85025 COMPLETE CBC W/AUTO DIFF WBC: CPT

## 2020-12-20 PROCEDURE — 6360000002 HC RX W HCPCS: Performed by: INTERNAL MEDICINE

## 2020-12-20 PROCEDURE — 6370000000 HC RX 637 (ALT 250 FOR IP): Performed by: INTERNAL MEDICINE

## 2020-12-20 PROCEDURE — 83735 ASSAY OF MAGNESIUM: CPT

## 2020-12-20 PROCEDURE — 82947 ASSAY GLUCOSE BLOOD QUANT: CPT

## 2020-12-20 PROCEDURE — 36415 COLL VENOUS BLD VENIPUNCTURE: CPT

## 2020-12-20 RX ORDER — LORAZEPAM 0.5 MG/1
0.5 TABLET ORAL EVERY 4 HOURS PRN
Status: DISCONTINUED | OUTPATIENT
Start: 2020-12-20 | End: 2020-12-23 | Stop reason: HOSPADM

## 2020-12-20 RX ADMIN — LORAZEPAM 0.5 MG: 0.5 TABLET ORAL at 14:05

## 2020-12-20 RX ADMIN — Medication 10 ML: at 08:28

## 2020-12-20 RX ADMIN — DEXAMETHASONE SODIUM PHOSPHATE 3 MG: 4 INJECTION, SOLUTION INTRAMUSCULAR; INTRAVENOUS at 08:27

## 2020-12-20 RX ADMIN — ENOXAPARIN SODIUM 30 MG: 30 INJECTION SUBCUTANEOUS at 08:29

## 2020-12-20 RX ADMIN — ONDANSETRON HYDROCHLORIDE 4 MG: 2 INJECTION, SOLUTION INTRAMUSCULAR; INTRAVENOUS at 08:28

## 2020-12-20 RX ADMIN — Medication 10 ML: at 20:24

## 2020-12-20 RX ADMIN — PANTOPRAZOLE SODIUM 40 MG: 40 INJECTION, POWDER, FOR SOLUTION INTRAVENOUS at 08:28

## 2020-12-20 NOTE — PROGRESS NOTES
AM assessment completed. Patient is requesting to smoke. Educated that this is a non smoking facility and she does not currently have cigarettes. She became slightly agitated and refused medications. After providing 1:1 she was redirected and then allowed staff to provide care and took all IV and subq medications. She refused folic acid. C/O nausea, PRN zofran given, she still continues to eat and drink on own after set up from staff. Skin dry and flakey, tenting. Bernard in place with cloudy yellow urine noted. Foam dressings CD&I. IV fluids discontinued per Dr. Alberto Gabriel notes. Call light and bedside table within reach. Will continue to monitor.

## 2020-12-20 NOTE — FLOWSHEET NOTE
12/19/20 1931   Vital Signs   Temp 97.8 °F (36.6 °C)   Temp Source Oral   Pulse 86   Heart Rate Source Monitor   Resp 18   /82   BP Location Left lower arm   Level of Consciousness Alert (0)   MEWS Score 1   Oxygen Therapy   SpO2 94 %   O2 Device None (Room air)   Pt assessment complete. Pt lying in bed quietly. Pt alert and oriented at this time. Lung sounds diminished. Pt on Room air. SP02 94%. IV fluids D10 Normal saline infusing at 60ml/hr. Bernard cath in place draining clear yellow urine. Mepilex to coccyx dry and intact. Pt repositioned for comfort. Warm blanket provided per patient request.  No other needs at this time. Call light within reach.

## 2020-12-20 NOTE — PROGRESS NOTES
Hospitalist Progress Note      PCP: No primary care provider on file. Date of Admission: 12/1/2020     Admitted with Covid pneumonia, acute respiratory failure and altered mental status    S/p intubation and mechanical ventilation 12/1-> 12/10  reintubated on 12/12/2020 and extubated today 1214    Subjective:   55 Zuri Anderson is doing better. DCP working on discharge. Afebrile, on RA      Medications:  Reviewed    Infusion Medications    norepinephrine      dextrose       Scheduled Medications    dexamethasone  3 mg Intravenous Daily    enoxaparin  30 mg Subcutaneous Daily    pantoprazole  40 mg Intravenous Daily    sodium chloride flush  10 mL Intravenous 2 times per day    folic acid  1 mg Oral Daily     PRN Meds: midazolam, norepinephrine, iopamidol, ipratropium-albuterol, sodium chloride, sodium chloride flush, acetaminophen **OR** acetaminophen, polyethylene glycol, promethazine **OR** ondansetron, glucose, dextrose, glucagon (rDNA), dextrose, potassium chloride **OR** potassium alternative oral replacement **OR** potassium chloride, magnesium sulfate      Intake/Output Summary (Last 24 hours) at 12/20/2020 1357  Last data filed at 12/20/2020 1232  Gross per 24 hour   Intake 910 ml   Output 1700 ml   Net -790 ml       Physical Exam Performed:    /71   Pulse 122   Temp 98.4 °F (36.9 °C) (Temporal)   Resp 20   Ht 5' 2\" (1.575 m)   Wt 62 lb 4.8 oz (28.3 kg)   SpO2 91%   BMI 11.39 kg/m²      General: thin eldelry female, cachetic appearance  Severe muscle wasting in upper, lower ext and facial muscles    Awake, alert and disoriented.  Appears to be not in any distress  Mucous Membranes:  Pink , anicteric  Neck: No JVD, no carotid bruit, no thyromegaly  Chest:  Clear to auscultation bilaterally, no added sounds  Cardiovascular:  RRR S1S2 heard, no murmurs or gallops  Abdomen/:  Soft, undistended, non tender, no organomegaly, BS present, + sandoval in place with yellow urine  Extremities: atrophic ext, deformity of left shoulder with previous humerus fracture, + edema of LUE  No edema or cyanosis. Distal pulses well felt  Neurological : slightly confused , non focal  with severe gen weakness     Labs:   Recent Labs     12/18/20 0518 12/19/20 0513 12/20/20 0520   WBC 5.2 2.4* 2.6*   HGB 11.7* 10.3* 10.2*   HCT 35.9* 31.7* 31.9*   * 92* 99*     Recent Labs     12/18/20 0518 12/19/20 0513 12/20/20 0520    134* 134*   K 3.8 3.0* 3.7    98* 101   CO2 30 30 30   BUN 8 13 11   CREATININE <0.5* <0.5* <0.5*   CALCIUM 8.6 8.4 7.9*     Radiology:  VL Extremity Venous Left   Final Result      XR CHEST PORTABLE   Final Result   Relatively stable appearance of the chest.  Lucent, hyperinflated lungs   suggest COPD. Blunting versus trace right pleural effusion at the right   costophrenic sulcus. XR CHEST PORTABLE   Final Result   Possible small right pleural effusion. CT CHEST PULMONARY EMBOLISM W CONTRAST   Final Result   1. Small bilateral pleural effusions, right greater than left. XR CHEST PORTABLE   Final Result   1. Endotracheal tube in satisfactory position above the ryley   2. NG tip in gastric fundus and side port near GE junction   3. Otherwise, stable chest         XR CHEST PORTABLE   Final Result   Stable chest demonstrating bilateral pleural effusions. XR CHEST PORTABLE   Final Result   No significant change in appearance of the chest over the past 24 hours with   persistent diffuse bilateral pulmonary disease and trace pleural fluid. There also is diffuse bowel dilation suggesting ileus         XR CHEST PORTABLE   Final Result   Essentially stable exam with probable pulmonary emphysema, and suspected   small pleural effusions with bibasilar atelectasis         XR CHEST PORTABLE   Final Result   Persistent small bilateral pleural effusions. XR CHEST PORTABLE   Final Result   No significant interval change.          XR CHEST PORTABLE Final Result   Stable exam.         XR CHEST PORTABLE   Final Result   Supportive tubing projects in normal position. Persistent pattern of pulmonary edema. Pneumonia remains a differential   possibility. Small right pleural effusion. IR PICC WO SQ PORT/PUMP > 5 YEARS   Final Result      XR CHEST PORTABLE   Final Result   Stable lines and catheters. Persistent multifocal interstitial opacities most pronounced both lung bases. Slight improved aeration of the upper and mid lungs. XR CHEST PORTABLE   Final Result   Worsening airspace opacities especially in the bases. XR CHEST PORTABLE   Final Result   1. Stable appropriate positions of support apparatus. 2. Stable multifocal pneumonia. CT HEAD WO CONTRAST   Final Result   No acute intracranial finding. MRI may be obtained if clinically indicated. XR CHEST PORTABLE   Final Result   Patient is intubated with tubes seen as above. Hyperinflated lungs. No   overt consolidation. Fracture deformity of the left proximal humerus is   incompletely imaged. Recommend comparison with clinical exam and follow-up   with dedicated views. XR CHEST PORTABLE   Final Result   Technically limited study performed portably with arms overlying the lower   chest.  Asymmetric ground-glass opacification appears present in the right   lower lung zone which may represent edema or developing pneumonitis. CULTURES  Resp Cx: pending   Resp Cx (expectorated): NRF  Urine Cx: NGTD   SARS-CoV-2, NAAT DETECTED       Assessment/Plan:    Acute hypoxic and hypercarbic respiratory failure - Resolved  - 2/2 COVID 19  - S/P mechanical ventilation.  - Intubated in ER on 12/1-> Extubated 12/10.  - Transferred out of  ICU on 1.5 L of oxygen  - Acute decompensation with worsening oxygenation 12/12 & Placed on BiPAP initially.  Later transferred back to the ICU and intubated and started on mechanical ventilation  - [id number], Extubated 12/14.  - On FiO2 30% per Vapotherm. --> now on RA     COVID 19 Pneumonia  - Pulmonary consulted  - Completed 10 days of Decadron - being tapered  - s/p remdesivir D5 and s/p CCP on 12/1  - completed Rocephin and Zithromax 5/5  - imaging with stable ASD   - Droplet plus isolation     Sepsis - resolved  - tachycardia, tachypnea, leucocytosis  - 2/2 COVID-19 pneumonia  - Antibiotics as above  - Follow-up cultures - negative     Brief Asystole with CPR requirement on 12/3 during nursing care  - no meds given, ROSC with in few sec  - stable since      Acute metabolic encephalopathy - improved  - Suspect related to hypercarbia, sepsis, acute infection  - has a nonfocal neurologic exam, moving all extremities. does have a deformed left upper extremity secondary to a distal humerus fracture which was never repaired.   - Management of sepsis, respiratory failure and infection as above  - Checked CT head - neg    - Checked TSH (normal),  Low folic acid - replace  - Checked ethanol level- neg  - Mental status improved- still with intermittent confusion noted, add seroquel at night  - mentation stable this am.     Hypokalemia  - 3, mild  - replete    Hypomagnesemia  - 1.7  - mild, replete and repeat Mg tomorrow    Acute blood loss anemia, on chronic anemia  History of peptic ulcer disease  - Gastric ulcer noted on EGD in 2013  - Start IV PPI bid , h.h dropped from 8.7 to 6.5, partly with IV fluid boluses   - s.p 1 unit pRBC   - iron studies with folate and iron def, started supplements   - Hg 6.9  - transfused 2  unit of red cells   - Hb 12.3-->13.3--11.7-->11.5 > 11.8 > 11.6 > 10.3    TCP  - Plt slowly trending down  - 123 > 110 > 92 today  - monitor with lovenox use     Left distal humerus fracture 2018  - Chronic deformity of the left upper extremity     Reported history of alcohol abuse  - Unclear if she is still consuming alcohol. - Checked an ethanol level- negative    Hypoglycemia - improved   - on D10 fluids, should consider PEG tube for feeding - but pt refused   - no recurrence- stop IVF in am     Severe PCM  Failure to thrive   - extreme muscle wasting.    - dietitian eval, Continue tube feeds  - checked TSH wnl  - Patient's wt has increased with continued tube feeds since admission--> no longer on TF with no OG tube   - SLP evaluation with recommendations for continued NPO status and re-evaluation on 12/16  - daily weights   -initial diet -  NPO with pleasure feeds  - family agreeable to PEG tube-->patient denies at this time  - seen by speech on 12/19 - rec upgrade to Minced and Moist with thin liquids, no straws and meds crushed in puree    Palliative Care Consulted  - son would like code status changed to DNR CC  - would like to proceed with STR with palliative care  - family would like [facility], however d/t + COVID status they are unable to accept unless greater than 21 days from first detection     DVT Prophylaxis: Lovenox qd- given low weight   Diet: Dietary Nutrition Supplements: Standard High Calorie Oral Supplement  DIET DYSPHAGIA MINCED AND MOIST;  Dysphagia Minced and Moist; No Drinking Straw  Code Status: Full Code -->family reports wanting DNR CC but patient expressed wanting Full code     Dc planning to SNF, pt needs to be out of restraints, plan for monday    Nevaeh Chavira 12/20/2020 1:57 PM

## 2020-12-20 NOTE — PROGRESS NOTES
Pt is lying in bed with their eyes closed. Respirations are easy and even. Call light within reach bed in lowest position with the wheels locked. Will continue to monitor.  Adalberto Dela Cruz

## 2020-12-20 NOTE — PROGRESS NOTES
Pt becoming more agitated and restless, she wants to get out of bed. High fall risk, pt HR increased to 120's. Notified Dr. Denver Schooner via perfect serve to request medication to help pt anxiety. Pt high fall risk and multiple attempts to leave bed, despite interventions.

## 2020-12-20 NOTE — PROGRESS NOTES
Pulmonary Progress Note      CC: COVID-19, hypoxemia, AMS    Subjective:    Appears comfortable  Room air      Invasive Lines:   12/4/2020 PICC    MV: 12/1/2020-12/10/2020; reintubated on 12/12/2020 for severe hypoxemia/presumably secondary to secretions  Vent Mode: AC/VC Rate Set: 20 bmp/Vt Ordered: 290 mL/ /FiO2 : 25 %  No results for input(s): PHART, [id number], PO2ART in the last 72 hours. IV:   IV infusion builder 60 mL/hr at 12/19/20 0543    norepinephrine      dextrose         EXAM:  /74   Pulse 67   Temp 97.5 °F (36.4 °C) (Temporal)   Resp 20   Ht 5' 2\" (1.575 m)   Wt 62 lb 4.8 oz (28.3 kg)   SpO2 92%   BMI 11.39 kg/m²  Room air    Intake/Output Summary (Last 24 hours) at 12/20/2020 0740  Last data filed at 12/20/2020 9840  Gross per 24 hour   Intake 790 ml   Output 2000 ml   Net -1210 ml     EXAM    Constitutional:  No acute distress. Clinton Founds HEENT: no scleral icterus  Neck: No tracheal deviation present. Cardiovascular: Normal heart sounds. Pulmonary/Chest: No wheezes. No rhonchi. Few rales. No decreased breath sounds. No accessory muscle usage or stridor. Abdominal: Soft. Musculoskeletal: No cyanosis. No clubbing. Skin: Skin is warm and dry.        Medications:   dexamethasone  3 mg Intravenous Daily    enoxaparin  30 mg Subcutaneous Daily    pantoprazole  40 mg Intravenous Daily    sodium chloride flush  10 mL Intravenous 2 times per day    folic acid  1 mg Oral Daily     PRN Meds:  midazolam, norepinephrine, iopamidol, ipratropium-albuterol, sodium chloride, sodium chloride flush, acetaminophen **OR** acetaminophen, polyethylene glycol, promethazine **OR** ondansetron, glucose, dextrose, glucagon (rDNA), dextrose, potassium chloride **OR** potassium alternative oral replacement **OR** potassium chloride, magnesium sulfate    Results:  CBC:   Recent Labs     12/18/20  0518 12/19/20  0513 12/20/20  0520   WBC 5.2 2.4* 2.6*   HGB 11.7* 10.3* 10.2*   HCT 35.9* 31.7* 31.9*   MCV 79.5* 79.7* 79.7*   * 92* 99*     BMP:   Recent Labs     12/18/20  0518 12/19/20  0513 12/20/20  0520    134* 134*   K 3.8 3.0* 3.7    98* 101   CO2 30 30 30   BUN 8 13 11   CREATININE <0.5* <0.5* <0.5*     LIVER PROFILE:   No results for input(s): AST, ALT, LIPASE, BILIDIR, BILITOT, ALKPHOS in the last 72 hours. Invalid input(s): AMYLASE,  ALB  PT/INR: No results for input(s): PROTIME, INR in the last 72 hours. APTT:   No results for input(s): APTT in the last 72 hours. UA:  No results for input(s): NITRITE, COLORU, PHUR, LABCAST, WBCUA, RBCUA, MUCUS, TRICHOMONAS, YEAST, BACTERIA, CLARITYU, SPECGRAV, LEUKOCYTESUR, UROBILINOGEN, BILIRUBINUR, BLOODU, GLUCOSEU, AMORPHOUS in the last 72 hours.     Invalid input(s): [employer]    Cultures:  12/1/2020 SARS-CoV-2 positive  12/2/2020 respiratory culture NRF  12/2/2020 urine no growth  12/12/2020 tracheal aspirate sent    Films:  Chest x-ray 12/14/2020 small bilateral effusions, largely unchanged      12/17/20 UE doppler no DVT      ASSESSMENT:  · Acute hypoxemic respite failure  · COVID-19 viral pneumonia  · Cachexia/failure to thrive  · Acute metabolic encephalopathy superimposed on underlying dementia  · Acute anemia -S/P 2 units PRBC  · Possible brief asystolic arrest early am 40/4/60 with unclear etiology - 30 seconds before ROSC     PLAN:  · Decadron taper at 3  · Completed 5 days Remdesevir; s/p 1 U CCP 12/3/2020  · Completed 7 days ceftriaxone and 5 days azithromycin  · S/P 3 U PRBC    · Electrolyte replacement per internal medicine  · Okay to discharge on Decadron taper  · MINERVA Becca Murry (son)    · Discussed with internal medicine

## 2020-12-21 LAB
ANION GAP SERPL CALCULATED.3IONS-SCNC: 5 MMOL/L (ref 3–16)
BASOPHILS ABSOLUTE: 0 K/UL (ref 0–0.2)
BASOPHILS RELATIVE PERCENT: 1.3 %
BUN BLDV-MCNC: 9 MG/DL (ref 7–20)
CALCIUM SERPL-MCNC: 8.7 MG/DL (ref 8.3–10.6)
CHLORIDE BLD-SCNC: 95 MMOL/L (ref 99–110)
CO2: 31 MMOL/L (ref 21–32)
CREAT SERPL-MCNC: <0.5 MG/DL (ref 0.6–1.2)
EOSINOPHILS ABSOLUTE: 0.1 K/UL (ref 0–0.6)
EOSINOPHILS RELATIVE PERCENT: 3.9 %
GFR AFRICAN AMERICAN: >60
GFR NON-AFRICAN AMERICAN: >60
GLUCOSE BLD-MCNC: 79 MG/DL (ref 70–99)
HCT VFR BLD CALC: 32.6 % (ref 36–48)
HEMOGLOBIN: 10.5 G/DL (ref 12–16)
LYMPHOCYTES ABSOLUTE: 0.7 K/UL (ref 1–5.1)
LYMPHOCYTES RELATIVE PERCENT: 25.6 %
MCH RBC QN AUTO: 25.5 PG (ref 26–34)
MCHC RBC AUTO-ENTMCNC: 32.3 G/DL (ref 31–36)
MCV RBC AUTO: 79 FL (ref 80–100)
MONOCYTES ABSOLUTE: 0.2 K/UL (ref 0–1.3)
MONOCYTES RELATIVE PERCENT: 9.4 %
NEUTROPHILS ABSOLUTE: 1.6 K/UL (ref 1.7–7.7)
NEUTROPHILS RELATIVE PERCENT: 59.8 %
PDW BLD-RTO: 29.5 % (ref 12.4–15.4)
PLATELET # BLD: 111 K/UL (ref 135–450)
PMV BLD AUTO: 8.9 FL (ref 5–10.5)
POTASSIUM SERPL-SCNC: 4.1 MMOL/L (ref 3.5–5.1)
RBC # BLD: 4.12 M/UL (ref 4–5.2)
SARS-COV-2, NAAT: NOT DETECTED
SODIUM BLD-SCNC: 131 MMOL/L (ref 136–145)
WBC # BLD: 2.6 K/UL (ref 4–11)

## 2020-12-21 PROCEDURE — 1200000000 HC SEMI PRIVATE

## 2020-12-21 PROCEDURE — 6370000000 HC RX 637 (ALT 250 FOR IP): Performed by: INTERNAL MEDICINE

## 2020-12-21 PROCEDURE — C9113 INJ PANTOPRAZOLE SODIUM, VIA: HCPCS | Performed by: INTERNAL MEDICINE

## 2020-12-21 PROCEDURE — 80048 BASIC METABOLIC PNL TOTAL CA: CPT

## 2020-12-21 PROCEDURE — 85025 COMPLETE CBC W/AUTO DIFF WBC: CPT

## 2020-12-21 PROCEDURE — 6360000002 HC RX W HCPCS: Performed by: INTERNAL MEDICINE

## 2020-12-21 PROCEDURE — 2580000003 HC RX 258: Performed by: INTERNAL MEDICINE

## 2020-12-21 PROCEDURE — 99232 SBSQ HOSP IP/OBS MODERATE 35: CPT | Performed by: INTERNAL MEDICINE

## 2020-12-21 PROCEDURE — U0002 COVID-19 LAB TEST NON-CDC: HCPCS

## 2020-12-21 RX ORDER — QUETIAPINE FUMARATE 25 MG/1
12.5 TABLET, FILM COATED ORAL 2 TIMES DAILY
Status: DISCONTINUED | OUTPATIENT
Start: 2020-12-21 | End: 2020-12-23 | Stop reason: HOSPADM

## 2020-12-21 RX ORDER — DEXAMETHASONE SODIUM PHOSPHATE 4 MG/ML
1 INJECTION, SOLUTION INTRA-ARTICULAR; INTRALESIONAL; INTRAMUSCULAR; INTRAVENOUS; SOFT TISSUE DAILY
Status: DISCONTINUED | OUTPATIENT
Start: 2020-12-22 | End: 2020-12-23

## 2020-12-21 RX ADMIN — LORAZEPAM 0.5 MG: 0.5 TABLET ORAL at 14:39

## 2020-12-21 RX ADMIN — Medication 10 ML: at 08:23

## 2020-12-21 RX ADMIN — ACETAMINOPHEN 650 MG: 325 TABLET ORAL at 08:24

## 2020-12-21 RX ADMIN — FOLIC ACID 1 MG: 1 TABLET ORAL at 08:23

## 2020-12-21 RX ADMIN — QUETIAPINE FUMARATE 12.5 MG: 25 TABLET ORAL at 11:25

## 2020-12-21 RX ADMIN — DEXAMETHASONE SODIUM PHOSPHATE 3 MG: 4 INJECTION, SOLUTION INTRAMUSCULAR; INTRAVENOUS at 08:23

## 2020-12-21 RX ADMIN — PANTOPRAZOLE SODIUM 40 MG: 40 INJECTION, POWDER, FOR SOLUTION INTRAVENOUS at 08:23

## 2020-12-21 RX ADMIN — Medication 10 ML: at 22:14

## 2020-12-21 ASSESSMENT — PAIN SCALES - PAIN ASSESSMENT IN ADVANCED DEMENTIA (PAINAD)
CONSOLABILITY: 1
TOTALSCORE: 4
FACIALEXPRESSION: 1
NEGVOCALIZATION: 1
BODYLANGUAGE: 0
BREATHING: 1

## 2020-12-21 ASSESSMENT — PAIN SCALES - GENERAL: PAINLEVEL_OUTOF10: 4

## 2020-12-21 NOTE — PROGRESS NOTES
Reported rapid covid results to Dr. Carlo Mcclure; received new order to d/c droplet plus precautions.

## 2020-12-21 NOTE — PROGRESS NOTES
Patient continues to be agitated and upset that she is unable to get out of bed. Redirection ineffective. Continues to place legs over side rails despite staff education. Pt moved closer to nurses station due to increased agitation and falls risk. Spoke with Dr. Deane Frankel regarding continuous telemetry and pulse ox, new order to d/c. Also made him aware of increased agitation, new orders noted for Seroquel. Fall precautions in place. Telesitter remains in room. Call light within reach.

## 2020-12-21 NOTE — PROGRESS NOTES
with yellow urine  Extremities: atrophic ext, deformity of left shoulder with previous humerus fracture, + edema of LUE  No edema or cyanosis. Distal pulses well felt  Neurological : disoriented , non focal  with severe gen weakness     Labs:   Recent Labs     12/19/20  0513 12/20/20  0520 12/21/20  0541   WBC 2.4* 2.6* 2.6*   HGB 10.3* 10.2* 10.5*   HCT 31.7* 31.9* 32.6*   PLT 92* 99* 111*     Recent Labs     12/19/20  0513 12/20/20  0520 12/21/20  0541   * 134* 131*   K 3.0* 3.7 4.1   CL 98* 101 95*   CO2 30 30 31   BUN 13 11 9   CREATININE <0.5* <0.5* <0.5*   CALCIUM 8.4 7.9* 8.7     Radiology:  VL Extremity Venous Left   Final Result      XR CHEST PORTABLE   Final Result   Relatively stable appearance of the chest.  Lucent, hyperinflated lungs   suggest COPD. Blunting versus trace right pleural effusion at the right   costophrenic sulcus. XR CHEST PORTABLE   Final Result   Possible small right pleural effusion. CT CHEST PULMONARY EMBOLISM W CONTRAST   Final Result   1. Small bilateral pleural effusions, right greater than left. XR CHEST PORTABLE   Final Result   1. Endotracheal tube in satisfactory position above the ryley   2. NG tip in gastric fundus and side port near GE junction   3. Otherwise, stable chest         XR CHEST PORTABLE   Final Result   Stable chest demonstrating bilateral pleural effusions. XR CHEST PORTABLE   Final Result   No significant change in appearance of the chest over the past 24 hours with   persistent diffuse bilateral pulmonary disease and trace pleural fluid. There also is diffuse bowel dilation suggesting ileus         XR CHEST PORTABLE   Final Result   Essentially stable exam with probable pulmonary emphysema, and suspected   small pleural effusions with bibasilar atelectasis         XR CHEST PORTABLE   Final Result   Persistent small bilateral pleural effusions. XR CHEST PORTABLE   Final Result   No significant interval change. XR CHEST PORTABLE   Final Result   Stable exam.         XR CHEST PORTABLE   Final Result   Supportive tubing projects in normal position. Persistent pattern of pulmonary edema. Pneumonia remains a differential   possibility. Small right pleural effusion. IR PICC WO SQ PORT/PUMP > 5 YEARS   Final Result      XR CHEST PORTABLE   Final Result   Stable lines and catheters. Persistent multifocal interstitial opacities most pronounced both lung bases. Slight improved aeration of the upper and mid lungs. XR CHEST PORTABLE   Final Result   Worsening airspace opacities especially in the bases. XR CHEST PORTABLE   Final Result   1. Stable appropriate positions of support apparatus. 2. Stable multifocal pneumonia. CT HEAD WO CONTRAST   Final Result   No acute intracranial finding. MRI may be obtained if clinically indicated. XR CHEST PORTABLE   Final Result   Patient is intubated with tubes seen as above. Hyperinflated lungs. No   overt consolidation. Fracture deformity of the left proximal humerus is   incompletely imaged. Recommend comparison with clinical exam and follow-up   with dedicated views. XR CHEST PORTABLE   Final Result   Technically limited study performed portably with arms overlying the lower   chest.  Asymmetric ground-glass opacification appears present in the right   lower lung zone which may represent edema or developing pneumonitis. CULTURES  Resp Cx: pending   Resp Cx (expectorated): NRF  Urine Cx: NGTD   SARS-CoV-2, NAAT DETECTED       Assessment/Plan:    Acute hypoxic and hypercarbic respiratory failure - Resolved  - 2/2 COVID 19  - S/P mechanical ventilation.  - Intubated in ER on 12/1-> Extubated 12/10.  - Transferred out of  ICU on 1.5 L of oxygen  - Acute decompensation with worsening oxygenation 12/12 & Placed on BiPAP initially.  Later transferred back to the ICU and intubated and started on mechanical recurrence- IVF stopped     Severe PCM  Failure to thrive   - extreme muscle wasting.    - dietitian claraal, Continue tube feeds  - checked TSH wnl  - Patient's wt has increased with continued tube feeds since admission--> no longer on TF with no OG tube   - SLP evaluation with recommendations for continued NPO status and re-evaluation on 12/16  - daily weights   -initial diet -  NPO with pleasure feeds  - family agreeable to PEG tube-->patient denies at this time  - seen by speech on 12/19 - rec upgrade to Minced and Moist with thin liquids, no straws and meds crushed in puree    Palliative Care Consulted  - son would like code status changed to DNR CC  - would like to proceed with STR with palliative care  - family would like [address], however d/t + COVID status they are unable to accept unless greater than 21 days from first detection     DVT Prophylaxis: Lovenox qd- given low weight   Diet: Dietary Nutrition Supplements: Standard High Calorie Oral Supplement  DIET DYSPHAGIA MINCED AND MOIST;  Dysphagia Minced and Moist; No Drinking Straw  Code Status: Full Code -->family reports wanting DNR CC but patient expressed wanting Full code     Dc planning to SNF, pt needs to be out of restraints    Yanelis Granados 12/21/2020 7:35 AM

## 2020-12-21 NOTE — PROGRESS NOTES
Shift assessment complete - See flow sheet. Nightly medications given - See [facility]    Patient is currently on RA    SpO2 96%    Lung sounds diminished     Secure device for sandoval and finger PO changed at this time. Pt in soft restraints d/t pulling lines. Patient with no complaints of pain at this time. Patient denies any further needs. Bed alarm is set for patient safety.  Call light explained and in reach

## 2020-12-21 NOTE — PROGRESS NOTES
Pt resting with eyes closed, respirs witnessed as e/e, no signs of distress. SR up x2, bed in lowest  position, wheels locked,bed alarm on, Call light and bedside table in easy reach. Restraints on bilat wrists because pt trying to pull at lines and not being redirectable.    Sarah Marte, RN

## 2020-12-21 NOTE — PROGRESS NOTES
Bilateral wrist restraints removed from patient at 1000. Patient attempting to get out of bed by putting legs over the side rails several times. Provided education about not getting up unassisted and patient upset stating \"I just need to pee. \" Reminded her several times that she has a sandoval. Fall precautions in place. Telesitter in room. Call light within reach.

## 2020-12-21 NOTE — FLOWSHEET NOTE
12/21/20 0810   Vital Signs   Temp 97 °F (36.1 °C)   Temp Source Oral   Pulse 66   Heart Rate Source Monitor   Resp 18   BP (!) 149/72   BP Location Left upper arm   Patient Position Semi fowlers   Level of Consciousness Alert (0)   MEWS Score 1   Oxygen Therapy   O2 Device None (Room air)   Pt resting in bed at this time, alert but pleasantly confused. Pt oriented to self and was able to tell writer the month and day she was born. Received AM medications per order. PRN Tylenol administered at 0820 for generalized discomfort. Assessment complete, see flow sheet. Resp e/e, no s/s of distress noted. Remains in wrist restraints at this time due to pulling at lines and attempting to get up unassisted. Obtained rapid Covid test per MD order. Fall precautions in place. Telesitter in room. Call light within reach.

## 2020-12-21 NOTE — CARE COORDINATION
INTERDISCIPLINARY PLAN OF CARE CONFERENCE    Date/Time: 12/21/2020 1:29 PM  Completed by: Jere Craig, Case Management      Patient Name:  Rama Lambert  YOB: 1950  Admitting Diagnosis: Failure to thrive in adult [R62.7]  Acute respiratory failure with hypoxia ([facility] [address] [J96.01]     Admit Date/Time:  12/1/2020  5:50 AM    Chart reviewed. Interdisciplinary team contacted or reviewed plan related to patient progress and discharge plans. Disciplines included Case Management, Nursing, and Dietitian. Current Status: inpatient  PT/OT recommendation for discharge plan of care:  SNF    Expected D/C Disposition:  Skilled nursing facility  Confirmed plan with patient and/or family Yes confirmed with: (name) son, Leonard Mcarthur    Discharge Plan Comments:  Spoke with Tesha Burleson at [facility] and she states that they cannot accept patient d/t behaviors constantly requiring restraints. Called [facility] and she states she does not have a bed on their behavioral unit. Floyd Flores, son, and he is agreeable to trying [facility]. Spoke with Cinthya Spears and she took the referral but states patient must be out of restraints 24hrs.     Home O2 in place on admit: No

## 2020-12-21 NOTE — PROGRESS NOTES
Patient continues to attempt to self-transfer from bed making statements such as \"I need to get out of here. \" Appears anxious at times due to repetitive behaviors. Administered PRN Ativan at this time to help with increased anxiety. Telesitter in room, call light within reach. Bed alarm in place.

## 2020-12-21 NOTE — PROGRESS NOTES
Pulmonary Progress Note      CC: COVID-19, hypoxemia, AMS    Subjective:    Confused  Room air      Invasive Lines:   12/4/2020 PICC    MV: 12/1/2020-12/10/2020; reintubated on 12/12/2020 for severe hypoxemia/presumably secondary to secretions  Vent Mode: AC/VC Rate Set: 20 bmp/Vt Ordered: 290 mL/ /FiO2 : 25 %  No results for input(s): PHART, CTU4NBX, PO2ART in the last 72 hours. IV:   norepinephrine      dextrose         EXAM:  BP (!) 158/81   Pulse 76   Temp 98.2 °F (36.8 °C) (Oral)   Resp 18   Ht 5' 2\" (1.575 m)   Wt 62 lb 4.8 oz (28.3 kg)   SpO2 99%   BMI 11.39 kg/m²  Room air    Intake/Output Summary (Last 24 hours) at 12/21/2020 0714  Last data filed at 12/21/2020 0208  Gross per 24 hour   Intake 200 ml   Output 2250 ml   Net -2050 ml     EXAM    Constitutional:  No acute distress. Sydni Dye HEENT: no scleral icterus  Neck: No tracheal deviation present. Cardiovascular: Normal heart sounds. Pulmonary/Chest: No wheezes. No rhonchi. Few rales. No decreased breath sounds. No accessory muscle usage or stridor. Abdominal: Soft. Musculoskeletal: No cyanosis. No clubbing. Skin: Skin is warm and dry.        Medications:   dexamethasone  3 mg Intravenous Daily    enoxaparin  30 mg Subcutaneous Daily    pantoprazole  40 mg Intravenous Daily    sodium chloride flush  10 mL Intravenous 2 times per day    folic acid  1 mg Oral Daily     PRN Meds:  LORazepam, midazolam, norepinephrine, iopamidol, ipratropium-albuterol, sodium chloride, sodium chloride flush, acetaminophen **OR** acetaminophen, polyethylene glycol, promethazine **OR** ondansetron, glucose, dextrose, glucagon (rDNA), dextrose, potassium chloride **OR** potassium alternative oral replacement **OR** potassium chloride, magnesium sulfate    Results:  CBC:   Recent Labs     12/19/20  0513 12/20/20  0520 12/21/20  0541   WBC 2.4* 2.6* 2.6*   HGB 10.3* 10.2* 10.5*   HCT 31.7* 31.9* 32.6*   MCV 79.7* 79.7* 79.0*   PLT 92* 99* 111*     BMP: Recent Labs     12/19/20  0513 12/20/20  0520 12/21/20  0541   * 134* 131*   K 3.0* 3.7 4.1   CL 98* 101 95*   CO2 30 30 31   BUN 13 11 9   CREATININE <0.5* <0.5* <0.5*     LIVER PROFILE:   No results for input(s): AST, ALT, LIPASE, BILIDIR, BILITOT, ALKPHOS in the last 72 hours. Invalid input(s): AMYLASE,  ALB  PT/INR: No results for input(s): PROTIME, INR in the last 72 hours. APTT:   No results for input(s): APTT in the last 72 hours. UA:  No results for input(s): NITRITE, COLORU, PHUR, LABCAST, WBCUA, RBCUA, MUCUS, TRICHOMONAS, YEAST, BACTERIA, CLARITYU, SPECGRAV, LEUKOCYTESUR, UROBILINOGEN, BILIRUBINUR, BLOODU, GLUCOSEU, AMORPHOUS in the last 72 hours. Invalid input(s): [facility]    Cultures:  12/1/2020 SARS-CoV-2 positive  12/2/2020 respiratory culture NRF  12/2/2020 urine no growth  12/12/2020 tracheal aspirate sent    Films:  Chest x-ray 12/14/2020 small bilateral effusions, largely unchanged      12/17/20 UE doppler no DVT      ASSESSMENT:  · Acute hypoxemic respite failure  · COVID-19 viral pneumonia  · Cachexia/failure to thrive  · Acute metabolic encephalopathy superimposed on underlying dementia  · Acute anemia -S/P 2 units PRBC  · Possible brief asystolic arrest early am 55/6/05 with unclear etiology - 30 seconds before ROSC     PLAN:  · Completed 5 days Remdesevir; s/p 1 U CCP 12/3/2020  · Completed 7 days ceftriaxone and 5 days azithromycin  · S/P 3 U PRBC    · Decreased Decadron 1 mg   · 20-day since original diagnosis of COVID-19 12/1/2020. DC isolation if repear rapid Covid negative.   · Discussed with internal medicine

## 2020-12-22 LAB
GLUCOSE BLD-MCNC: 89 MG/DL (ref 70–99)
PERFORMED ON: NORMAL

## 2020-12-22 PROCEDURE — 1200000000 HC SEMI PRIVATE

## 2020-12-22 PROCEDURE — 99232 SBSQ HOSP IP/OBS MODERATE 35: CPT | Performed by: INTERNAL MEDICINE

## 2020-12-22 PROCEDURE — 6370000000 HC RX 637 (ALT 250 FOR IP): Performed by: INTERNAL MEDICINE

## 2020-12-22 PROCEDURE — C9113 INJ PANTOPRAZOLE SODIUM, VIA: HCPCS | Performed by: INTERNAL MEDICINE

## 2020-12-22 PROCEDURE — 6360000002 HC RX W HCPCS: Performed by: INTERNAL MEDICINE

## 2020-12-22 PROCEDURE — 2580000003 HC RX 258: Performed by: INTERNAL MEDICINE

## 2020-12-22 RX ORDER — DEXAMETHASONE 0.5 MG/1
TABLET ORAL
Qty: 6 TABLET | Refills: 0 | Status: ON HOLD
Start: 2020-12-22 | End: 2021-05-07 | Stop reason: HOSPADM

## 2020-12-22 RX ORDER — QUETIAPINE FUMARATE 25 MG/1
12.5 TABLET, FILM COATED ORAL 2 TIMES DAILY
Qty: 30 TABLET | Refills: 0
Start: 2020-12-22

## 2020-12-22 RX ADMIN — Medication 10 ML: at 08:30

## 2020-12-22 RX ADMIN — QUETIAPINE FUMARATE 12.5 MG: 25 TABLET ORAL at 21:53

## 2020-12-22 RX ADMIN — DEXAMETHASONE SODIUM PHOSPHATE 1 MG: 4 INJECTION, SOLUTION INTRAMUSCULAR; INTRAVENOUS at 08:29

## 2020-12-22 RX ADMIN — FOLIC ACID 1 MG: 1 TABLET ORAL at 08:30

## 2020-12-22 RX ADMIN — LORAZEPAM 0.5 MG: 0.5 TABLET ORAL at 16:25

## 2020-12-22 RX ADMIN — ENOXAPARIN SODIUM 30 MG: 30 INJECTION SUBCUTANEOUS at 08:29

## 2020-12-22 RX ADMIN — QUETIAPINE FUMARATE 12.5 MG: 25 TABLET ORAL at 08:30

## 2020-12-22 RX ADMIN — PANTOPRAZOLE SODIUM 40 MG: 40 INJECTION, POWDER, FOR SOLUTION INTRAVENOUS at 08:29

## 2020-12-22 NOTE — PROGRESS NOTES
Handoff report and transfer of care given to JOSE EDUARDO Berry. Pt in stable condition. Call light within reach. Bed locked in lowest position. Denies further needs at this time.

## 2020-12-22 NOTE — PROGRESS NOTES
LAST SEEN ON 4/24/2020 NO FOLLOW UP SCHED. Jesus Quintero Pulmonary Progress Note      CC: COVID-19, hypoxemia, AMS    Subjective:    Confused  Room air      Invasive Lines:   12/4/2020 PICC    MV: 12/1/2020-12/10/2020; reintubated on 12/12/2020 for severe   hypoxemia/presumably secondary to secretions    Vent Mode: AC/VC Rate Set: 20 bmp/Vt Ordered: 290 mL/ /FiO2 : 25 %  No results for input(s): PHART, BUH1RYS, PO2ART in the last 72 hours. IV:   norepinephrine      dextrose         EXAM:  /73   Pulse 72   Temp 97.7 °F (36.5 °C) (Oral)   Resp 17   Ht 5' 2\" (1.575 m)   Wt 60 lb 1.6 oz (27.3 kg)   SpO2 93%   BMI 10.99 kg/m²  Room air    Intake/Output Summary (Last 24 hours) at 12/22/2020 0645  Last data filed at 12/22/2020 0405  Gross per 24 hour   Intake 240 ml   Output 2275 ml   Net -2035 ml     EXAM    Constitutional:  No acute distress. Robson Butt HEENT: no scleral icterus  Neck: No tracheal deviation present. Cardiovascular: Normal heart sounds. Pulmonary/Chest: No wheezes. No rhonchi. Few rales. No decreased breath sounds. No accessory muscle usage or stridor. Abdominal: Soft. Musculoskeletal: No cyanosis. No clubbing. Skin: Skin is warm and dry.        Medications:   QUEtiapine  12.5 mg Oral BID    dexamethasone  1 mg Intravenous Daily    enoxaparin  30 mg Subcutaneous Daily    pantoprazole  40 mg Intravenous Daily    sodium chloride flush  10 mL Intravenous 2 times per day    folic acid  1 mg Oral Daily     PRN Meds:  LORazepam, midazolam, norepinephrine, iopamidol, ipratropium-albuterol, sodium chloride, sodium chloride flush, acetaminophen **OR** acetaminophen, polyethylene glycol, promethazine **OR** ondansetron, glucose, dextrose, glucagon (rDNA), dextrose, potassium chloride **OR** potassium alternative oral replacement **OR** potassium chloride, magnesium sulfate    Results:  CBC:   Recent Labs     12/20/20  0520 12/21/20  0541   WBC 2.6* 2.6*   HGB 10.2* 10.5*   HCT 31.9* 32.6*   MCV 79.7* 79.0*   PLT 99* 111*     BMP:   Recent Labs     12/20/20  0520 12/21/20  0541   * 131*   K 3.7 4.1    95*   CO2 30 31   BUN 11 9   CREATININE <0.5* <0.5*     LIVER PROFILE:   No results for input(s): AST, ALT, LIPASE, BILIDIR, BILITOT, ALKPHOS in the last 72 hours. Invalid input(s): AMYLASE,  ALB  PT/INR: No results for input(s): PROTIME, INR in the last 72 hours. APTT:   No results for input(s): APTT in the last 72 hours. UA:  No results for input(s): NITRITE, COLORU, PHUR, LABCAST, WBCUA, RBCUA, MUCUS, TRICHOMONAS, YEAST, BACTERIA, CLARITYU, SPECGRAV, LEUKOCYTESUR, UROBILINOGEN, BILIRUBINUR, BLOODU, GLUCOSEU, AMORPHOUS in the last 72 hours.     Invalid input(s): [place]    Cultures:  12/1/2020 SARS-CoV-2 positive  12/2/2020 respiratory culture NRF  12/2/2020 urine no growth  12/12/2020 tracheal aspirate sent    Films:  Chest x-ray 12/14/2020 small bilateral effusions, largely unchanged      12/17/20 UE doppler no DVT      ASSESSMENT:  · Acute hypoxemic respite failure  · COVID-19 viral pneumonia  · Cachexia/failure to thrive  · Acute metabolic encephalopathy superimposed on underlying dementia  · Acute anemia -S/P 3 units PRBC  · Possible brief asystolic arrest early am 20/6/67 with unclear etiology - 30 seconds before ROSC     PLAN:  · Completed 5 days Remdesevir; s/p 1 U CCP 12/3/2020  · Completed 7 days ceftriaxone and 5 days azithromycin  · Decadron 1 mg daily for 3 days then stop  · DC droplet plus isolation-20-day since original diagnosis of COVID-19 12/1/2020 and negative rapid Covid NAAT   · I recommend CXR in 4-6 week to document resolution of abnormalities   · Discussed with internal medicine

## 2020-12-22 NOTE — DISCHARGE SUMMARY
Name:  Lori Yu  Room:  0226/0226-02  MRN:    [id number]    Discharge Summary      This discharge summary is in conjunction with a complete physical exam done on the day of discharge. Discharging Physician: Dr. Hernandez Rise: 12/1/2020  Discharge: 12/23/2020  HPI taken from admission H&P:    The patient is a 78 y.o. female with reported history of dementia, reported history of alcohol abuse, prior admission to hospital for FTT (2013), gastric ulcer (2013) who presented to [facility] ED with complaint of \"failure to thrive\". Patient is awake on my exam, she will tell me her name but she cannot tell me where she is or the month of the year. She cannot provide any history due to her mentation. ER provider spoke with the patient's son who stated she stopped eating three for 4 days ago and would only take little sips of water. Family sent her to the ER for further evaluation due to this.     In the ER she was afebrile, borderline low blood pressure, hypoxic on room air requiring 4 L of nasal cannula oxygen. She does not use home oxygen. Her rapid Covid test is positive. Chest x-ray showed asymmetric groundglass opacifications in the right lower lung. Initial potassium was 6.2 (?hemolyzed), her nurse tells me she received partial doses of insulin and calcium gluconate as well as IV fluids, repeat potassium level 3.8.     When I saw her in the emergency department she was lethargic, continually removing her oxygen with subsequent desaturations to the 70s. When wearing 4 L nasal cannula her oxygen saturation was stable. I later discussed with ER nurse, she was subsequently intubated for inability to be compliant with nasal cannula oxygen.   She will be admitted to the ICU    Diagnoses this Admission and Hospital Course   Acute hypoxic and hypercarbic respiratory failure - Resolved  - 2/2 COVID 19  - S/P mechanical ventilation.  - Intubated in ER on 12/1-> Extubated 12/10.  - Transferred out of ICU on 1.5 L of oxygen  - Acute decompensation with worsening oxygenation 12/12 & Placed on BiPAP initially. Later transferred back to the ICU and intubated and started on mechanical ventilation  - Dyqeoxwscnm87/12, Extubated 12/14.  - On FiO2 30% per Vapotherm. --> now on RA     COVID 19 Pneumonia  - Pulmonary consulted  - Completed 10 days of Decadron - being tapered  - s/p remdesivir D5 and s/p CCP on 12/1  - completed Rocephin and Zithromax 5/5  - imaging with stable ASD   - Droplet plus isolation-->d/c, repeat rapid COVID was negative     Sepsis - resolved  - tachycardia, tachypnea, leucocytosis  - 2/2 COVID-19 pneumonia  - Antibiotics as above  - Follow-up cultures - negative     Brief Asystole with CPR requirement on 12/3 during nursing care  - no meds given, ROSC with in few sec  - stable since      Acute metabolic encephalopathy - improved  - Suspect related to hypercarbia, sepsis, acute infection  - has a nonfocal neurologic exam, moving all extremities. does have a deformed left upper extremity secondary to a distal humerus fracture which was never repaired.   - Management of sepsis, respiratory failure and infection as above  - Checked CT head - neg    - Checked TSH (normal),  Low folic acid - replace  - Checked ethanol level- neg  - Mental status improved- still with intermittent confusion noted, add seroquel at night  - mentation stable this am.       Acute blood loss anemia, on chronic anemia  History of peptic ulcer disease  - Gastric ulcer noted on EGD in 2013  - Start IV PPI bid , h.h dropped from 8.7 to 6.5, partly with IV fluid boluses   - s.p 1 unit pRBC   - iron studies with folate and iron def, started supplements   - Hg 6.9  - transfused 2  unit of red cells   - Hb 12.3-->13.3--11.7-->11.5 > 11.8 > 11.6 > 10.3--10.5     TCP  - Plt slowly trending down  - 123 > 110 > 92 > 111  - monitor with lovenox use     Left distal humerus fracture 2018  - Chronic deformity of the left upper extremity     Reported history of alcohol abuse  - Unclear if she is still consuming alcohol. - Checked an ethanol level- negative     Hypoglycemia - improved   - on D10 fluids, should consider PEG tube for feeding - but pt refused   - no recurrence- IVF stopped      Severe PCM  Failure to thrive   - extreme muscle wasting.    - dietitian yoel, Continue tube feeds  - checked TSH wnl  - Patient's wt has increased with continued tube feeds since admission--> no longer on TF with no OG tube   - SLP evaluation with recommendations for continued NPO status and re-evaluation on 12/16  - daily weights   -initial diet -  NPO with pleasure feeds  - family agreeable to PEG tube-->patient denies at this time  - seen by speech on 12/19 - rec upgrade to Minced and Moist with thin liquids, no straws and meds crushed in puree     Palliative Care Consulted  - son would like code status changed to DNR CC  - would like to proceed with STR with palliative care  - family would like [address], however d/t + COVID status they are unable to accept unless greater than 21 days from first detection    Procedures (Please Review Full Report for Details)  Intubation/extubation     Consults    Pulmonology       Physical Exam at Discharge:    /74   Pulse 83   Temp 98.1 °F (36.7 °C) (Oral)   Resp 16   Ht 5' 2\" (1.575 m)   Wt 60 lb 1.6 oz (27.3 kg)   SpO2 93%   BMI 10.99 kg/m²     General: thin eldelry female, cachetic appearance  Severe muscle wasting in upper, lower ext and facial muscles    Awake, alert and disoriented.  Appears to be not in any distress  Mucous Membranes:  Pink , anicteric  Neck: No JVD, no carotid bruit, no thyromegaly  Chest:  Clear to auscultation bilaterally, no added sounds  Cardiovascular:  RRR S1S2 heard, no murmurs or gallops  Abdomen/:  Soft, undistended, non tender, no organomegaly, BS present, + sandoval in place with yellow urine  Extremities: atrophic ext, deformity of left shoulder with previous humerus fracture, + edema of LUE  No edema or cyanosis. Distal pulses well felt  Neurological : disoriented , non focal  with severe gen weakness        CBC:   Recent Labs     12/20/20  0520 12/21/20  0541   WBC 2.6* 2.6*   HGB 10.2* 10.5*   HCT 31.9* 32.6*   MCV 79.7* 79.0*   PLT 99* 111*     BMP:   Recent Labs     12/20/20  0520 12/21/20  0541   * 131*   K 3.7 4.1    95*   CO2 30 31   BUN 11 9   CREATININE <0.5* <0.5*     CULTURES  Resp Cx: pending   Resp Cx (expectorated): NRF  Urine Cx: NGTD   SARS-CoV-2, NAAT DETECTED     Repeat 12/21/20  SARS-CoV-2, NAAT Not Detected      RADIOLOGY  VL Extremity Venous Left   Final Result      XR CHEST PORTABLE   Final Result   Relatively stable appearance of the chest.  Lucent, hyperinflated lungs   suggest COPD. Blunting versus trace right pleural effusion at the right   costophrenic sulcus. XR CHEST PORTABLE   Final Result   Possible small right pleural effusion. CT CHEST PULMONARY EMBOLISM W CONTRAST   Final Result   1. Small bilateral pleural effusions, right greater than left. XR CHEST PORTABLE   Final Result   1. Endotracheal tube in satisfactory position above the ryley   2. NG tip in gastric fundus and side port near GE junction   3. Otherwise, stable chest         XR CHEST PORTABLE   Final Result   Stable chest demonstrating bilateral pleural effusions. XR CHEST PORTABLE   Final Result   No significant change in appearance of the chest over the past 24 hours with   persistent diffuse bilateral pulmonary disease and trace pleural fluid. There also is diffuse bowel dilation suggesting ileus         XR CHEST PORTABLE   Final Result   Essentially stable exam with probable pulmonary emphysema, and suspected   small pleural effusions with bibasilar atelectasis         XR CHEST PORTABLE   Final Result   Persistent small bilateral pleural effusions. XR CHEST PORTABLE   Final Result   No significant interval change.          XR CHEST PORTABLE   Final Result   Stable exam.         XR CHEST PORTABLE   Final Result   Supportive tubing projects in normal position. Persistent pattern of pulmonary edema. Pneumonia remains a differential   possibility. Small right pleural effusion. IR PICC WO SQ PORT/PUMP > 5 YEARS   Final Result      XR CHEST PORTABLE   Final Result   Stable lines and catheters. Persistent multifocal interstitial opacities most pronounced both lung bases. Slight improved aeration of the upper and mid lungs. XR CHEST PORTABLE   Final Result   Worsening airspace opacities especially in the bases. XR CHEST PORTABLE   Final Result   1. Stable appropriate positions of support apparatus. 2. Stable multifocal pneumonia. CT HEAD WO CONTRAST   Final Result   No acute intracranial finding. MRI may be obtained if clinically indicated. XR CHEST PORTABLE   Final Result   Patient is intubated with tubes seen as above. Hyperinflated lungs. No   overt consolidation. Fracture deformity of the left proximal humerus is   incompletely imaged. Recommend comparison with clinical exam and follow-up   with dedicated views. XR CHEST PORTABLE   Final Result   Technically limited study performed portably with arms overlying the lower   chest.  Asymmetric ground-glass opacification appears present in the right   lower lung zone which may represent edema or developing pneumonitis. Discharge Medications     Medication List      You have not been prescribed any medications. Discharged in stable condition to [facility]    Follow Up: Follow up with physician at [facility]        Total time spent on discharge is 35 minutes    JAMES Reis.

## 2020-12-22 NOTE — PROGRESS NOTES
Shift assessment complete; see flow sheet. IV infusing without difficulty. Pt on room air. Pt refusing to take oral medicine, closing lips tightly after encouraging her to take it. Pt drowsy, somewhat cooperative, but not completely. Elbows and heels assessed, but RADHA sacrum. Will continue to monitor and reassess at later time hopefully with more pt cooperation. Call light within reach, bed in low locked position, bed alarm on, telesitter in place.

## 2020-12-23 VITALS
HEART RATE: 105 BPM | DIASTOLIC BLOOD PRESSURE: 61 MMHG | TEMPERATURE: 98.9 F | OXYGEN SATURATION: 93 % | HEIGHT: 62 IN | BODY MASS INDEX: 11.06 KG/M2 | WEIGHT: 60.1 LBS | SYSTOLIC BLOOD PRESSURE: 116 MMHG | RESPIRATION RATE: 18 BRPM

## 2020-12-23 PROCEDURE — 99232 SBSQ HOSP IP/OBS MODERATE 35: CPT | Performed by: INTERNAL MEDICINE

## 2020-12-23 PROCEDURE — 6360000002 HC RX W HCPCS: Performed by: INTERNAL MEDICINE

## 2020-12-23 PROCEDURE — 99239 HOSP IP/OBS DSCHRG MGMT >30: CPT | Performed by: INTERNAL MEDICINE

## 2020-12-23 PROCEDURE — 6370000000 HC RX 637 (ALT 250 FOR IP): Performed by: INTERNAL MEDICINE

## 2020-12-23 PROCEDURE — C9113 INJ PANTOPRAZOLE SODIUM, VIA: HCPCS | Performed by: INTERNAL MEDICINE

## 2020-12-23 PROCEDURE — 2580000003 HC RX 258: Performed by: INTERNAL MEDICINE

## 2020-12-23 RX ADMIN — PANTOPRAZOLE SODIUM 40 MG: 40 INJECTION, POWDER, FOR SOLUTION INTRAVENOUS at 09:52

## 2020-12-23 RX ADMIN — ENOXAPARIN SODIUM 30 MG: 30 INJECTION SUBCUTANEOUS at 09:52

## 2020-12-23 RX ADMIN — DEXAMETHASONE SODIUM PHOSPHATE 1 MG: 4 INJECTION, SOLUTION INTRAMUSCULAR; INTRAVENOUS at 09:53

## 2020-12-23 RX ADMIN — Medication 10 ML: at 09:52

## 2020-12-23 RX ADMIN — FOLIC ACID 1 MG: 1 TABLET ORAL at 09:53

## 2020-12-23 RX ADMIN — QUETIAPINE FUMARATE 12.5 MG: 25 TABLET ORAL at 09:53

## 2020-12-23 NOTE — DISCHARGE INSTR - COC
Continuity of Care Form    Patient Name: Linward Bence   :  1950  MRN:  [id number]    Admit date:  2020  Discharge date:  20    Code Status Order: Full Code   Advance Directives:   [facility] Documentation       Date/Time Healthcare Directive Type of Healthcare Directive Copy in [facility] [address] Agent's Name Healthcare Agent's Phone Number    20 0321  Unknown, patient unable to respond due to medical condition -- -- -- -- --            Admitting Physician:  Prieto García MD  PCP: No primary care provider on file. Discharging Nurse: [facility] Unit/Room#: 0226/0226-02  Discharging Unit Phone Number: [phone]    Emergency Contact:   Extended Emergency Contact Information  Primary Emergency Contact: Ton Motta  Address: [id number] [place] [address], [address] [place] [address] Phone: [phone]  Relation: Child  Secondary Emergency Contact: She [address], [address] [facility] Phone: [phone]  Relation: None   needed?  No    Past Surgical History:  Past Surgical History:   Procedure Laterality Date    TUBAL LIGATION         Immunization History:   Immunization History   Administered Date(s) Administered    Influenza Virus Vaccine 2013    Influenza, Quadv, IM, PF (6 mo and older Fluzone, Flulaval, Fluarix, and 3 yrs and older Afluria) 2020    Pneumococcal Polysaccharide ([id number]) 2013       Active Problems:  Patient Active Problem List   Diagnosis Code    Gastric ulcer K25.9    BELKYS (acute kidney injury) ([facility] [address] N17.9    Failure to thrive [id number]    Alcohol abuse F10.10    Dementia ([facility] [address] F03.90    Pulmonary nodules R91.8    Closed fracture of left proximal humerus S42.202A    Closed fracture of left distal humerus S42.402A    Acute metabolic encephalopathy Y72.69    Pneumonia due to COVID-19 virus U07.1, J12.89    Acute respiratory failure with hypoxemia (HCC) J96.01    Acute respiratory failure with hypoxia (HCC) J96.01    COVID-19 virus detected U07.1    Hyperkalemia E87.5    Sepsis with acute hypercapnic respiratory failure without septic shock (HCC) A41.9, R65.20, J96.02    Severe protein-calorie malnutrition Monda Amasa: less than 60% of standard weight) (HCC) E43    Anorexia R63.0    Acute respiratory failure with hypoxia and hypercapnia (HCC) J96.01, J96.02    Tachycardia R00.0    Failure to thrive (0-17) R62.51    Acute encephalopathy G93.40       Isolation/Infection:   Isolation            No Isolation          Patient Infection Status       Infection Onset Added Last Indicated Last Indicated By Review Planned Expiration Resolved Resolved By    None active    Resolved    COVID-19 12/01/20 12/01/20 12/01/20 COVID-19   12/21/20 Emely Montalvo RN    COVID-19 Rule Out 12/01/20 12/01/20 12/01/20 COVID-19 (Ordered)   12/01/20 Rule-Out Test Resulted            Nurse Assessment:  Last Vital Signs: /65   Pulse 73   Temp 99 °F (37.2 °C) (Oral)   Resp 20   Ht 5' 2\" (1.575 m)   Wt 60 lb 1.6 oz (27.3 kg)   SpO2 93%   BMI 10.99 kg/m²     Last documented pain score (0-10 scale): Pain Level: 4  Last Weight:   Wt Readings from Last 1 Encounters:   12/22/20 60 lb 1.6 oz (27.3 kg)     Mental Status:  oriented, alert and confusion at times    IV Access:  - None    Nursing Mobility/ADLs:  Walking   Assisted  Transfer  Assisted  Bathing  Assisted  Dressing  Assisted  Toileting  Assisted  Feeding  Assisted  Med Admin  Assisted  Med Delivery   whole    Wound Care Documentation and Therapy:  Pressure Ulcer 11/13/13 Sacrum Stage I non blanchable redness to sacrum (Active)   Number of days: 2597       Pressure Ulcer 11/13/13 Foot Right Stage I (Active)   Number of days: 2597       Pressure Ulcer 11/13/13 Foot Left Stage I (Active)   Number of days: 3822       Wound 12/19/20 Coccyx dti (Active)   Dressing Status Clean;Dry; Intact 12/23/20 1000 [address]    Condition at Discharge: Stable    Rehab Potential (if transferring to Rehab): Fair    Recommended Labs or Other Treatments After Discharge: none    Physician Certification: I certify the above information and transfer of Raymundo Kahn  is necessary for the continuing treatment of the diagnosis listed and that she requires [place] for less 30 days.      Update Admission H&P: No change in H&P    PHYSICIAN SIGNATURE:  Electronically signed by Lesly Castano MD on 12/23/20 at 1:19 PM EST

## 2020-12-23 NOTE — DISCHARGE SUMMARY
Name:  Daphne Ho  Room:  0226/0226-02  MRN:    [id number]    Discharge Summary      This discharge summary is in conjunction with a complete physical exam done on the day of discharge. Discharging Physician: Dr. Rose Loza: 12/1/2020  Discharge: 12/23/2020     HPI taken from admission H&P:    The patient is a 78 y.o. female with reported history of dementia, reported history of alcohol abuse, prior admission to hospital for FTT (2013), gastric ulcer (2013) who presented to [facility] ED with complaint of \"failure to thrive\". Patient is awake on my exam, she will tell me her name but she cannot tell me where she is or the month of the year. She cannot provide any history due to her mentation. ER provider spoke with the patient's son who stated she stopped eating three for 4 days ago and would only take little sips of water. Family sent her to the ER for further evaluation due to this.     In the ER she was afebrile, borderline low blood pressure, hypoxic on room air requiring 4 L of nasal cannula oxygen. She does not use home oxygen. Her rapid Covid test is positive. Chest x-ray showed asymmetric groundglass opacifications in the right lower lung. Initial potassium was 6.2 (?hemolyzed), her nurse tells me she received partial doses of insulin and calcium gluconate as well as IV fluids, repeat potassium level 3.8.     When I saw her in the emergency department she was lethargic, continually removing her oxygen with subsequent desaturations to the 70s. When wearing 4 L nasal cannula her oxygen saturation was stable. I later discussed with ER nurse, she was subsequently intubated for inability to be compliant with nasal cannula oxygen. She will be admitted to the ICU    Diagnoses this Admission and Hospital Course   Acute hypoxic and hypercarbic respiratory failure - Resolved  - 2/2 COVID 19  - S/P mechanical ventilation.  - Intubated in ER on 12/1-> Extubated 12/10. - Transferred out of  ICU on 1.5 L of oxygen  - Acute decompensation with worsening oxygenation 12/12 & Placed on BiPAP initially. Later transferred back to the ICU and intubated and started on mechanical ventilation  - [id number]/12, Extubated 12/14.  - On FiO2 30% per Vapotherm. --> now on RA     COVID 19 Pneumonia  - Pulmonary consulted  - Completed 10 days of Decadron - being tapered  - s/p remdesivir D5 and s/p CCP on 12/1  - completed Rocephin and Zithromax 5/5  - imaging with stable ASD   - Droplet plus isolation-->d/c, repeat rapid COVID was negative     Sepsis - resolved  - tachycardia, tachypnea, leucocytosis  - 2/2 COVID-19 pneumonia  - Antibiotics as above  - Follow-up cultures - negative     Brief Asystole with CPR requirement on 12/3 during nursing care  - no meds given, ROSC with in few sec  - stable since      Acute metabolic encephalopathy - improved  - Suspect related to hypercarbia, sepsis, acute infection  - has a nonfocal neurologic exam, moving all extremities. does have a deformed left upper extremity secondary to a distal humerus fracture which was never repaired.   - Management of sepsis, respiratory failure and infection as above  - Checked CT head - neg    - Checked TSH (normal),  Low folic acid - replace  - Checked ethanol level- neg  - Mental status improved- still with intermittent confusion noted, add seroquel at night  - mentation stable this am.       Acute blood loss anemia, on chronic anemia  History of peptic ulcer disease  - Gastric ulcer noted on EGD in 2013  - Start IV PPI bid , h.h dropped from 8.7 to 6.5, partly with IV fluid boluses   - s.p 1 unit pRBC   - iron studies with folate and iron def, started supplements   - Hg 6.9  - transfused 2  unit of red cells   - Hb 12.3-->13.3--11.7-->11.5 > 11.8 > 11.6 > 10.3--10.5     TCP  - Plt slowly trending down  - 123 > 110 > 92 > 111  - monitor with lovenox use     Left distal humerus fracture 2018 - Chronic deformity of the left upper extremity     Reported history of alcohol abuse  - Unclear if she is still consuming alcohol. - Checked an ethanol level- negative     Hypoglycemia - improved   - on D10 fluids, should consider PEG tube for feeding - but pt refused   - no recurrence- IVF stopped      Severe PCM  Failure to thrive   - extreme muscle wasting.    - dietitian eval, Continue tube feeds  - checked TSH wnl  - Patient's wt has increased with continued tube feeds since admission--> no longer on TF with no OG tube   - SLP evaluation with recommendations for continued NPO status and re-evaluation on 12/16  - daily weights   -initial diet -  NPO with pleasure feeds  - family agreeable to PEG tube-->patient denies at this time  - seen by speech on 12/19 - rec upgrade to Minced and Moist with thin liquids, no straws and meds crushed in puree     Palliative Care Consulted  - son would like code status changed to DNR CC-->patient did not want to change her code status and was oriented during time of discussion   - would like to proceed with STR with palliative care  - family would like [facility], however d/t + COVID status they are unable to accept unless greater than 21 days from first detection    Procedures (Please Review Full Report for Details)  Intubation/Extubation   PICC    Consults    Pulmonology  Palliative Care    Physical Exam at Discharge:    /65   Pulse 73   Temp 99 °F (37.2 °C) (Oral)   Resp 20   Ht 5' 2\" (1.575 m)   Wt 60 lb 1.6 oz (27.3 kg)   SpO2 93%   BMI 10.99 kg/m²     General: thin eldelry female, cachetic appearance  Severe muscle wasting in upper, lower ext and facial muscles    Awake, alert and disoriented.  Appears to be not in any distress  Mucous Membranes:  Pink , anicteric  Neck: No JVD, no carotid bruit, no thyromegaly  Chest:  Clear to auscultation bilaterally, no added sounds  Cardiovascular:  RRR S1S2 heard, no murmurs or gallops Abdomen/:  Soft, undistended, non tender, no organomegaly, BS present, + sandoval in place with yellow urine-->remove  Extremities: atrophic ext, deformity of left shoulder with previous humerus fracture, + edema of LUE  No edema or cyanosis. Distal pulses well felt  Neurological : disoriented , non focal  with severe gen weakness     CBC:   Recent Labs     12/21/20  0541   WBC 2.6*   HGB 10.5*   HCT 32.6*   MCV 79.0*   *     BMP:   Recent Labs     12/21/20  0541   *   K 4.1   CL 95*   CO2 31   BUN 9   CREATININE <0.5*     CULTURES  Resp Cx: pending   Resp Cx (expectorated): NRF  Urine Cx: NGTD   SARS-CoV-2, NAAT DETECTED      Repeat 12/21/20  SARS-CoV-2, NAAT Not Detected      RADIOLOGY  VL Extremity Venous Left   Final Result      XR CHEST PORTABLE   Final Result   Relatively stable appearance of the chest.  Lucent, hyperinflated lungs   suggest COPD. Blunting versus trace right pleural effusion at the right   costophrenic sulcus. XR CHEST PORTABLE   Final Result   Possible small right pleural effusion. CT CHEST PULMONARY EMBOLISM W CONTRAST   Final Result   1. Small bilateral pleural effusions, right greater than left. XR CHEST PORTABLE   Final Result   1. Endotracheal tube in satisfactory position above the ryley   2. NG tip in gastric fundus and side port near GE junction   3. Otherwise, stable chest         XR CHEST PORTABLE   Final Result   Stable chest demonstrating bilateral pleural effusions. XR CHEST PORTABLE   Final Result   No significant change in appearance of the chest over the past 24 hours with   persistent diffuse bilateral pulmonary disease and trace pleural fluid.    There also is diffuse bowel dilation suggesting ileus         XR CHEST PORTABLE   Final Result   Essentially stable exam with probable pulmonary emphysema, and suspected   small pleural effusions with bibasilar atelectasis         XR CHEST PORTABLE   Final Result Persistent small bilateral pleural effusions. XR CHEST PORTABLE   Final Result   No significant interval change. XR CHEST PORTABLE   Final Result   Stable exam.         XR CHEST PORTABLE   Final Result   Supportive tubing projects in normal position. Persistent pattern of pulmonary edema. Pneumonia remains a differential   possibility. Small right pleural effusion. IR PICC WO SQ PORT/PUMP > 5 YEARS   Final Result      XR CHEST PORTABLE   Final Result   Stable lines and catheters. Persistent multifocal interstitial opacities most pronounced both lung bases. Slight improved aeration of the upper and mid lungs. XR CHEST PORTABLE   Final Result   Worsening airspace opacities especially in the bases. XR CHEST PORTABLE   Final Result   1. Stable appropriate positions of support apparatus. 2. Stable multifocal pneumonia. CT HEAD WO CONTRAST   Final Result   No acute intracranial finding. MRI may be obtained if clinically indicated. XR CHEST PORTABLE   Final Result   Patient is intubated with tubes seen as above. Hyperinflated lungs. No   overt consolidation. Fracture deformity of the left proximal humerus is   incompletely imaged. Recommend comparison with clinical exam and follow-up   with dedicated views. XR CHEST PORTABLE   Final Result   Technically limited study performed portably with arms overlying the lower   chest.  Asymmetric ground-glass opacification appears present in the right   lower lung zone which may represent edema or developing pneumonitis.              Discharge Medications     Medication List      START taking these medications    dexamethasone 0.5 MG tablet  Commonly known as: DECADRON  2 qd- 3 days, 1 qd- 4 days     QUEtiapine 25 MG tablet  Commonly known as: SEROQUEL  Take 0.5 tablets by mouth 2 times daily           Where to Get Your Medications Information about where to get these medications is not yet available    Ask your nurse or doctor about these medications  · dexamethasone 0.5 MG tablet  · QUEtiapine 25 MG tablet       Discharged in stable condition to SNF    Follow Up:   Follow up with physician at [facility]

## 2020-12-23 NOTE — PROGRESS NOTES
Speech Language Pathology  Attempt Note    Attempted to see pt for dysphagia tx. Pt as just eaten lunch and pleasantly refuses additional po trials. Pt reports she likes minced and moist solids that she is receiving. ST to f/u on later day as pt is willing and available to participate. Kilo Christine. Brodie Smith M.A.  [id number] [facility]  Speech-Language Pathologist

## 2020-12-23 NOTE — PROGRESS NOTES
See pm shift assessment. Patient frequently getting oob w/o asking for assistance; states she wants to go smoke a cigarette. She is oriented to self and place at this time. Call light in reach. Will continue to monitor.

## 2020-12-23 NOTE — CARE COORDINATION
DISCHARGE ORDER  Date/Time 2020 2:45 PM  Completed by: Becky Rincon, Case Management    Patient Name: Sandra Mohs    : 1950      Admit order Date and Status:  Inpatient 20    Confirmed discharge plan with:              Patient:  No              When pt confirms DC plan does any support person need to be contacted by CM Yes, [address]                    Discharge to Facility:  [address] phone number for staff giving report: [phone]   Pre-certification completed:  [address] Exemption Notification (HENS) completed: yes   Discharge orders and Continuity of Care faxed to facility:  yes      Transportation:               Medical Transport explained with choice list offered to pt/family. Choice:  no preference  Agency used:  [address], [address] up time:   14:20      Pt/family/Nursing/Facility aware of  time:   Yes Names: LVM w/son, Darreld Side at [facility], Sofie Villarreal RN  Ambulance form completed:  yes:      Comments:  AVS/KINGSLEY, and Hens faxed to [facility]    Pt is being d/c'd to NH today. Pt's O2 sats are 93% on RA. Discharge timeout done with Tasha. All discharge needs and concerns addressed. Discharging nurse to complete KINGSLEY, reconcile AVS, and place final copy with patient's discharge packet. Discharging RN to ensure that written prescriptions for  Level II medications are sent with patient to the facility as per protocol.

## 2020-12-23 NOTE — PLAN OF CARE
Problem: Airway Clearance - Ineffective  Goal: Achieve or maintain patent airway  Outcome: Completed     Problem: Gas Exchange - Impaired  Goal: Absence of hypoxia  Outcome: Completed  Goal: Promote optimal lung function  Outcome: Completed     Problem: Breathing Pattern - Ineffective  Goal: Ability to achieve and maintain a regular respiratory rate  Outcome: Completed     Problem:  Body Temperature -  Risk of, Imbalanced  Goal: Ability to maintain a body temperature within defined limits  Outcome: Completed  Goal: Will regain or maintain usual level of consciousness  Outcome: Completed  Goal: Complications related to the disease process, condition or treatment will be avoided or minimized  Outcome: Completed     Problem: Isolation Precautions - Risk of Spread of Infection  Goal: Prevent transmission of infection  Outcome: Completed     Problem: Nutrition Deficits  Goal: Optimize nutrtional status  Outcome: Completed     Problem: Risk for Fluid Volume Deficit  Goal: Maintain normal heart rhythm  Outcome: Completed  Goal: Maintain absence of muscle cramping  Outcome: Completed  Goal: Maintain normal serum potassium, sodium, calcium, phosphorus, and pH  Outcome: Completed     Problem: Loneliness or Risk for Loneliness  Goal: Demonstrate positive use of time alone when socialization is not possible  Outcome: Completed     Problem: Fatigue  Goal: Verbalize increase energy and improved vitality  Outcome: Completed     Problem: Patient Education: Go to Patient Education Activity  Goal: Patient/Family Education  Outcome: Completed     Problem: Restraint Use - Nonviolent/Non-Self-Destructive Behavior:  Goal: Absence of restraint indications  Description: Absence of restraint indications  Outcome: Completed  Goal: Absence of restraint-related injury  Description: Absence of restraint-related injury  Outcome: Completed     Problem: Falls - Risk of:  Goal: Will remain free from falls  Description: Will remain free from falls  Outcome: Completed  Goal: Absence of physical injury  Description: Absence of physical injury  Outcome: Completed     Problem: Skin Integrity:  Goal: Will show no infection signs and symptoms  Description: Will show no infection signs and symptoms  Outcome: Completed  Goal: Absence of new skin breakdown  Description: Absence of new skin breakdown  Outcome: Completed     Problem: Nutrition  Goal: Optimal nutrition therapy  Outcome: Completed  Goal: Understanding of nutritional guidelines  Outcome: Completed     Problem: Infection - Central Venous Catheter-Associated Bloodstream Infection:  Goal: Will show no infection signs and symptoms  Description: Will show no infection signs and symptoms  Outcome: Completed

## 2020-12-23 NOTE — PROGRESS NOTES
Pulmonary Progress Note      CC: COVID-19, hypoxemia, AMS    Subjective:    More awake  Still confused      Invasive Lines:   12/4/2020 PICC    MV: 12/1/2020-12/10/2020; reintubated on 12/12/2020 for severe   hypoxemia/presumably secondary to secretions    Vent Mode: AC/VC Rate Set: 20 bmp/Vt Ordered: 290 mL/ /FiO2 : 25 %  No results for input(s): PHART, TXD6DRH, PO2ART in the last 72 hours. IV:   norepinephrine      dextrose         EXAM:  BP (!) 146/80   Pulse 93   Temp 96.6 °F (35.9 °C) (Oral)   Resp 18   Ht 5' 2\" (1.575 m)   Wt 60 lb 1.6 oz (27.3 kg)   SpO2 97%   BMI 10.99 kg/m²  Room air    Intake/Output Summary (Last 24 hours) at 12/23/2020 0657  Last data filed at 12/23/2020 0528  Gross per 24 hour   Intake 360 ml   Output 1950 ml   Net -1590 ml     EXAM    Constitutional:  No acute distress. Heddy  HEENT: no scleral icterus  Neck: No tracheal deviation present. Cardiovascular: Normal heart sounds. Pulmonary/Chest: No wheezes. No rhonchi. Few rales. No decreased breath sounds. No accessory muscle usage or stridor. Abdominal: Soft. Musculoskeletal: No cyanosis. No clubbing. Skin: Skin is warm and dry.        Medications:   QUEtiapine  12.5 mg Oral BID    dexamethasone  1 mg Intravenous Daily    enoxaparin  30 mg Subcutaneous Daily    pantoprazole  40 mg Intravenous Daily    sodium chloride flush  10 mL Intravenous 2 times per day    folic acid  1 mg Oral Daily     PRN Meds:  LORazepam, midazolam, norepinephrine, iopamidol, ipratropium-albuterol, sodium chloride, sodium chloride flush, acetaminophen **OR** acetaminophen, polyethylene glycol, promethazine **OR** ondansetron, glucose, dextrose, glucagon (rDNA), dextrose, potassium chloride **OR** potassium alternative oral replacement **OR** potassium chloride, magnesium sulfate    Results:  CBC:   Recent Labs     12/21/20  0541   WBC 2.6*   HGB 10.5*   HCT 32.6*   MCV 79.0*   *     BMP:   Recent Labs     12/21/20  0541   NA 131*   K 4.1   CL 95*   CO2 31   BUN 9   CREATININE <0.5*     LIVER PROFILE:   No results for input(s): AST, ALT, LIPASE, BILIDIR, BILITOT, ALKPHOS in the last 72 hours. Invalid input(s): AMYLASE,  ALB  PT/INR: No results for input(s): PROTIME, INR in the last 72 hours. APTT:   No results for input(s): APTT in the last 72 hours. UA:  No results for input(s): NITRITE, COLORU, PHUR, LABCAST, WBCUA, RBCUA, MUCUS, TRICHOMONAS, YEAST, BACTERIA, CLARITYU, SPECGRAV, LEUKOCYTESUR, UROBILINOGEN, BILIRUBINUR, BLOODU, GLUCOSEU, AMORPHOUS in the last 72 hours.     Invalid input(s): Melissa Mcclain    Cultures:  12/1/2020 SARS-CoV-2 positive  12/2/2020 respiratory culture NRF  12/2/2020 urine no growth  12/12/2020 tracheal aspirate sent    Films:  Chest x-ray 12/14/2020 small bilateral effusions, largely unchanged      12/17/20 UE doppler no DVT      ASSESSMENT:  · Acute hypoxemic respite failure  · COVID-19 viral pneumonia  · Cachexia/failure to thrive  · Acute metabolic encephalopathy superimposed on underlying dementia  · Acute anemia -S/P 3 units PRBC  · Possible brief asystolic arrest early am 69/8/46 with unclear etiology - 30 seconds before ROSC     PLAN:  · Completed 5 days Remdesevir; s/p 1 U CCP 12/3/2020  · Completed 7 days ceftriaxone and 5 days azithromycin  · DC Decadron   · Off droplet plus isolation-20-day since original diagnosis of COVID-19 12/1/2020 and negative rapid Covid NAAT   · I recommend CXR in 4-6 week to document resolution of abnormalities

## 2020-12-23 NOTE — PROGRESS NOTES
Shift report received from JOSE EDUARDO Berry. Patient trying to get oob; states she wants to smoke a cigarette. Assisted back to bed. Call light in reach. Will continue to monitor.

## 2021-01-01 ENCOUNTER — HOSPITAL ENCOUNTER (EMERGENCY)
Age: 71
Discharge: HOME | End: 2021-01-01
Payer: MEDICARE

## 2021-01-01 ENCOUNTER — APPOINTMENT (OUTPATIENT)
Dept: GENERAL RADIOLOGY | Age: 71
End: 2021-01-01
Payer: MEDICARE

## 2021-01-01 VITALS
TEMPERATURE: 98.7 F | WEIGHT: 60 LBS | RESPIRATION RATE: 17 BRPM | SYSTOLIC BLOOD PRESSURE: 107 MMHG | BODY MASS INDEX: 11.04 KG/M2 | OXYGEN SATURATION: 97 % | DIASTOLIC BLOOD PRESSURE: 54 MMHG | HEIGHT: 62 IN | HEART RATE: 65 BPM

## 2021-01-01 DIAGNOSIS — M79.602 LEFT ARM PAIN: ICD-10-CM

## 2021-01-01 DIAGNOSIS — S42.212A CLOSED FRACTURE OF NECK OF LEFT HUMERUS, INITIAL ENCOUNTER: ICD-10-CM

## 2021-01-01 DIAGNOSIS — S42.492A OTHER CLOSED DISPLACED FRACTURE OF DISTAL END OF LEFT HUMERUS, INITIAL ENCOUNTER: Primary | ICD-10-CM

## 2021-01-01 LAB
A/G RATIO: 1.1 (ref 1.1–2.2)
ALBUMIN SERPL-MCNC: 3.3 G/DL (ref 3.4–5)
ALP BLD-CCNC: 66 U/L (ref 40–129)
ALT SERPL-CCNC: 15 U/L (ref 10–40)
ANION GAP SERPL CALCULATED.3IONS-SCNC: 5 MMOL/L (ref 3–16)
ANISOCYTOSIS: ABNORMAL
AST SERPL-CCNC: 17 U/L (ref 15–37)
BASOPHILS ABSOLUTE: 0 K/UL (ref 0–0.2)
BASOPHILS RELATIVE PERCENT: 0 %
BILIRUB SERPL-MCNC: <0.2 MG/DL (ref 0–1)
BILIRUBIN URINE: NEGATIVE
BLOOD, URINE: NEGATIVE
BUN BLDV-MCNC: 6 MG/DL (ref 7–20)
CALCIUM SERPL-MCNC: 8.9 MG/DL (ref 8.3–10.6)
CHLORIDE BLD-SCNC: 96 MMOL/L (ref 99–110)
CLARITY: CLEAR
CO2: 32 MMOL/L (ref 21–32)
COLOR: YELLOW
CREAT SERPL-MCNC: <0.5 MG/DL (ref 0.6–1.2)
EKG ATRIAL RATE: 65 BPM
EKG ATRIAL RATE: 67 BPM
EKG DIAGNOSIS: NORMAL
EKG DIAGNOSIS: NORMAL
EKG P AXIS: 87 DEGREES
EKG P AXIS: 87 DEGREES
EKG P-R INTERVAL: 116 MS
EKG P-R INTERVAL: 130 MS
EKG Q-T INTERVAL: 436 MS
EKG Q-T INTERVAL: 452 MS
EKG QRS DURATION: 82 MS
EKG QRS DURATION: 86 MS
EKG QTC CALCULATION (BAZETT): 460 MS
EKG QTC CALCULATION (BAZETT): 470 MS
EKG R AXIS: 89 DEGREES
EKG R AXIS: 92 DEGREES
EKG T AXIS: 70 DEGREES
EKG T AXIS: 70 DEGREES
EKG VENTRICULAR RATE: 65 BPM
EKG VENTRICULAR RATE: 67 BPM
EOSINOPHILS ABSOLUTE: 0 K/UL (ref 0–0.6)
EOSINOPHILS RELATIVE PERCENT: 0 %
GFR AFRICAN AMERICAN: >60
GFR NON-AFRICAN AMERICAN: >60
GLOBULIN: 2.9 G/DL
GLUCOSE BLD-MCNC: 79 MG/DL (ref 70–99)
GLUCOSE URINE: NEGATIVE MG/DL
HCT VFR BLD CALC: 35.5 % (ref 36–48)
HEMOGLOBIN: 11.4 G/DL (ref 12–16)
KETONES, URINE: NEGATIVE MG/DL
LEUKOCYTE ESTERASE, URINE: NEGATIVE
LYMPHOCYTES ABSOLUTE: 1 K/UL (ref 1–5.1)
LYMPHOCYTES RELATIVE PERCENT: 26 %
MCH RBC QN AUTO: 26.3 PG (ref 26–34)
MCHC RBC AUTO-ENTMCNC: 32.3 G/DL (ref 31–36)
MCV RBC AUTO: 81.7 FL (ref 80–100)
MICROSCOPIC EXAMINATION: NORMAL
MONOCYTES ABSOLUTE: 0.2 K/UL (ref 0–1.3)
MONOCYTES RELATIVE PERCENT: 6 %
NEUTROPHILS ABSOLUTE: 2.7 K/UL (ref 1.7–7.7)
NEUTROPHILS RELATIVE PERCENT: 68 %
NITRITE, URINE: NEGATIVE
PDW BLD-RTO: 29.6 % (ref 12.4–15.4)
PH UA: 7.5 (ref 5–8)
PLATELET # BLD: 357 K/UL (ref 135–450)
PLATELET SLIDE REVIEW: ADEQUATE
PMV BLD AUTO: 7.8 FL (ref 5–10.5)
POTASSIUM REFLEX MAGNESIUM: 4 MMOL/L (ref 3.5–5.1)
PRO-BNP: 436 PG/ML (ref 0–124)
PROTEIN UA: NEGATIVE MG/DL
RBC # BLD: 4.34 M/UL (ref 4–5.2)
SLIDE REVIEW: ABNORMAL
SODIUM BLD-SCNC: 133 MMOL/L (ref 136–145)
SPECIFIC GRAVITY UA: 1.01 (ref 1–1.03)
TOTAL PROTEIN: 6.2 G/DL (ref 6.4–8.2)
TROPONIN: <0.01 NG/ML
URINE REFLEX TO CULTURE: NORMAL
URINE TYPE: NORMAL
UROBILINOGEN, URINE: 0.2 E.U./DL
WBC # BLD: 3.9 K/UL (ref 4–11)

## 2021-01-01 PROCEDURE — 81003 URINALYSIS AUTO W/O SCOPE: CPT

## 2021-01-01 PROCEDURE — 84484 ASSAY OF TROPONIN QUANT: CPT

## 2021-01-01 PROCEDURE — 85025 COMPLETE CBC W/AUTO DIFF WBC: CPT

## 2021-01-01 PROCEDURE — 73060 X-RAY EXAM OF HUMERUS: CPT

## 2021-01-01 PROCEDURE — 80053 COMPREHEN METABOLIC PANEL: CPT

## 2021-01-01 PROCEDURE — 6370000000 HC RX 637 (ALT 250 FOR IP): Performed by: NURSE PRACTITIONER

## 2021-01-01 PROCEDURE — 93005 ELECTROCARDIOGRAM TRACING: CPT | Performed by: NURSE PRACTITIONER

## 2021-01-01 PROCEDURE — 99285 EMERGENCY DEPT VISIT HI MDM: CPT

## 2021-01-01 PROCEDURE — 83880 ASSAY OF NATRIURETIC PEPTIDE: CPT

## 2021-01-01 PROCEDURE — 71045 X-RAY EXAM CHEST 1 VIEW: CPT

## 2021-01-01 RX ORDER — HYDROCODONE BITARTRATE AND ACETAMINOPHEN 5; 325 MG/1; MG/1
1 TABLET ORAL ONCE
Status: COMPLETED | OUTPATIENT
Start: 2021-01-01 | End: 2021-01-01

## 2021-01-01 RX ADMIN — HYDROCODONE BITARTRATE AND ACETAMINOPHEN 1 TABLET: 5; 325 TABLET ORAL at 14:05

## 2021-01-01 ASSESSMENT — PAIN SCALES - GENERAL: PAINLEVEL_OUTOF10: 5

## 2021-01-01 ASSESSMENT — PAIN DESCRIPTION - DESCRIPTORS: DESCRIPTORS: ACHING

## 2021-01-01 ASSESSMENT — ENCOUNTER SYMPTOMS
COLOR CHANGE: 0
ABDOMINAL PAIN: 0
SHORTNESS OF BREATH: 0
SORE THROAT: 0
RHINORRHEA: 0

## 2021-01-01 ASSESSMENT — PAIN DESCRIPTION - LOCATION: LOCATION: ARM

## 2021-01-01 ASSESSMENT — PAIN DESCRIPTION - ORIENTATION: ORIENTATION: LEFT

## 2021-01-01 NOTE — ED NOTES
Nurse assisted pt to bedside commode for urination. Pt was able to do so with assistance however it appears to wipe her energy out. Pt had soiled her brief and gown nurse changed these and linens. Pt denies any other needs.      Luh Goldman RN  01/01/21 1119

## 2021-01-01 NOTE — ED NOTES
Green and red top tube drawn with 23g butterfly, original tubes drawn were hemolized     Alessio Solorzano RN  01/01/21 4239

## 2021-01-01 NOTE — ED NOTES
1117- Perfect serve sent to Dr. Nick Avila    26- Dr. Nick Avila returned the call and spoke to [address]  01/01/21 7872

## 2021-01-01 NOTE — ED NOTES
IV d/c'd left foreare that was placed earlier in pt's stay. Tip intact and bandage applied.      Dilma Vitale RN  01/01/21 6296

## 2021-01-07 ENCOUNTER — OFFICE VISIT (OUTPATIENT)
Dept: ORTHOPEDIC SURGERY | Age: 71
End: 2021-01-07
Payer: MEDICARE

## 2021-01-07 VITALS — WEIGHT: 60 LBS | BODY MASS INDEX: 11.04 KG/M2 | HEIGHT: 62 IN

## 2021-01-07 DIAGNOSIS — R52 PAIN: Primary | ICD-10-CM

## 2021-01-07 DIAGNOSIS — S42.292A OTHER CLOSED DISPLACED FRACTURE OF PROXIMAL END OF LEFT HUMERUS, INITIAL ENCOUNTER: ICD-10-CM

## 2021-01-07 PROCEDURE — G8419 CALC BMI OUT NRM PARAM NOF/U: HCPCS | Performed by: PHYSICIAN ASSISTANT

## 2021-01-07 PROCEDURE — 1111F DSCHRG MED/CURRENT MED MERGE: CPT | Performed by: PHYSICIAN ASSISTANT

## 2021-01-07 PROCEDURE — G8482 FLU IMMUNIZE ORDER/ADMIN: HCPCS | Performed by: PHYSICIAN ASSISTANT

## 2021-01-07 PROCEDURE — 3017F COLORECTAL CA SCREEN DOC REV: CPT | Performed by: PHYSICIAN ASSISTANT

## 2021-01-07 PROCEDURE — 4040F PNEUMOC VAC/ADMIN/RCVD: CPT | Performed by: PHYSICIAN ASSISTANT

## 2021-01-07 PROCEDURE — 1090F PRES/ABSN URINE INCON ASSESS: CPT | Performed by: PHYSICIAN ASSISTANT

## 2021-01-07 PROCEDURE — G8400 PT W/DXA NO RESULTS DOC: HCPCS | Performed by: PHYSICIAN ASSISTANT

## 2021-01-07 PROCEDURE — 99213 OFFICE O/P EST LOW 20 MIN: CPT | Performed by: PHYSICIAN ASSISTANT

## 2021-01-07 PROCEDURE — G8427 DOCREV CUR MEDS BY ELIG CLIN: HCPCS | Performed by: PHYSICIAN ASSISTANT

## 2021-01-07 PROCEDURE — 4004F PT TOBACCO SCREEN RCVD TLK: CPT | Performed by: PHYSICIAN ASSISTANT

## 2021-01-07 PROCEDURE — 1123F ACP DISCUSS/DSCN MKR DOCD: CPT | Performed by: PHYSICIAN ASSISTANT

## 2021-01-07 NOTE — PROGRESS NOTES
 Dysphagia     ETOH abuse     May not have had a drink in a day or so.  Failure to thrive     Gastric ulcer     GERD (gastroesophageal reflux disease)     Thyroid disease       Past Surgical History:     Past Surgical History:   Procedure Laterality Date    TUBAL LIGATION       Current Medications:     Current Outpatient Medications:     QUEtiapine (SEROQUEL) 25 MG tablet, Take 0.5 tablets by mouth 2 times daily, Disp: 30 tablet, Rfl: 0    dexamethasone (DECADRON) 0.5 MG tablet, 2 qd- 3 days, 1 qd- 4 days, Disp: 6 tablet, Rfl: 0  Allergies:  No known allergies  Social History:    reports that she has been smoking cigarettes. She has a 45.00 pack-year smoking history. She has never used smokeless tobacco. She reports current alcohol use of about 6.0 standard drinks of alcohol per week. She reports that she does not use drugs. Family History:   Family History   Problem Relation Age of Onset    Diabetes Mother     Heart Disease Father        REVIEW OF SYSTEMS:   For new problems, a full review of systems will be found scanned in the patient's chart. CONSTITUTIONAL: Does complain of weight loss. NEUROLOGICAL: Denies unsteady gait or progressive weakness  SKIN: Denies skin changes, delayed healing, rash, itching       PHYSICAL EXAM:    Vitals: Height 5' 2.01\" (1.575 m), weight 60 lb (27.2 kg). GENERAL EXAM:  · General Apparence: Patient is adequately groomed with no evidence of malnutrition. · Orientation: The patient is oriented to time, person  · Mood & Affect:The patient's mood and affect are appropriate       LEFT arm PHYSICAL EXAMINATION:  · Inspection: She has a deformity of the Lefton arm today consistent with her fracture.   She is very thin    · Palpation: Mild tenderness involving the proximal humerus today, no significant tenderness along the mid distal shaft of the elbow · Range of Motion: She tolerates gentle range of motion of the LEFT elbow today and active range of motion of the LEFT shoulder to about 65 degrees    · Strength:  strength is intact    · Special Tests: Radial pulse 2+          · Skin:  There are no rashes, ulcerations or lesions. · There are no dysvascular changes     Gait & station: He ambulates today with assistance      Additional Examinations:        Right Upper Extremity:  Examination of the right upper extremity does not show any tenderness, deformity or injury. Range of motion is unremarkable. There is no gross instability. There are no rashes, ulcerations or lesions. Strength and tone are normal.      Diagnostic Testing: The following x rays were read and interpreted by myself       2 x-ray views of LEFT humerus were reviewed from January 1, 2021 the patient has essentially fibrous versus nonunion of her proximal and midshaft humeral fractures    Orders     Orders Placed This Encounter   Procedures   Martina Root MD, Orthopedic Surgery, [facility]     Referral Priority:   Routine     Referral Type:   Consult for Advice and Opinion     Referral Reason:   Specialty Services Required     Referred to Provider:   Petar Quezada MD     Requested Specialty:   Orthopedic Surgery Sports Medicine     Number of Visits Requested:   1         Assessment / Treatment Plan:     1. LEFT humerus fractures with nonunion/fibrous union    Discussion with the patient and her daughter. The patient is very thin and was recently diagnosed with failure to thrive. We discussed the risks and benefits of a possible surgical procedure, increased risks with her malnutrition and soft tissue envelope. My recommendation was to continue with conservative treatment but I did certainly recommend that she see Dr. Petar Quezada for consultation.

## 2021-05-04 ENCOUNTER — APPOINTMENT (OUTPATIENT)
Dept: GENERAL RADIOLOGY | Age: 71
DRG: 177 | End: 2021-05-04
Payer: MEDICARE

## 2021-05-04 ENCOUNTER — HOSPITAL ENCOUNTER (INPATIENT)
Age: 71
LOS: 3 days | Discharge: NURSING, REHAB OR LONG-TERM CARE | DRG: 177 | End: 2021-05-07
Attending: EMERGENCY MEDICINE | Admitting: HOSPITALIST
Payer: MEDICARE

## 2021-05-04 DIAGNOSIS — J96.01 ACUTE RESPIRATORY FAILURE WITH HYPOXIA (HCC): Primary | ICD-10-CM

## 2021-05-04 DIAGNOSIS — J18.9 PNEUMONIA DUE TO ORGANISM: ICD-10-CM

## 2021-05-04 LAB
A/G RATIO: 1.1 (ref 1.1–2.2)
ALBUMIN SERPL-MCNC: 4 G/DL (ref 3.4–5)
ALP BLD-CCNC: 115 U/L (ref 40–129)
ALT SERPL-CCNC: 30 U/L (ref 10–40)
ANION GAP SERPL CALCULATED.3IONS-SCNC: 9 MMOL/L (ref 3–16)
AST SERPL-CCNC: 32 U/L (ref 15–37)
BASE EXCESS VENOUS: 7.5 MMOL/L (ref -3–3)
BASOPHILS ABSOLUTE: 0 K/UL (ref 0–0.2)
BASOPHILS RELATIVE PERCENT: 0.7 %
BILIRUB SERPL-MCNC: 0.4 MG/DL (ref 0–1)
BUN BLDV-MCNC: 12 MG/DL (ref 7–20)
CALCIUM SERPL-MCNC: 9.7 MG/DL (ref 8.3–10.6)
CARBOXYHEMOGLOBIN: 1.2 % (ref 0–1.5)
CHLORIDE BLD-SCNC: 84 MMOL/L (ref 99–110)
CO2: 35 MMOL/L (ref 21–32)
CREAT SERPL-MCNC: <0.5 MG/DL (ref 0.6–1.2)
D DIMER: 332 NG/ML DDU (ref 0–229)
EOSINOPHILS ABSOLUTE: 0 K/UL (ref 0–0.6)
EOSINOPHILS RELATIVE PERCENT: 0 %
GFR AFRICAN AMERICAN: >60
GFR NON-AFRICAN AMERICAN: >60
GLOBULIN: 3.6 G/DL
GLUCOSE BLD-MCNC: 160 MG/DL (ref 70–99)
HCO3 VENOUS: 34.5 MMOL/L (ref 23–29)
HCT VFR BLD CALC: 37.5 % (ref 36–48)
HEMOGLOBIN: 12.3 G/DL (ref 12–16)
LACTIC ACID: 1.2 MMOL/L (ref 0.4–2)
LYMPHOCYTES ABSOLUTE: 0.1 K/UL (ref 1–5.1)
LYMPHOCYTES RELATIVE PERCENT: 2.2 %
MCH RBC QN AUTO: 26.7 PG (ref 26–34)
MCHC RBC AUTO-ENTMCNC: 32.8 G/DL (ref 31–36)
MCV RBC AUTO: 81.5 FL (ref 80–100)
METHEMOGLOBIN VENOUS: 0.1 %
MONOCYTES ABSOLUTE: 0.3 K/UL (ref 0–1.3)
MONOCYTES RELATIVE PERCENT: 5.7 %
NEUTROPHILS ABSOLUTE: 5 K/UL (ref 1.7–7.7)
NEUTROPHILS RELATIVE PERCENT: 91.4 %
O2 CONTENT, VEN: 15 VOL %
O2 SAT, VEN: 80 %
O2 THERAPY: ABNORMAL
PCO2, VEN: 60 MMHG (ref 40–50)
PDW BLD-RTO: 14.1 % (ref 12.4–15.4)
PH VENOUS: 7.38 (ref 7.35–7.45)
PLATELET # BLD: 195 K/UL (ref 135–450)
PMV BLD AUTO: 8.4 FL (ref 5–10.5)
PO2, VEN: 46.5 MMHG (ref 25–40)
POTASSIUM SERPL-SCNC: 4.3 MMOL/L (ref 3.5–5.1)
PRO-BNP: 368 PG/ML (ref 0–124)
PROCALCITONIN: 0.26 NG/ML (ref 0–0.15)
RBC # BLD: 4.6 M/UL (ref 4–5.2)
SARS-COV-2, NAAT: NOT DETECTED
SODIUM BLD-SCNC: 128 MMOL/L (ref 136–145)
TCO2 CALC VENOUS: 36 MMOL/L
TOTAL PROTEIN: 7.6 G/DL (ref 6.4–8.2)
TROPONIN: <0.01 NG/ML
WBC # BLD: 5.5 K/UL (ref 4–11)

## 2021-05-04 PROCEDURE — 2580000003 HC RX 258: Performed by: EMERGENCY MEDICINE

## 2021-05-04 PROCEDURE — 87635 SARS-COV-2 COVID-19 AMP PRB: CPT

## 2021-05-04 PROCEDURE — 96365 THER/PROPH/DIAG IV INF INIT: CPT

## 2021-05-04 PROCEDURE — 71046 X-RAY EXAM CHEST 2 VIEWS: CPT

## 2021-05-04 PROCEDURE — 82803 BLOOD GASES ANY COMBINATION: CPT

## 2021-05-04 PROCEDURE — 84484 ASSAY OF TROPONIN QUANT: CPT

## 2021-05-04 PROCEDURE — 6360000002 HC RX W HCPCS: Performed by: EMERGENCY MEDICINE

## 2021-05-04 PROCEDURE — 93005 ELECTROCARDIOGRAM TRACING: CPT | Performed by: EMERGENCY MEDICINE

## 2021-05-04 PROCEDURE — 85025 COMPLETE CBC W/AUTO DIFF WBC: CPT

## 2021-05-04 PROCEDURE — 80053 COMPREHEN METABOLIC PANEL: CPT

## 2021-05-04 PROCEDURE — 99284 EMERGENCY DEPT VISIT MOD MDM: CPT

## 2021-05-04 PROCEDURE — 85379 FIBRIN DEGRADATION QUANT: CPT

## 2021-05-04 PROCEDURE — 84145 PROCALCITONIN (PCT): CPT

## 2021-05-04 PROCEDURE — 83880 ASSAY OF NATRIURETIC PEPTIDE: CPT

## 2021-05-04 PROCEDURE — 96367 TX/PROPH/DG ADDL SEQ IV INF: CPT

## 2021-05-04 PROCEDURE — 87040 BLOOD CULTURE FOR BACTERIA: CPT

## 2021-05-04 PROCEDURE — 96361 HYDRATE IV INFUSION ADD-ON: CPT

## 2021-05-04 PROCEDURE — 83605 ASSAY OF LACTIC ACID: CPT

## 2021-05-04 PROCEDURE — 1200000000 HC SEMI PRIVATE

## 2021-05-04 RX ORDER — 0.9 % SODIUM CHLORIDE 0.9 %
500 INTRAVENOUS SOLUTION INTRAVENOUS ONCE
Status: COMPLETED | OUTPATIENT
Start: 2021-05-04 | End: 2021-05-05

## 2021-05-04 RX ADMIN — CEFEPIME 2000 MG: 2 INJECTION, POWDER, FOR SOLUTION INTRAVENOUS at 20:49

## 2021-05-04 RX ADMIN — SODIUM CHLORIDE 500 ML: 9 INJECTION, SOLUTION INTRAVENOUS at 20:49

## 2021-05-04 RX ADMIN — VANCOMYCIN HYDROCHLORIDE 750 MG: 750 INJECTION, POWDER, LYOPHILIZED, FOR SOLUTION INTRAVENOUS at 21:26

## 2021-05-04 NOTE — ED NOTES
Pt brought in by germania from [facility] for increases SOb and cough     Jo Ann Bowman, JOSE EDUARDO  05/04/21 7895

## 2021-05-04 NOTE — ED PROVIDER NOTES
[address] ED    CHIEF COMPLAINT  Shortness of Breath       HISTORY OF PRESENT ILLNESS  Christiano Motta is a 78 y.o. female who presents to the ED with complaint of SOB. The patient is a limited historian on account of history of dementia. She presents from her nursing facility with concern for shortness of breath. No baseline oxygen requirement. Now requiring 2 L nasal cannula. Patient complains of intermittent cough. Denies chest pain, nausea, vomiting, diarrhea, abdominal pain. No other complaints, modifying factors or associated symptoms. I have reviewed the following from the nursing documentation:    Past Medical History:   Diagnosis Date    C. difficile diarrhea     COVID-19 12/01/2020    Depression     Dysphagia     ETOH abuse     May not have had a drink in a day or so.  Failure to thrive     Gastric ulcer     GERD (gastroesophageal reflux disease)     Thyroid disease      Past Surgical History:   Procedure Laterality Date    TUBAL LIGATION       Family History   Problem Relation Age of Onset    Diabetes Mother     Heart Disease Father      Social History     Socioeconomic History    Marital status:      Spouse name: Not on file    Number of children: Not on file    Years of education: Not on file    Highest education level: Not on file   Occupational History    Not on file   Social Needs    Financial resource strain: Not on file    Food insecurity     Worry: Not on file     Inability: Not on file    Transportation needs     Medical: Not on file     Non-medical: Not on file   Tobacco Use    Smoking status: Current Every Day Smoker     Packs/day: 1.00     Years: 45.00     Pack years: 45.00     Types: Cigarettes    Smokeless tobacco: Never Used   Substance and Sexual Activity    Alcohol use:  Yes     Alcohol/week: 6.0 standard drinks     Types: 6 Shots of liquor per week    Drug use: No    Sexual activity: Not on file     Comment: drinks 2-3 short 0.0 - 0.2 K/uL   Troponin   Result Value Ref Range    Troponin <0.01 <0.01 ng/mL   Brain Natriuretic Peptide   Result Value Ref Range    Pro- (H) 0 - 124 pg/mL   Lactic Acid, Plasma   Result Value Ref Range    Lactic Acid 1.2 0.4 - 2.0 mmol/L   Procalcitonin   Result Value Ref Range    Procalcitonin 0.26 (H) 0.00 - 0.15 ng/mL   D-dimer, quantitative   Result Value Ref Range    D-Dimer, Quant 332 (H) 0 - 229 ng/mL DDU   Blood gas, venous   Result Value Ref Range    pH, Chance 7.378 7.350 - 7.450    pCO2, Chance 60.0 (H) 40.0 - 50.0 mmHg    pO2, Chance 46.5 (H) 25 - 40 mmHg    HCO3, Venous 34.5 (H) 23.0 - 29.0 mmol/L    Base Excess, Chance 7.5 (H) -3.0 - 3.0 mmol/L    O2 Sat, Chance 80 Not Established %    Carboxyhemoglobin 1.2 0.0 - 1.5 %    MetHgb, Chance 0.1 <1.5 %    TC02 (Calc), Chance 36 Not Established mmol/L    O2 Content, Chance 15 Not Established VOL %    O2 Therapy Unknown    EKG 12 Lead   Result Value Ref Range    Ventricular Rate 112 BPM    Atrial Rate 112 BPM    P-R Interval 120 ms    QRS Duration 76 ms    Q-T Interval 322 ms    QTc Calculation (Bazett) 439 ms    P Axis 74 degrees    R Axis 89 degrees    T Axis -70 degrees    Diagnosis       Sinus tachycardiaRight atrial enlargementST & T wave abnormality, consider inferior ischemiaST & T wave abnormality, consider anterolateral ischemiaAbnormal ECGNo previous ECGs available       RADIOLOGY  I have reviewed all radiographic studies for this visit. XR CHEST (2 VW)   Final Result   COPD with probable mild pulmonary interstitial edema      Hazy right basilar opacity which could be due to pulmonary edema and/or   associated pneumonia. Recommend follow-up. Tiny pleural effusions along the lung bases. Multiple chronic displaced fractures involving the left humerus which are   unchanged. ECG  EKG interpreted by myself.    Rate: 112  Rhythm: sinus tachycardia   Axis: 89  Intervals: within normal limits  ST/T segments: diffuse abnormalities in setting of baseline tremor  Comparison: Compared to 1/1/21, rhythm is sinus tachycardia from normal sinus with sinus arrhythmia  Impression: Sinus tachycardia with right atrial enlargement, nonspecific ST/T wave abnormalities in the inferior and anterolateral leads       ED COURSE/MDM  Patient seen and evaluated. Old records reviewed. Labs and imaging reviewed and results discussed with patient/family to extent possible. 73-year-old female presents with shortness of breath. Is requiring 3 L nasal cannula to maintain adequate oxygenation. She is tachycardic, tachypneic, and slightly hypertensive with otherwise reassuring vital signs. Her pulmonary exam is notable for crackles at the bilateral bases. Cultures were obtained. Patient was administered vancomycin and cefepime for concern for sepsis of pulmonary etiology. Initial lactate within normal limits, no indication for 30 cc/kg fluid bolus. Chest x-ray shows findings consistent with COPD with probable mild pulmonary interstitial edema. Hazy right basilar opacity, pulmonary edema and/or associated pneumonia. Tiny pleural effusions at the lung bases. Multiple chronic displaced fractures involving the left humerus, unchanged. This is a known issue for the patient. Renal panel shows hyponatremia and hypochloremia. Preserved renal function. Gentle IV fluids administered as above. BNP is slightly elevated at 368. Procalcitonin 0.26. CBC no leukocytosis or anemia. D-dimer only slightly elevated at 332. His symptoms are better explained by pulmonary edema versus pneumonia will defer CTPA at this juncture. VBG shows chronically elevated PCO2, pH within normal limits. Rapid Covid negative. Admit to hospital medicine.     During the patient's ED course, the patient was given:  Medications   vancomycin (VANCOCIN) 750 mg in dextrose 5 % 250 mL IVPB (750 mg Intravenous New Bag 5/4/21 2126)   cefepime (MAXIPIME) 2000 mg IVPB minibag (0 mg Intravenous Stopped 5/4/21

## 2021-05-05 PROBLEM — J44.1 COPD EXACERBATION (HCC): Status: ACTIVE | Noted: 2021-05-05

## 2021-05-05 PROBLEM — E87.1 HYPONATREMIA: Status: ACTIVE | Noted: 2021-05-05

## 2021-05-05 LAB
EKG ATRIAL RATE: 112 BPM
EKG DIAGNOSIS: NORMAL
EKG P AXIS: 74 DEGREES
EKG P-R INTERVAL: 120 MS
EKG Q-T INTERVAL: 322 MS
EKG QRS DURATION: 76 MS
EKG QTC CALCULATION (BAZETT): 439 MS
EKG R AXIS: 89 DEGREES
EKG T AXIS: -70 DEGREES
EKG VENTRICULAR RATE: 112 BPM

## 2021-05-05 PROCEDURE — 94761 N-INVAS EAR/PLS OXIMETRY MLT: CPT

## 2021-05-05 PROCEDURE — 2700000000 HC OXYGEN THERAPY PER DAY

## 2021-05-05 PROCEDURE — 2580000003 HC RX 258: Performed by: HOSPITALIST

## 2021-05-05 PROCEDURE — 93010 ELECTROCARDIOGRAM REPORT: CPT | Performed by: INTERNAL MEDICINE

## 2021-05-05 PROCEDURE — 94640 AIRWAY INHALATION TREATMENT: CPT

## 2021-05-05 PROCEDURE — 6370000000 HC RX 637 (ALT 250 FOR IP): Performed by: PHYSICIAN ASSISTANT

## 2021-05-05 PROCEDURE — 1200000000 HC SEMI PRIVATE

## 2021-05-05 PROCEDURE — 99223 1ST HOSP IP/OBS HIGH 75: CPT | Performed by: INTERNAL MEDICINE

## 2021-05-05 PROCEDURE — 6360000002 HC RX W HCPCS: Performed by: HOSPITALIST

## 2021-05-05 PROCEDURE — 6360000002 HC RX W HCPCS: Performed by: PHYSICIAN ASSISTANT

## 2021-05-05 PROCEDURE — 6370000000 HC RX 637 (ALT 250 FOR IP): Performed by: HOSPITALIST

## 2021-05-05 PROCEDURE — 6370000000 HC RX 637 (ALT 250 FOR IP): Performed by: INTERNAL MEDICINE

## 2021-05-05 RX ORDER — PROMETHAZINE HYDROCHLORIDE 25 MG/1
12.5 TABLET ORAL EVERY 6 HOURS PRN
Status: DISCONTINUED | OUTPATIENT
Start: 2021-05-05 | End: 2021-05-07 | Stop reason: HOSPADM

## 2021-05-05 RX ORDER — IPRATROPIUM BROMIDE AND ALBUTEROL SULFATE 2.5; .5 MG/3ML; MG/3ML
1 SOLUTION RESPIRATORY (INHALATION)
Status: DISCONTINUED | OUTPATIENT
Start: 2021-05-05 | End: 2021-05-05

## 2021-05-05 RX ORDER — SODIUM CHLORIDE 9 MG/ML
25 INJECTION, SOLUTION INTRAVENOUS PRN
Status: DISCONTINUED | OUTPATIENT
Start: 2021-05-05 | End: 2021-05-07 | Stop reason: HOSPADM

## 2021-05-05 RX ORDER — IPRATROPIUM BROMIDE AND ALBUTEROL SULFATE 2.5; .5 MG/3ML; MG/3ML
1 SOLUTION RESPIRATORY (INHALATION) EVERY 4 HOURS
Status: DISCONTINUED | OUTPATIENT
Start: 2021-05-05 | End: 2021-05-07 | Stop reason: HOSPADM

## 2021-05-05 RX ORDER — SODIUM CHLORIDE 0.9 % (FLUSH) 0.9 %
5-40 SYRINGE (ML) INJECTION PRN
Status: DISCONTINUED | OUTPATIENT
Start: 2021-05-05 | End: 2021-05-07 | Stop reason: HOSPADM

## 2021-05-05 RX ORDER — ACETAMINOPHEN 325 MG/1
650 TABLET ORAL EVERY 6 HOURS PRN
Status: DISCONTINUED | OUTPATIENT
Start: 2021-05-05 | End: 2021-05-07 | Stop reason: HOSPADM

## 2021-05-05 RX ORDER — PANTOPRAZOLE SODIUM 40 MG/1
40 TABLET, DELAYED RELEASE ORAL
Status: DISCONTINUED | OUTPATIENT
Start: 2021-05-05 | End: 2021-05-07 | Stop reason: HOSPADM

## 2021-05-05 RX ORDER — QUETIAPINE FUMARATE 25 MG/1
12.5 TABLET, FILM COATED ORAL 2 TIMES DAILY
Status: DISCONTINUED | OUTPATIENT
Start: 2021-05-05 | End: 2021-05-07 | Stop reason: HOSPADM

## 2021-05-05 RX ORDER — ACETAMINOPHEN 650 MG/1
650 SUPPOSITORY RECTAL EVERY 6 HOURS PRN
Status: DISCONTINUED | OUTPATIENT
Start: 2021-05-05 | End: 2021-05-07 | Stop reason: HOSPADM

## 2021-05-05 RX ORDER — METHYLPREDNISOLONE SODIUM SUCCINATE 40 MG/ML
40 INJECTION, POWDER, LYOPHILIZED, FOR SOLUTION INTRAMUSCULAR; INTRAVENOUS DAILY
Status: DISCONTINUED | OUTPATIENT
Start: 2021-05-05 | End: 2021-05-07

## 2021-05-05 RX ORDER — ONDANSETRON 2 MG/ML
4 INJECTION INTRAMUSCULAR; INTRAVENOUS EVERY 6 HOURS PRN
Status: DISCONTINUED | OUTPATIENT
Start: 2021-05-05 | End: 2021-05-07 | Stop reason: HOSPADM

## 2021-05-05 RX ORDER — POLYETHYLENE GLYCOL 3350 17 G/17G
17 POWDER, FOR SOLUTION ORAL DAILY PRN
Status: DISCONTINUED | OUTPATIENT
Start: 2021-05-05 | End: 2021-05-07 | Stop reason: HOSPADM

## 2021-05-05 RX ORDER — SODIUM CHLORIDE 9 MG/ML
INJECTION, SOLUTION INTRAVENOUS CONTINUOUS
Status: DISCONTINUED | OUTPATIENT
Start: 2021-05-05 | End: 2021-05-07

## 2021-05-05 RX ORDER — SODIUM CHLORIDE 0.9 % (FLUSH) 0.9 %
5-40 SYRINGE (ML) INJECTION EVERY 12 HOURS SCHEDULED
Status: DISCONTINUED | OUTPATIENT
Start: 2021-05-05 | End: 2021-05-07 | Stop reason: HOSPADM

## 2021-05-05 RX ADMIN — SODIUM CHLORIDE, PRESERVATIVE FREE 10 ML: 5 INJECTION INTRAVENOUS at 20:32

## 2021-05-05 RX ADMIN — QUETIAPINE FUMARATE 12.5 MG: 25 TABLET ORAL at 20:31

## 2021-05-05 RX ADMIN — QUETIAPINE FUMARATE 12.5 MG: 25 TABLET ORAL at 10:37

## 2021-05-05 RX ADMIN — IPRATROPIUM BROMIDE AND ALBUTEROL SULFATE 1 AMPULE: 2.5; .5 SOLUTION RESPIRATORY (INHALATION) at 19:30

## 2021-05-05 RX ADMIN — ENOXAPARIN SODIUM 40 MG: 40 INJECTION SUBCUTANEOUS at 10:37

## 2021-05-05 RX ADMIN — SODIUM CHLORIDE: 9 INJECTION, SOLUTION INTRAVENOUS at 14:12

## 2021-05-05 RX ADMIN — VANCOMYCIN HYDROCHLORIDE 500 MG: 500 INJECTION, POWDER, LYOPHILIZED, FOR SOLUTION INTRAVENOUS at 21:11

## 2021-05-05 RX ADMIN — IPRATROPIUM BROMIDE AND ALBUTEROL SULFATE 1 AMPULE: 2.5; .5 SOLUTION RESPIRATORY (INHALATION) at 15:24

## 2021-05-05 RX ADMIN — CEFEPIME 2000 MG: 2 INJECTION, POWDER, FOR SOLUTION INTRAVENOUS at 20:30

## 2021-05-05 RX ADMIN — PANTOPRAZOLE SODIUM 40 MG: 40 TABLET, DELAYED RELEASE ORAL at 14:15

## 2021-05-05 RX ADMIN — METHYLPREDNISOLONE SODIUM SUCCINATE 40 MG: 40 INJECTION, POWDER, FOR SOLUTION INTRAMUSCULAR; INTRAVENOUS at 14:15

## 2021-05-05 RX ADMIN — CEFEPIME 2000 MG: 2 INJECTION, POWDER, FOR SOLUTION INTRAVENOUS at 10:36

## 2021-05-05 RX ADMIN — IPRATROPIUM BROMIDE AND ALBUTEROL SULFATE 1 AMPULE: 2.5; .5 SOLUTION RESPIRATORY (INHALATION) at 23:26

## 2021-05-05 RX ADMIN — SODIUM CHLORIDE, PRESERVATIVE FREE 10 ML: 5 INJECTION INTRAVENOUS at 10:37

## 2021-05-05 NOTE — PROGRESS NOTES
Patient is not able to demonstrate the ability to move from a reclining position to an upright position within the recliner due to overall weakness. Bedside Mobility Assessment Tool (BMAT):     Assessment Level 1- Sit and Shake    1. From a semi-reclined position, ask patient to sit up and rotate to a seated position at the side of the bed. Can use the bedrail. 2. Ask patient to reach out and grab your hand and shake making sure patient reaches across his/her midline. Fail- Patient is unable to perform tasks, patient is MOBILITY LEVEL 1. Assessment Level 2- Stretch and Point   1. With patient in seated position at the side of the bed, have patient place both feet on the floor (or stool) with knees no higher than hips. 2. Ask patient to stretch one leg and straighten the knee, then bend the ankle/flex and point the toes. If appropriate, repeat with the other leg. Fail- Patient is unable to complete task. Patient is MOBILITY LEVEL 2. Assessment Level 3- Stand   1. Ask patient to elevate off the bed or chair (seated to standing) using an assistive device (cane, bedrail). 2. Patient should be able to raise buttocks off be and hold for a count of five. May repeat once. Fail- Patient unable to demonstrate standing stability. Patient is MOBILITY LEVEL 3. Assessment Level 4- Walk   1. Ask patient to march in place at bedside. 2. Then ask patient to advance step and return each foot. Some medical conditions may render a patient from stepping backwards, use your best clinical judgement. Fail- Patient not able to complete tasks OR requires use of assistive device. Patient is MOBILITY LEVEL 3.        Mobility Level- 3

## 2021-05-05 NOTE — PROGRESS NOTES
RESPIRATORY THERAPY ASSESSMENT    Name:  Danna Liu  Medical Record Number:  [id number]  Age: 78 y.o. Gender: female  : 1950  Today's Date:  2021  Room:  [facility]201-01    Assessment     Is the patient being admitted for a COPD or Asthma exacerbation? No   (If yes the patient will be seen every 4 hours for the first 24 hours and then reassessed)    Patient Admission Diagnosis      Allergies  Allergies   Allergen Reactions    No Known Allergies        Minimum Predicted Vital Capacity:     NA          Actual Vital Capacity:      N  A            Pulmonary History:COPD  Home Oxygen Therapy:  room air  Home Respiratory Therapy:Albuterol   Current Respiratory Therapy:  DUONEB Q4WA  Treatment Type: HHN  Medications: Albuterol/Ipratropium    Respiratory Severity Index(RSI)   Patients with orders for inhalation medications, oxygen, or any therapeutic treatment modality will be placed on Respiratory Protocol. They will be assessed with the first treatment and at least every 72 hours thereafter. The following severity scale will be used to determine frequency of treatment intervention. Smoking History: Pulmonary Disease or Smoking History, Greater than 15 pack year = 2    Social History  Social History     Tobacco Use    Smoking status: Current Every Day Smoker     Packs/day: 1.00     Years: 45.00     Pack years: 45.00     Types: Cigarettes    Smokeless tobacco: Never Used   Substance Use Topics    Alcohol use:  Yes     Alcohol/week: 6.0 standard drinks     Types: 6 Shots of liquor per week    Drug use: No       Recent Surgical History: None = 0  Past Surgical History  Past Surgical History:   Procedure Laterality Date    TUBAL LIGATION         Level of Consciousness: Alert, Oriented, and Cooperative = 0    Level of Activity: Mostly sedentary, minimal walking = 2    Respiratory Pattern: Use of accessory muscles;prolonged expiration; or RR greater than 30 = 3    Breath Sounds: Absent bilaterally and/or with wheezes = 3    Sputum   ,  ,    Cough: Weak, productive = 2    Vital Signs   BP 95/61   Pulse 102   Temp 97.2 °F (36.2 °C) (Oral)   Resp 22   Ht 5' 2\" (1.575 m)   Wt 62 lb (28.1 kg)   SpO2 91%   BMI 11.34 kg/m²   SPO2 (COPD values may differ): 86-87% on room air or greater than 92% on FiO2 35- 50% = 3    Peak Flow (asthma only): not applicable = 0    RSI: 00-17 = Q4WA (every four hours while awake) and Q4hrs PRN        Plan       Goals: medication delivery, mobilize retained secretions, volume expansion and improve oxygenation    Patient/caregiver was educated on the proper method of use for Respiratory Care Devices:  Yes      Level of patient/caregiver understanding able to:   ? Verbalize understanding   ? Demonstrate understanding       ? Teach back        ? Needs reinforcement       ? No available caregiver               ? Other:     Response to education:  Excellent     Is patient being placed on Home Treatment Regimen? No     Does the patient have everything they need prior to discharge? NA     Comments: pna, copd    Plan of Care: q4h based on pt condition    Electronically signed by Teddy Steiner RCP on 5/5/2021 at 3:28 PM    Respiratory Protocol Guidelines     1. Assessment and treatment by Respiratory Therapy will be initiated for medication and therapeutic interventions upon initiation of aerosolized medication. 2. Physician will be contacted for respiratory rate (RR) greater than 35 breaths per minute. Therapy will be held for heart rate (HR) greater than 140 beats per minute, pending direction from physician. 3. Bronchodilators will be administered via Metered Dose Inhaler (MDI) with spacer when the following criteria are met:  a. Alert and cooperative     b. HR < 140 bpm  c. RR < 30 bpm                d. Can demonstrate a 2-3 second inspiratory hold  4.  Bronchodilators will be administered via Hand Held Nebulizer [facility]) to patients when ANY of the following criteria are met  a. Incognizant or uncooperative          b. Patients treated with HHN at Home        c. Unable to demonstrate proper use of MDI with spacer     d. RR > 30 bpm   5. Bronchodilators will be delivered via Metered Dose Inhaler (MDI), HHN, Aerogen to intubated patients on mechanical ventilation. 6. Inhalation medication orders will be delivered and/or substituted as outlined below. Aerosolized Medications Ordering and Administration Guidelines:    1. All Medications will be ordered by a physician, and their frequency and/or modality will be adjusted as defined by the patients Respiratory Severity Index (RSI) score. 2. If the patient does not have documented COPD, consider discontinuing anticholinergics when RSI is less than 9.  3. If the bronchospasm worsens (increased RSI), then the bronchodilator frequency can be increased to a maximum of every 4 hours. If greater than every 4 hours is required, the physician will be contacted. 4. If the bronchospasm improves, the frequency of the bronchodilator can be decreased, based on the patient's RSI, but not less than home treatment regimen frequency. 5. Bronchodilator(s) will be discontinued if patient has a RSI less than 9 and has received no scheduled or as needed treatment for 72  Hrs. Patients Ordered on a Mucolytic Agent:    1. Must always be administered with a bronchodilator. 2. Discontinue if patient experiences worsened bronchospasm, or secretions have lessened to the point that the patient is able to clear them with a cough. Anti-inflammatory and Combination Medications:    1. If the patient lacks prior history of lung disease, is not using inhaled anti-inflammatory medication at home, and lacks wheezing by examination or by history for at least 24 hours, contact physician for possible discontinuation.

## 2021-05-05 NOTE — H&P
Hospital Medicine History & Physical      PCP: No primary care provider on file. Date of Admission: 5/4/2021    Date of Service: Pt seen/examined on 5/5/2021     Chief Complaint:    Chief Complaint   Patient presents with    Shortness of Breath     History Of Present Illness: The patient is a 78 y.o. female with h/o COVID 19 requiring prolonged admission with mechanical ventilation in 12/2020, PCM, left humerus fx 2018- not repaired, former alcohol abuse who presented to [facility] ED with complaint of shortness of breath from nursing home. Per nursing home's report the patient does not have a baseline oxygen requirement but was requiring 2 L per nasal cannula, she was having an intermittent cough. Patient seen on admission and states that she feels fine, she denies having any complaints    In the ER she was afebrile. She was hypoxic on 3 L of nasal cannula oxygen 84%, she was increased to 5 L nasal cannula oxygen was stable O2 sats thereafter. She had no leukocytosis but a procalcitonin to 0. 26. X-ray of her chest showed COPD and a right basilar opacity concerning for pneumonia. She was started on broad-spectrum antibiotics for healthcare associated pneumonia, she will be admitted for further care. Past Medical History:        Diagnosis Date    C. difficile diarrhea     COVID-19 12/01/2020    Depression     Dysphagia     ETOH abuse     May not have had a drink in a day or so.  Failure to thrive     Gastric ulcer     GERD (gastroesophageal reflux disease)     Thyroid disease        Past Surgical History:        Procedure Laterality Date    TUBAL LIGATION         Medications Prior to Admission:    Prior to Admission medications    Medication Sig Start Date End Date Taking?  Authorizing Provider   QUEtiapine (SEROQUEL) 25 MG tablet Take 0.5 tablets by mouth 2 times daily 12/22/20   Judie Lopez MD   dexamethasone (DECADRON) 0.5 MG tablet 2 qd- 3 days, 1 qd- 4 days 12/22/20 Sergey Humphrey MD       Allergies:  No known allergies    Social History:  The patient currently lives at nursing home    TOBACCO:   reports that she has been smoking cigarettes. She has a 45.00 pack-year smoking history. She has never used smokeless tobacco.  ETOH:   reports current alcohol use of about 6.0 standard drinks of alcohol per week. Family History:   Positive as follows:        Problem Relation Age of Onset    Diabetes Mother     Heart Disease Father        REVIEW OF SYSTEMS:     Patient is not a reliable historian, ROS could not be completed accurately, she denies any complaints    PHYSICAL EXAM:    BP (!) 112/47   Pulse 72   Temp 98.3 °F (36.8 °C) (Oral)   Resp 20   Ht 5' 2\" (1.575 m)   Wt 62 lb (28.1 kg)   SpO2 100%   BMI 11.34 kg/m²     Gen: Cachectic female who appears short of breath and also chronically ill. Not ib distress. Alert. Eyes: PERRL. No sclera icterus. No conjunctival injection. ENT: No discharge. Pharynx clear. Neck: No JVD. Trachea midline. Resp: No accessory muscle use. No crackles. + scattered wheezes. No rhonchi. Diminished air entry throughout   CV: Regular rate. Regular rhythm. No murmur. No rub. No edema. GI: Non-tender. Non-distended. No masses. No organomegaly. Normal bowel sounds. No hernia. Skin: Warm and dry. No nodule on exposed extremities. No rash on exposed extremities. M/S: No cyanosis. No joint deformity. No clubbing. Severe muscle wasting noted in BUE and BLE   Neuro: Awake. Grossly nonfocal    Psych: Oriented to self only. No anxiety or agitation.      CBC:   Recent Labs     05/04/21 1900   WBC 5.5   HGB 12.3   HCT 37.5   MCV 81.5        BMP:   Recent Labs     05/04/21 1900   *   K 4.3   CL 84*   CO2 35*   BUN 12   CREATININE <0.5*     LIVER PROFILE:   Recent Labs     05/04/21 1900   AST 32   ALT 30   BILITOT 0.4   ALKPHOS 115       CARDIAC ENZYMES  Recent Labs     05/04/21 1900   TROPONINI <0.01 CULTURES  Rapid COVID 19: Not detected  Blood: pending     EKG:  I have reviewed the EKG with the following interpretation:   Sinus tachycardia with ventricular rate 112 bpm   Inferior ST and T wave abnormality concerning for ischemia     RADIOLOGY  XR CHEST (2 VW)   Final Result   COPD with probable mild pulmonary interstitial edema      Hazy right basilar opacity which could be due to pulmonary edema and/or   associated pneumonia. Recommend follow-up. Tiny pleural effusions along the lung bases. Multiple chronic displaced fractures involving the left humerus which are   unchanged. Sim Stern have reviewed the chart on [address] and personally interviewed and examined patient, reviewed the data (labs and imaging) and after discussion with my PA formulated the plan. Agree with note with the following edits. HPI:     78year old white female who is a poor historian. She had COVID 19 and had a prolonged hospital stay in 12/2020. She has history of alcohol abuse. She is staying in a NH at present. She was sent in for dyspnea. She is not on home oxygen. She was hypoxic in the ER. She is on 3 L of oxygen at present. I reviewed the patient's Past Medical History, Past Surgical History, Medications, and Allergies. Physical exam:    BP (!) 112/47   Pulse 72   Temp 98.3 °F (36.8 °C) (Oral)   Resp 20   Ht 5' 2\" (1.575 m)   Wt 62 lb (28.1 kg)   SpO2 100%   BMI 11.34 kg/m²     Gen: Mild distress. Alert. Cachetic. Eyes: PERRL. No sclera icterus. No conjunctival injection. ENT: No discharge. Pharynx clear. Neck: Trachea midline. Normal thyroid. Resp: Mild accessory muscle use. No crackles. Mild wheezes. No rhonchi. No dullness on percussion. CV: Regular rate. Regular rhythm. No murmur or rub. No edema.          ASSESSMENT/PLAN:    #Acute hypoxic respiratory failure  -Suspect secondary to pneumonia, COPD  -She does not have a baseline oxygen requirement. She is requiring 5 L of nasal cannula oxygen on admission. She was noted to be hypoxic with 3 L supplemental oxygen 84% in the ER. She is using accessory muscles to breathe on admission   -wean O2 as able     #Pneumonia - right basilar   - Health care acquired: suspect a gram negative organism, also risk for MRSA  - Treat with antibiotics vanc and cefepime D#1  - check cultures     #COPD with exacerbation   - solumedrol 40 mg daily  - [facility] clair    #Hyponatremia  -This appears to be chronically low  -Sodium was 133 in January 2021  -Sodium 128 on admission  - IV NS and monitor BMP daily     #Abnormal EKG  - with inferior ST and T wave changes. Trop neg and she denies CP.   Will repeat EKG in AM, cont on tele monitoring for now     #Severe protein calorie malnutrition  - dietitian cs     #Remote left humerus fracture- 2018  -  never repaired with chronic deformity noted on imaging     #History of COVID-19 pneumonia in December 2020 requiring mechanical ventilation and subsequent discharge to a nursing home    #History of peptic ulcer disease  - Gastric ulcer was noted on EGD in 2013  - start PPI for ppx during admit now     #Reported history of alcohol abuse  - Now lives in nursing facility    DVT Prophylaxis: Lovenox   Diet: DIET GENERAL;  Code Status: Full Code    Tiago Rosales PA-C  5/5/2021 11:43 AM      BERRY BARRETT 5/5/2021 2:00 PM

## 2021-05-05 NOTE — PROGRESS NOTES
Pt pulled IV out and pure wick. Replaced IV and pure wick. tele sitter placed in room, bed alarm on.  Call light within reach,

## 2021-05-05 NOTE — CONSULTS
Pharmacy to dose IV Vanco:  Please give 750mg IV x1 in ED to provide Gram+ organism coverage to include MRSA.     Jenni Rand PharmD 5/4/2021 8:05 PM

## 2021-05-05 NOTE — PROGRESS NOTES
Received from ER via bed. Resp. Even and unlabored. o2 sat on 3L NC is 94%. Denies pain or discomfort at this time. Call light within reach. Bed alarm on.

## 2021-05-06 PROBLEM — E43 SEVERE PROTEIN-CALORIE MALNUTRITION (GOMEZ: LESS THAN 60% OF STANDARD WEIGHT) (HCC): Chronic | Status: ACTIVE | Noted: 2020-12-02

## 2021-05-06 LAB
ANION GAP SERPL CALCULATED.3IONS-SCNC: 6 MMOL/L (ref 3–16)
BUN BLDV-MCNC: 8 MG/DL (ref 7–20)
CALCIUM SERPL-MCNC: 8.9 MG/DL (ref 8.3–10.6)
CHLORIDE BLD-SCNC: 97 MMOL/L (ref 99–110)
CO2: 32 MMOL/L (ref 21–32)
CREAT SERPL-MCNC: <0.5 MG/DL (ref 0.6–1.2)
EKG ATRIAL RATE: 69 BPM
EKG DIAGNOSIS: NORMAL
EKG P AXIS: 80 DEGREES
EKG P-R INTERVAL: 118 MS
EKG Q-T INTERVAL: 382 MS
EKG QRS DURATION: 86 MS
EKG QTC CALCULATION (BAZETT): 409 MS
EKG R AXIS: 84 DEGREES
EKG T AXIS: 83 DEGREES
EKG VENTRICULAR RATE: 69 BPM
GFR AFRICAN AMERICAN: >60
GFR NON-AFRICAN AMERICAN: >60
GLUCOSE BLD-MCNC: 109 MG/DL (ref 70–99)
HCT VFR BLD CALC: 32.7 % (ref 36–48)
HEMOGLOBIN: 10.6 G/DL (ref 12–16)
MCH RBC QN AUTO: 27 PG (ref 26–34)
MCHC RBC AUTO-ENTMCNC: 32.5 G/DL (ref 31–36)
MCV RBC AUTO: 83 FL (ref 80–100)
PDW BLD-RTO: 13.9 % (ref 12.4–15.4)
PLATELET # BLD: 199 K/UL (ref 135–450)
PMV BLD AUTO: 8 FL (ref 5–10.5)
POTASSIUM REFLEX MAGNESIUM: 4.4 MMOL/L (ref 3.5–5.1)
RBC # BLD: 3.94 M/UL (ref 4–5.2)
SODIUM BLD-SCNC: 135 MMOL/L (ref 136–145)
VANCOMYCIN TROUGH: <4 UG/ML (ref 10–20)
WBC # BLD: 2.7 K/UL (ref 4–11)

## 2021-05-06 PROCEDURE — 2700000000 HC OXYGEN THERAPY PER DAY

## 2021-05-06 PROCEDURE — 6370000000 HC RX 637 (ALT 250 FOR IP): Performed by: PHYSICIAN ASSISTANT

## 2021-05-06 PROCEDURE — 80202 ASSAY OF VANCOMYCIN: CPT

## 2021-05-06 PROCEDURE — 6370000000 HC RX 637 (ALT 250 FOR IP): Performed by: INTERNAL MEDICINE

## 2021-05-06 PROCEDURE — 99232 SBSQ HOSP IP/OBS MODERATE 35: CPT | Performed by: INTERNAL MEDICINE

## 2021-05-06 PROCEDURE — 6370000000 HC RX 637 (ALT 250 FOR IP): Performed by: HOSPITALIST

## 2021-05-06 PROCEDURE — 1200000000 HC SEMI PRIVATE

## 2021-05-06 PROCEDURE — 2580000003 HC RX 258: Performed by: HOSPITALIST

## 2021-05-06 PROCEDURE — 93005 ELECTROCARDIOGRAM TRACING: CPT | Performed by: PHYSICIAN ASSISTANT

## 2021-05-06 PROCEDURE — 36415 COLL VENOUS BLD VENIPUNCTURE: CPT

## 2021-05-06 PROCEDURE — 6360000002 HC RX W HCPCS: Performed by: PHYSICIAN ASSISTANT

## 2021-05-06 PROCEDURE — 93010 ELECTROCARDIOGRAM REPORT: CPT | Performed by: INTERNAL MEDICINE

## 2021-05-06 PROCEDURE — 6360000002 HC RX W HCPCS: Performed by: HOSPITALIST

## 2021-05-06 PROCEDURE — 94761 N-INVAS EAR/PLS OXIMETRY MLT: CPT

## 2021-05-06 PROCEDURE — 80048 BASIC METABOLIC PNL TOTAL CA: CPT

## 2021-05-06 PROCEDURE — 6370000000 HC RX 637 (ALT 250 FOR IP)

## 2021-05-06 PROCEDURE — 94640 AIRWAY INHALATION TREATMENT: CPT

## 2021-05-06 PROCEDURE — 85027 COMPLETE CBC AUTOMATED: CPT

## 2021-05-06 RX ORDER — DIMETHICONE, OXYBENZONE, AND PADIMATE O 2; 2.5; 6.6 G/100G; G/100G; G/100G
STICK TOPICAL
Status: COMPLETED
Start: 2021-05-06 | End: 2021-05-06

## 2021-05-06 RX ORDER — DIMETHICONE, OXYBENZONE, AND PADIMATE O 2; 2.5; 6.6 G/100G; G/100G; G/100G
STICK TOPICAL PRN
Status: DISCONTINUED | OUTPATIENT
Start: 2021-05-06 | End: 2021-05-07 | Stop reason: HOSPADM

## 2021-05-06 RX ADMIN — DIMETHICONE, OXYBENZONE, AND PADIMATE O: 2; 2.5; 6.6 STICK TOPICAL at 11:49

## 2021-05-06 RX ADMIN — CEFEPIME 2000 MG: 2 INJECTION, POWDER, FOR SOLUTION INTRAVENOUS at 19:48

## 2021-05-06 RX ADMIN — SODIUM CHLORIDE: 9 INJECTION, SOLUTION INTRAVENOUS at 11:53

## 2021-05-06 RX ADMIN — IPRATROPIUM BROMIDE AND ALBUTEROL SULFATE 1 AMPULE: 2.5; .5 SOLUTION RESPIRATORY (INHALATION) at 22:45

## 2021-05-06 RX ADMIN — QUETIAPINE FUMARATE 12.5 MG: 25 TABLET ORAL at 07:45

## 2021-05-06 RX ADMIN — Medication: at 11:49

## 2021-05-06 RX ADMIN — CEFEPIME 2000 MG: 2 INJECTION, POWDER, FOR SOLUTION INTRAVENOUS at 07:45

## 2021-05-06 RX ADMIN — VANCOMYCIN HYDROCHLORIDE 500 MG: 500 INJECTION, POWDER, LYOPHILIZED, FOR SOLUTION INTRAVENOUS at 21:05

## 2021-05-06 RX ADMIN — SODIUM CHLORIDE, PRESERVATIVE FREE 10 ML: 5 INJECTION INTRAVENOUS at 08:42

## 2021-05-06 RX ADMIN — ENOXAPARIN SODIUM 40 MG: 40 INJECTION SUBCUTANEOUS at 07:46

## 2021-05-06 RX ADMIN — IPRATROPIUM BROMIDE AND ALBUTEROL SULFATE 1 AMPULE: 2.5; .5 SOLUTION RESPIRATORY (INHALATION) at 06:40

## 2021-05-06 RX ADMIN — IPRATROPIUM BROMIDE AND ALBUTEROL SULFATE 1 AMPULE: 2.5; .5 SOLUTION RESPIRATORY (INHALATION) at 03:01

## 2021-05-06 RX ADMIN — IPRATROPIUM BROMIDE AND ALBUTEROL SULFATE 1 AMPULE: 2.5; .5 SOLUTION RESPIRATORY (INHALATION) at 10:56

## 2021-05-06 RX ADMIN — IPRATROPIUM BROMIDE AND ALBUTEROL SULFATE 1 AMPULE: 2.5; .5 SOLUTION RESPIRATORY (INHALATION) at 14:53

## 2021-05-06 RX ADMIN — QUETIAPINE FUMARATE 12.5 MG: 25 TABLET ORAL at 19:48

## 2021-05-06 RX ADMIN — METHYLPREDNISOLONE SODIUM SUCCINATE 40 MG: 40 INJECTION, POWDER, FOR SOLUTION INTRAMUSCULAR; INTRAVENOUS at 07:45

## 2021-05-06 RX ADMIN — PANTOPRAZOLE SODIUM 40 MG: 40 TABLET, DELAYED RELEASE ORAL at 05:49

## 2021-05-06 RX ADMIN — IPRATROPIUM BROMIDE AND ALBUTEROL SULFATE 1 AMPULE: 2.5; .5 SOLUTION RESPIRATORY (INHALATION) at 17:28

## 2021-05-06 ASSESSMENT — PAIN SCALES - GENERAL: PAINLEVEL_OUTOF10: 0

## 2021-05-06 NOTE — PROGRESS NOTES
Progress Note    Admit Date:  5/4/2021    78 y.o. female with h/o COVID 19 requiring prolonged admission with mechanical ventilation in 12/2020, PCM, left humerus fx 2018- not repaired, former alcohol abuse who presented to [facility] ED with complaint of shortness of breath from nursing home. She was hypoxic in the ED. Admitted for acute hypoxic respiratory failure, COPD, and pneumonia. Subjective:  Ms. Re Louis states she feels fine. On 3 L O2. She states she wears O2 occasionally. Objective:   Patient Vitals for the past 4 hrs:   BP Temp Temp src Pulse Resp SpO2   05/06/21 0745 115/61 97.2 °F (36.2 °C) Oral 89 16 100 %   05/06/21 0643 -- -- -- -- -- 100 %            Intake/Output Summary (Last 24 hours) at 5/6/2021 2618  Last data filed at 5/6/2021 0327  Gross per 24 hour   Intake --   Output 300 ml   Net -300 ml       Physical Exam:  Gen: Cachectic female who appears chronically ill. Not in distress. Alert. Eyes: PERRL. No sclera icterus. No conjunctival injection. ENT: No discharge. Pharynx clear. Neck: No JVD. Trachea midline. Resp: No accessory muscle use. No crackles. No wheezes. No rhonchi. Diminished air entry throughout   CV: Regular rate. Regular rhythm. No murmur. No rub. No edema. GI: Non-tender. Non-distended. No masses. No organomegaly. Normal bowel sounds. No hernia. Skin: Warm and dry. No nodule on exposed extremities. No rash on exposed extremities. M/S: No cyanosis. No joint deformity. No clubbing. Severe muscle wasting noted in BUE and BLE   Neuro: Awake. Grossly nonfocal    Psych: Oriented to self only. No anxiety or agitation.      Data:  CBC:   Recent Labs     05/04/21 1900   WBC 5.5   HGB 12.3   HCT 37.5   MCV 81.5        BMP:   Recent Labs     05/04/21 1900   *   K 4.3   CL 84*   CO2 35*   BUN 12   CREATININE <0.5*     LIVER PROFILE:   Recent Labs     05/04/21 1900   AST 32   ALT 30   BILITOT 0.4   ALKPHOS 115     PT/INR: No results for input(s): antibiotics vanc and cefepime D#2  - check cultures      #COPD with exacerbation   - solumedrol 40 mg daily  - [facility] clair     #Hyponatremia  -This appears to be chronically low  -Sodium was 133 in January 2021  -Sodium 128 on admission  - IV NS and monitor BMP daily     #Abnormal EKG  - with inferior ST and T wave changes.   Trop neg and she denies CP.  cont on tele monitoring for now   - repeat EKG with improved ST changes     #Severe protein calorie malnutrition  - dietitian cs      #Remote left humerus fracture- 2018  -  never repaired with chronic deformity noted on imaging      #History of COVID-19 pneumonia in December 2020 requiring mechanical ventilation and subsequent discharge to a nursing home     #History of peptic ulcer disease  - Gastric ulcer was noted on EGD in 2013  - start PPI for ppx during admit now      #Reported history of alcohol abuse  - Now lives in nursing facility     DVT Prophylaxis: Lovenox   Diet: DIET GENERAL;  Code Status: Full Code    Chalofavian Adriana REDDYP-C  5/6/2021      Lu December 5/6/2021 10:37 AM

## 2021-05-06 NOTE — PROGRESS NOTES
Shift assessment complete. See flow sheet. Due medications administered per MD orders. See MAR. Vital signs stable. Patient is alert and oriented x 4, periods of confusion. Patient educated to leave nasal cannula in her nose, patient has repeatedly removed her oxygen cannula this shift. Pt denies any present needs/concerns. Call light explained and in reach.

## 2021-05-06 NOTE — PROGRESS NOTES
Vancomycin Day: 3    Patient's labs, cultures, vitals, and vancomycin regimen reviewed. No changes today.   Trough due on 5/6 at 2030  Rozina Gayatri DESIRAE 5/6/202112:17 PM  .

## 2021-05-06 NOTE — PLAN OF CARE
Nutrition Problem #1: Severe malnutrition  Intervention: Food and/or Nutrient Delivery: Continue Current Diet, Start Oral Nutrition Supplement  Nutritional Goals: patient will accept and consume at least 50% of her meals on general diet order x 3 meals per day + she will accept and consume 50% or greater of Ensure Compact + Boost pudding with meals

## 2021-05-06 NOTE — PLAN OF CARE
Problem: Falls - Risk of:  Goal: Will remain free from falls  Description: Will remain free from falls  5/6/2021 0925 by Carissa Saleh RN  Outcome: Ongoing  5/5/2021 2035 by Noni Kirby RN  Outcome: Ongoing  Goal: Absence of physical injury  Description: Absence of physical injury  5/6/2021 0925 by Carissa Saleh RN  Outcome: Ongoing  5/5/2021 2035 by Noni Kirby RN  Outcome: Ongoing     Problem: Skin Integrity:  Goal: Will show no infection signs and symptoms  Description: Will show no infection signs and symptoms  5/6/2021 0925 by Carissa Saleh RN  Outcome: Ongoing  5/5/2021 2035 by Noni Kirby RN  Outcome: Ongoing  Goal: Absence of new skin breakdown  Description: Absence of new skin breakdown  5/6/2021 0925 by Carissa Saleh RN  Outcome: Ongoing  5/5/2021 2035 by Noni Kirby RN  Outcome: Ongoing

## 2021-05-06 NOTE — PLAN OF CARE
Problem: Falls - Risk of:  Goal: Will remain free from falls  Description: Will remain free from falls  5/6/2021 1955 by Alba Fleischer, RN  Outcome: Ongoing  5/6/2021 0925 by Jose Oppenheim, RN  Outcome: Ongoing  Goal: Absence of physical injury  Description: Absence of physical injury  5/6/2021 1955 by Alba Fleischer, RN  Outcome: Ongoing  5/6/2021 0925 by Jose Oppenheim, RN  Outcome: Ongoing     Problem: Skin Integrity:  Goal: Will show no infection signs and symptoms  Description: Will show no infection signs and symptoms  5/6/2021 1955 by Alba Fleischer, RN  Outcome: Ongoing  5/6/2021 0925 by Jose Oppenheim, RN  Outcome: Ongoing  Goal: Absence of new skin breakdown  Description: Absence of new skin breakdown  5/6/2021 1955 by Alba Fleischer, RN  Outcome: Ongoing  5/6/2021 0925 by Jose Oppenheim, RN  Outcome: Ongoing     Problem: Nutrition  Goal: Optimal nutrition therapy  Outcome: Ongoing  Goal: Understanding of nutritional guidelines  Outcome: Ongoing

## 2021-05-06 NOTE — PROGRESS NOTES
Pm assessment completed patient is laying in bed awake watching tv offers no c/o IV fluids infusing per order telesitter in place

## 2021-05-06 NOTE — PLAN OF CARE
Problem: Falls - Risk of:  Goal: Will remain free from falls  Description: Will remain free from falls  5/5/2021 2035 by Moi Humphreys RN  Outcome: Ongoing  5/5/2021 1643 by Alfonso Sherman RN  Outcome: Ongoing  Goal: Absence of physical injury  Description: Absence of physical injury  5/5/2021 2035 by Moi Humphreys RN  Outcome: Ongoing  5/5/2021 1643 by Alfonso Sherman RN  Outcome: Ongoing     Problem: Skin Integrity:  Goal: Will show no infection signs and symptoms  Description: Will show no infection signs and symptoms  5/5/2021 2035 by Moi Humphreys RN  Outcome: Ongoing  5/5/2021 1643 by Alfonso Sherman RN  Outcome: Ongoing  Goal: Absence of new skin breakdown  Description: Absence of new skin breakdown  5/5/2021 2035 by Moi Humphreys RN  Outcome: Ongoing  5/5/2021 1643 by Alfonso Sherman RN  Outcome: Ongoing

## 2021-05-06 NOTE — PROGRESS NOTES
Comprehensive Nutrition Assessment    Type and Reason for Visit:  Initial, Positive Nutrition Screen, Consult(+ screen for MST = 2 and N/V; consult for ONS)    Nutrition Recommendations/Plan:   1. Continue general diet order - please document meal intake % in flow sheets for each meal.   2. Added Ensure Compact + Boost pudding with meals - please document ONS acceptance/intake % in flow sheets. 3. Monitor appetite, meal intake, and acceptance/intake of ONS. 4. Please obtain an actual, current weight for this patient - only a stated weight was obtained upon admission. 5. Monitor nutrition-related labs, bowel function, and weight trends. Nutrition Assessment:  patient is severely malnourished AEB decreased appetite/po intake and GI dysfunction PTA and she is at risk for further compromise d/t severe muscle and fat loss, BMI = 11.34, hx of severe malnutrition + FTT, and poor po intake at this time; will continue general diet order and add Ensure Compact + Boost pudding with meals    Malnutrition Assessment:  Malnutrition Status:  Severe malnutrition (severe malnutrition in the context of chronic illness > 3 months based on 75% or less of estimated energy requirements for 1 month or longer, severe fat loss, and severe muscle wasting present during NFPE), per guidelines from Academy of Nutrition and Dietetics (Academy)/American Society for Parenteral and Enteral Nutrition (A.S.P.E.N.) - clinical characteristics that the clinician can  obtain and document to support a diagnosis of malnutrition.    Context:  Chronic Illness     Findings of the 6 clinical characteristics of malnutrition:  Energy Intake:  7 - 75% or less estimated energy requirements for 1 month or longer  Weight Loss:  Unable to assess(admission weight/CBW - stated weight)     Body Fat Loss:  7 - Severe body fat loss Buccal region, Triceps, Orbital   Muscle Mass Loss:  7 - Severe muscle mass loss Hand (interosseous), Scapula (trapezius), Calf (gastrocnemius), Thigh (quadraceps), Clavicles (pectoralis & deltoids), Temples (temporalis)  Fluid Accumulation:  No significant fluid accumulation     Strength:  Not Performed    Estimated Daily Nutrient Needs:  Energy (kcal):  3322 - 1120 kcals based on 38-40 kcals/kg/CBW; Weight Used for Energy Requirements:  Current     Protein (g):  42 - 50 g protein based on 1.5-1.8 g/kg/CBW; Weight Used for Protein Requirements:  Current        Fluid (ml/day):  1064 - 1120 ml; Method Used for Fluid Requirements:  1 ml/kcal      Nutrition Related Findings:  patient was awake and alert upon entering her room this am for initial assessment; patient was very quiet when speaking or mouthed words; she had her breakfast tray on bedside table still and it was untouched; she did have a bag of sour cream and onion chips (bag was opened and some consumed) on bedside table as well; patient's mouth was very dry and she asked for a Pepsi when this RD was in the room; patient did not appear to be in any acute distress but she did seem a little uncomfortable during visit; patient's bony prominences were visible all over her body; patient is from [facility]; she is A & O x 4 but with periods of confusion; she has a hx of dementia, PCM, FTT, gastric ulcer, ETOH abuse, COPD, and dysphagia;  Na and Cl are low; glucose elevated today; patient has solumedrol, seroquel, protonix, vanc in D5, and NS at 75 ml/hr ordered at this time; patient did not provide information but she did answer this RD's questions appropriately; this RD spoke with patient's RN after visit to tell RN that patient's IV infusion was complete and pump was beeping and that patient requested a Pepsi      Wounds:  None       Current Nutrition Therapies:    DIET GENERAL;  Dietary Nutrition Supplements: Low Volume Supplement, Other Oral Supplement (see comment)    Anthropometric Measures:  · Height: 5' 2\" (157.5 cm)  · Current Body Weight: 62 lb (28.1 kg)(obtained on 5/4/21)   · Admission Body Weight: 62 lb (28.1 kg)(stated weight; obtained on 5/4/21)    · Usual Body Weight: 60 lb 1.6 oz (27.3 kg)(obtained on 12/1/20; actual weight)     · Ideal Body Weight: 110 lbs; % Ideal Body Weight 56.4 %   · BMI: 11.3  · BMI Categories: Underweight (BMI less than 22) age over 72       Nutrition Diagnosis:   · Severe malnutrition related to inadequate protein-energy intake, impaired respiratory function, cognitive or neurological impairment, increase demand for energy/nutrients as evidenced by poor intake prior to admission, BMI, intake 0-25%, severe loss of subcutaneous fat, severe muscle loss      Nutrition Interventions:   Food and/or Nutrient Delivery:  Continue Current Diet, Start Oral Nutrition Supplement  Nutrition Education/Counseling:  No recommendation at this time   Coordination of Nutrition Care:  Continue to monitor while inpatient    Goals:  patient will accept and consume at least 50% of her meals on general diet order x 3 meals per day + she will accept and consume 50% or greater of Ensure Compact + Boost pudding with meals       Nutrition Monitoring and Evaluation:   Behavioral-Environmental Outcomes:  Knowledge or Skill   Food/Nutrient Intake Outcomes:  Food and Nutrient Intake, Supplement Intake, IVF Intake  Physical Signs/Symptoms Outcomes:  Biochemical Data, Hemodynamic Status, Nutrition Focused Physical Findings, Skin, Weight, Meal Time Behavior     Discharge Planning:    Continue current diet, Continue Oral Nutrition Supplement     Electronically signed by Jayjay Fleming RD, LD on 5/6/21 at 1:05 PM EDT    Contact: [phone]

## 2021-05-07 VITALS
HEART RATE: 102 BPM | WEIGHT: 62 LBS | OXYGEN SATURATION: 91 % | BODY MASS INDEX: 11.41 KG/M2 | SYSTOLIC BLOOD PRESSURE: 128 MMHG | TEMPERATURE: 98.4 F | RESPIRATION RATE: 18 BRPM | DIASTOLIC BLOOD PRESSURE: 82 MMHG | HEIGHT: 62 IN

## 2021-05-07 PROBLEM — J96.01 ACUTE RESPIRATORY FAILURE WITH HYPOXIA (HCC): Status: RESOLVED | Noted: 2020-12-01 | Resolved: 2021-05-07

## 2021-05-07 LAB
ANION GAP SERPL CALCULATED.3IONS-SCNC: 5 MMOL/L (ref 3–16)
BASOPHILS ABSOLUTE: 0 K/UL (ref 0–0.2)
BASOPHILS RELATIVE PERCENT: 0.2 %
BUN BLDV-MCNC: 3 MG/DL (ref 7–20)
CALCIUM SERPL-MCNC: 8.6 MG/DL (ref 8.3–10.6)
CHLORIDE BLD-SCNC: 95 MMOL/L (ref 99–110)
CO2: 35 MMOL/L (ref 21–32)
CREAT SERPL-MCNC: <0.5 MG/DL (ref 0.6–1.2)
EOSINOPHILS ABSOLUTE: 0 K/UL (ref 0–0.6)
EOSINOPHILS RELATIVE PERCENT: 0.5 %
GFR AFRICAN AMERICAN: >60
GFR NON-AFRICAN AMERICAN: >60
GLUCOSE BLD-MCNC: 161 MG/DL (ref 70–99)
HCT VFR BLD CALC: 33.3 % (ref 36–48)
HEMOGLOBIN: 10.9 G/DL (ref 12–16)
LYMPHOCYTES ABSOLUTE: 0.4 K/UL (ref 1–5.1)
LYMPHOCYTES RELATIVE PERCENT: 7.9 %
MCH RBC QN AUTO: 27 PG (ref 26–34)
MCHC RBC AUTO-ENTMCNC: 32.8 G/DL (ref 31–36)
MCV RBC AUTO: 82.4 FL (ref 80–100)
MONOCYTES ABSOLUTE: 0.3 K/UL (ref 0–1.3)
MONOCYTES RELATIVE PERCENT: 6.5 %
NEUTROPHILS ABSOLUTE: 4.5 K/UL (ref 1.7–7.7)
NEUTROPHILS RELATIVE PERCENT: 84.9 %
PDW BLD-RTO: 14 % (ref 12.4–15.4)
PLATELET # BLD: 212 K/UL (ref 135–450)
PMV BLD AUTO: 7.7 FL (ref 5–10.5)
POTASSIUM REFLEX MAGNESIUM: 4 MMOL/L (ref 3.5–5.1)
RBC # BLD: 4.05 M/UL (ref 4–5.2)
SODIUM BLD-SCNC: 135 MMOL/L (ref 136–145)
WBC # BLD: 5.2 K/UL (ref 4–11)

## 2021-05-07 PROCEDURE — 92610 EVALUATE SWALLOWING FUNCTION: CPT

## 2021-05-07 PROCEDURE — 6370000000 HC RX 637 (ALT 250 FOR IP): Performed by: PHYSICIAN ASSISTANT

## 2021-05-07 PROCEDURE — 2580000003 HC RX 258: Performed by: HOSPITALIST

## 2021-05-07 PROCEDURE — 6360000002 HC RX W HCPCS: Performed by: PHYSICIAN ASSISTANT

## 2021-05-07 PROCEDURE — 85025 COMPLETE CBC W/AUTO DIFF WBC: CPT

## 2021-05-07 PROCEDURE — 6360000002 HC RX W HCPCS: Performed by: HOSPITALIST

## 2021-05-07 PROCEDURE — 6370000000 HC RX 637 (ALT 250 FOR IP): Performed by: INTERNAL MEDICINE

## 2021-05-07 PROCEDURE — 6370000000 HC RX 637 (ALT 250 FOR IP): Performed by: HOSPITALIST

## 2021-05-07 PROCEDURE — 94640 AIRWAY INHALATION TREATMENT: CPT

## 2021-05-07 PROCEDURE — 80048 BASIC METABOLIC PNL TOTAL CA: CPT

## 2021-05-07 PROCEDURE — 99239 HOSP IP/OBS DSCHRG MGMT >30: CPT | Performed by: INTERNAL MEDICINE

## 2021-05-07 PROCEDURE — 94761 N-INVAS EAR/PLS OXIMETRY MLT: CPT

## 2021-05-07 PROCEDURE — 92526 ORAL FUNCTION THERAPY: CPT

## 2021-05-07 PROCEDURE — 36415 COLL VENOUS BLD VENIPUNCTURE: CPT

## 2021-05-07 PROCEDURE — 2700000000 HC OXYGEN THERAPY PER DAY

## 2021-05-07 RX ORDER — LEVOFLOXACIN 750 MG/1
750 TABLET ORAL DAILY
Qty: 5 TABLET | Refills: 0 | DISCHARGE
Start: 2021-05-08 | End: 2021-05-13

## 2021-05-07 RX ORDER — PREDNISONE 20 MG/1
40 TABLET ORAL DAILY
Status: DISCONTINUED | OUTPATIENT
Start: 2021-05-08 | End: 2021-05-07 | Stop reason: HOSPADM

## 2021-05-07 RX ORDER — PREDNISONE 10 MG/1
TABLET ORAL
Qty: 30 TABLET | Refills: 0 | DISCHARGE
Start: 2021-05-08

## 2021-05-07 RX ADMIN — IPRATROPIUM BROMIDE AND ALBUTEROL SULFATE 1 AMPULE: 2.5; .5 SOLUTION RESPIRATORY (INHALATION) at 14:41

## 2021-05-07 RX ADMIN — CEFEPIME 2000 MG: 2 INJECTION, POWDER, FOR SOLUTION INTRAVENOUS at 08:23

## 2021-05-07 RX ADMIN — IPRATROPIUM BROMIDE AND ALBUTEROL SULFATE 1 AMPULE: 2.5; .5 SOLUTION RESPIRATORY (INHALATION) at 03:04

## 2021-05-07 RX ADMIN — SODIUM CHLORIDE: 9 INJECTION, SOLUTION INTRAVENOUS at 05:13

## 2021-05-07 RX ADMIN — PANTOPRAZOLE SODIUM 40 MG: 40 TABLET, DELAYED RELEASE ORAL at 05:13

## 2021-05-07 RX ADMIN — SODIUM CHLORIDE, PRESERVATIVE FREE 10 ML: 5 INJECTION INTRAVENOUS at 08:24

## 2021-05-07 RX ADMIN — ENOXAPARIN SODIUM 40 MG: 40 INJECTION SUBCUTANEOUS at 08:24

## 2021-05-07 RX ADMIN — METHYLPREDNISOLONE SODIUM SUCCINATE 40 MG: 40 INJECTION, POWDER, FOR SOLUTION INTRAMUSCULAR; INTRAVENOUS at 08:24

## 2021-05-07 RX ADMIN — IPRATROPIUM BROMIDE AND ALBUTEROL SULFATE 1 AMPULE: 2.5; .5 SOLUTION RESPIRATORY (INHALATION) at 10:56

## 2021-05-07 RX ADMIN — VANCOMYCIN HYDROCHLORIDE 500 MG: 500 INJECTION, POWDER, LYOPHILIZED, FOR SOLUTION INTRAVENOUS at 09:09

## 2021-05-07 RX ADMIN — QUETIAPINE FUMARATE 12.5 MG: 25 TABLET ORAL at 08:24

## 2021-05-07 RX ADMIN — IPRATROPIUM BROMIDE AND ALBUTEROL SULFATE 1 AMPULE: 2.5; .5 SOLUTION RESPIRATORY (INHALATION) at 06:49

## 2021-05-07 ASSESSMENT — PAIN SCALES - GENERAL: PAINLEVEL_OUTOF10: 0

## 2021-05-07 NOTE — PROGRESS NOTES
Speech Language Pathology  Facility/Department: [facility]   CLINICAL BEDSIDE SWALLOW EVALUATION    NAME: Rj Jaimes  : 1950  MRN: [id number]    ADMISSION DATE: 2021    Visit Diagnoses       Codes    Pneumonia due to organism     J18.9               Past Medical History:  has a past medical history of C. difficile diarrhea, COPD exacerbation ([address] COVID-19, Depression, Dysphagia, ETOH abuse, Failure to thrive, Gastric ulcer, GERD (gastroesophageal reflux disease), and Thyroid disease. Past Surgical History:  has a past surgical history that includes Tubal ligation. Recent Chest Xray/CT of Chest: (2021)  No radiation information found for this patient   Narrative   EXAMINATION:   TWO XRAY VIEWS OF THE CHEST       2021 7:07 pm       COMPARISON:   2021       HISTORY:   ORDERING SYSTEM PROVIDED HISTORY: sob   TECHNOLOGIST PROVIDED HISTORY:   Reason for exam:->sob   Reason for Exam: copugh sob       FINDINGS:   The heart is normal.  The pulmonary vessels are slightly prominent centrally. The lungs are mildly hyperinflated and emphysematous.  There are mild   increased interstitial markings throughout which are more prominent.  There   are hazy parenchymal opacities scattered along the right lung base which is   more apparent.  There are tiny pleural effusions posteriorly which is more   prominent.  No pneumothorax is seen. Rickford Math are multiple chronic fractures   along the left humerus which are unchanged.  There is calcified granuloma   along the right upper lobe which is unchanged.           General  Chart Reviewed: Yes  Comments: Per H&P report \"The patient is a 78 y.o. female with h/o COVID 19 requiring prolonged admission with mechanical ventilation in 2020, PCM, left humerus fx 2018- not repaired, former alcohol abuse who presented to [facility] ED with complaint of shortness of breath from nursing home.   Per nursing home's report the patient does not have a baseline oxygen requirement but was requiring 2 L per nasal cannula, she was having an intermittent cough. \"  Subjective: Pt was awake, alert, and agitated upon ST arrival. Pt was agreeable to the bedside swallow eval.  Breath Sounds: Wet; Inspiratory wheeze; Expiratory wheeze;Scattered wheeze  Communication Observation: Functional  Follows Directions: Simple  Dentition: Some missing teeth;Poor dental/oral hygeine  Patient Positioning: Partially reclined  Baseline Vocal Quality: Wet;Weak;Hoarse  Volitional Cough: Weak;Wet  Prior Dysphagia History: Pt hx stated \"dysphagia\". Consistencies Administered: Reg solid; Thin;Thin - cup;Nectar - cup    Date of Eval: 5/7/2021  Evaluating Therapist: Riky Jackson    Current Diet level:  Current Diet : Regular  Current Liquid Diet : Mildly Thick (Nectar)(Pt was thin prior to ST eval.)    Primary Complaint:   Patient Complaint: \"Sometimes I cough and sometimes I don't\" referring to eating her meals. Pain:   Pain Assessment  Pain Assessment: 0-10  Pain Level: 0  Patient's Stated Pain Goal: No pain    Reason for Referral  Anthony Tran was referred for a bedside swallow evaluation to assess the efficiency of her swallow function, identify signs and symptoms of aspiration, and make recommendations regarding safe dietary consistencies, effective compensatory strategies, and safe eating environment. Impression  Dysphagia Diagnosis  Dysphagia Diagnosis: Moderate oral stage dysphagia  Dysphagia Outcome Severity Scale: Level 4: Mild moderate dysphagia- Intermittent supervision/cueing. One - two diet consistencies restricted    Clinical Impression: Pt was agitated upon ST arrival and minimal PO trials were obtained despite education / max prompts. Pt coughing upon ST arrival. PO trial of thin via cup x1 and immediate and prolonged coughing was observed with wet vocal quality. PO trial of nectar x2 via cup (single sips) with no s/s of penetration / aspiration observed.  Pt agreed the nectar seemed \"to go down better\". PO trial of regular x1 with no s/s of penetration / aspiration and slightly prolonged mastication time; however it was [facility]. Pt refused more trials. ST called the PA and informed of BSE results and recommended regular / nectar. Treatment Plan  Requires SLP Intervention: Yes  Referral To: Dietician  Duration/Frequency of Treatment: 3-5 times a week for 3 weeks. D/C Recommendations: To be determined      Recommended Diet and Intervention  Solid: Diet Solids Recommendation: Regular  Liquid: Liquid Consistency Recommendation: Mildly Thick (Nectar)  Medication:Recommended Form of Meds: Whole with puree  Therapeutic Interventions: Oral care, Tongue base strengthening, Anahi, Mendelsohn, Pharyngeal exercises, Therapeutic PO trials with SLP, Vincent Viera Protocol    Compensatory Swallowing Strategies Attempted  Compensatory Swallowing Strategies: Alternate solids and liquids;Small bites/sips; No straws;Remain upright for 30-45 minutes after meals;Eat/Feed slowly    Treatment/Goals  Long-term Goals  Timeframe for Long-term Goals: Pt will tolerate a diet with no s/s of penetration / aspiration observed or pulmonary compromise using learned swallow strategies. (Goal by 5/28)  Dysphagia Goals:   1)The patient will tolerate recommended diet without observed clinical signs of aspiration; The patient will recall and perform compensatory strategies, with no cues. (Targeted 5/7)  2)The patient will tolerate thin liquids without signs and symptoms of aspiration 10/10 via cup. (Targeted 5/7)       Vision/Hearing  Vision  Vision: Within Functional Limits  Hearing  Hearing: Within functional limits    Oral Motor Deficits  Oral/Motor  Oral Motor: Exceptions to [facility]  Lingual Strength: Reduced    Oral Phase Dysfunction  Oral Phase  Oral Phase: Exceptions  Oral Phase Dysfunction  Decreased Anterior to Posterior Transit: Thin - cup  Suspected Premature Bolus Loss:  Thin - cup     Indicators of Pharyngeal Phase Dysfunction   Pharyngeal Phase  Pharyngeal Phase: Exceptions  Indicators of Pharyngeal Phase Dysfunction  Decreased Laryngeal Elevation: All  Wet Vocal Quality: Thin - cup  Cough - Immediate: Thin - cup    Prognosis  Prognosis  Prognosis for safe diet advancement: good  Barriers to reach goals: cognitive deficits;behavior  Individuals consulted  Consulted and agree with results and recommendations: Patient;RN    Education  Patient Education: Pt was educated on reasoning for referral, role of ST, and assessment results and recommendations.      Patient Education Response: Verbalizes understanding;Needs reinforcement       Therapy Time  SLP Individual Minutes  Time In: 0320  Time Out: 5316  Minutes: 3[facility] [address]   [id number]

## 2021-05-07 NOTE — PROGRESS NOTES
Pharmacy Vancomycin Consult     Vancomycin Day: 3  Current Dosin mg every 24 hours  Current indication: Pneumonia (HAP)    Temp max:  97.9    Recent Labs     21  1900 21  0902   BUN 12 8   CREATININE <0.5* <0.5*   WBC 5.5 2.7*       Intake/Output Summary (Last 24 hours) at 2021 2253  Last data filed at 2021 1842  Gross per 24 hour   Intake 1634 ml   Output 700 ml   Net 934 ml     Culture Date      Source                       Results      Ht Readings from Last 1 Encounters:   21 5' 2\" (1.575 m)        Wt Readings from Last 1 Encounters:   21 62 lb (28.1 kg)       Body mass index is 11.34 kg/m². CrCl cannot be calculated (This lab value cannot be used to calculate CrCl because it is not a number: <0.5). Trough: < 4 mcg/mL    Assessment/Plan:  Increase Vancomycin dose to 500 mg every 12 hours   Next trough ordered for 2030 on 21.     Ina Marquez [facility]

## 2021-05-07 NOTE — PROGRESS NOTES
Am assessment completed patient laying in bed offers no c/o denies pain and discomfort alert oriented with periods of confusion IV fluids infusing at 75ml/hr patient remains on strict bedrest tolerating well

## 2021-05-07 NOTE — PLAN OF CARE
Problem: Nutrition  Intervention: Swallowing evaluation  Note: SLP completed evaluation. Please refer to notes in EMR. Intervention: Aspiration precautions  Note: SLP completed evaluation. Please refer to notes in EMR.        Christin Delgadillo [address] [facility] SLP   [id number]

## 2021-05-07 NOTE — DISCHARGE INSTR - DIET

## 2021-05-07 NOTE — PROGRESS NOTES
Patient is not able to demonstrate the ability to move from a reclining position to an upright position within the recliner due to strict bedrest.

## 2021-05-07 NOTE — DISCHARGE INSTR - COC
Continuity of Care Form    Patient Name: Henrietta Keating   :  1950  MRN:  [id number]    Admit date:  2021  Discharge date:  2021    Code Status Order: Full Code   Advance Directives:   [facility] Documentation       Date/Time Healthcare Directive Type of Healthcare Directive Copy in [facility] [address] Agent's Name Healthcare Agent's Phone Number    21 4053  No, patient does not have an advance directive for healthcare treatment -- -- -- -- --            Admitting Physician:  Adrienne Conway MD  PCP: No primary care provider on file. Discharging Nurse: Deatra Hamman, [address] Unit/Room#: 0201/0201-01  Discharging Unit Phone Number: [phone]    Emergency Contact:   Extended Emergency Contact Information  Primary Emergency Contact: Ton Motta  Address: [address] [place] [address] Phone: [phone]  Mobile Phone: [phone]  Relation: Child  Secondary Emergency Contact: She [address] [facility] Phone: [phone]  Relation: None   needed?  No    Past Surgical History:  Past Surgical History:   Procedure Laterality Date    TUBAL LIGATION         Immunization History:   Immunization History   Administered Date(s) Administered    Influenza Virus Vaccine 2013    Influenza, Quadv, IM, PF (6 mo and older Fluzone, Flulaval, Fluarix, and 3 yrs and older Afluria) 2020    Pneumococcal Polysaccharide ([id number]) 2013       Active Problems:  Patient Active Problem List   Diagnosis Code    Gastric ulcer K25.9    BELKYS (acute kidney injury) ([facility] [address] N17.9    Failure to thrive [id number]    Alcohol abuse F10.10    Dementia ([facility] [address] F03.90    Pulmonary nodules R91.8    Closed fracture of left proximal humerus S42.202A    Closed fracture of left distal humerus S42.402A    Acute metabolic encephalopathy A17.00    Pneumonia due to COVID-19 virus U07.1, J12.82  Acute respiratory failure with hypoxemia ([facility]) J96.01    COVID-19 virus detected U07.1    Hyperkalemia E87.5    Sepsis with acute hypercapnic respiratory failure without septic shock ([facility]) A41.9, R65.20, J96.02    Severe protein-calorie malnutrition Margretta Serve: less than 60% of standard weight) ([facility]) E43    Anorexia R63.0    Acute respiratory failure with hypoxia and hypercapnia ([facility]) J96.01, J96.02    Tachycardia R00.0    Failure to thrive (0-17) R62.51    Acute encephalopathy G93.40    HCAP (healthcare-associated pneumonia) J18.9    COPD exacerbation ([facility]) J44.1    Hyponatremia E87.1       Isolation/Infection:   Isolation            No Isolation          Patient Infection Status       Infection Onset Added Last Indicated Last Indicated By Review Planned Expiration Resolved Resolved By    None active    Resolved    COVID-19 Rule Out 05/04/21 05/04/21 05/04/21 COVID-19, Rapid (Ordered)   05/04/21 Rule-Out Test Resulted    COVID-19 12/01/20 12/01/20 12/01/20 COVID-19   12/21/20 Pratibha Webster RN    COVID-19 Rule Out 12/01/20 12/01/20 12/01/20 COVID-19 (Ordered)   12/01/20 Rule-Out Test Resulted            Nurse Assessment:  Last Vital Signs: /82   Pulse 102   Temp 98.4 °F (36.9 °C) (Oral)   Resp 18   Ht 5' 2\" (1.575 m)   Wt 62 lb (28.1 kg)   SpO2 91%   BMI 11.34 kg/m²     Last documented pain score (0-10 scale): Pain Level: 0  Last Weight:   Wt Readings from Last 1 Encounters:   05/04/21 62 lb (28.1 kg)     Mental Status:  oriented, alert and confused at times    IV Access:  - None    Nursing Mobility/ADLs:  Walking   Assisted  Transfer  Assisted  Bathing  Assisted  Dressing  Assisted  Toileting  Assisted  Feeding  Independent  Med Admin  Assisted  Med Delivery   whole    Wound Care Documentation and Therapy:  Pressure Ulcer 11/13/13 Sacrum Stage I non blanchable redness to sacrum (Active)   Number of days: 2732       Pressure Ulcer 11/13/13 Foot Right Stage I (Active)   Number of days: 0976 Pressure Ulcer 11/13/13 Foot Left Stage I (Active)   Number of days: 5566       Wound 12/19/20 Coccyx dti (Active)   Number of days: 139        Elimination:  Continence:   · Bowel: No  · Bladder: No  Urinary Catheter: None   Colostomy/Ileostomy/Ileal Conduit: No       Date of Last BM: 5/6/21    Intake/Output Summary (Last 24 hours) at 5/7/2021 1540  Last data filed at 5/6/2021 1842  Gross per 24 hour   Intake 60 ml   Output 400 ml   Net -340 ml     I/O last 3 completed shifts: In: 61 [P.O.:60]  Out: 400 [Urine:400]    Safety Concerns: At Risk for Falls    Impairments/Disabilities:      None    Nutrition Therapy:  Current Nutrition Therapy:   - Oral Diet:  General    Routes of Feeding: Oral  Liquids: Nectar Thick Liquids  Daily Fluid Restriction: no  Last Modified Barium Swallow with Video (Video Swallowing Test): not done    Treatments at the Time of Hospital Discharge:   Respiratory Treatments: see medication list   Oxygen Therapy:  is not on home oxygen therapy.   Ventilator:    - No ventilator support    Rehab Therapies: Physical Therapy  Weight Bearing Status/Restrictions: No weight bearing restirctions  Other Medical Equipment (for information only, NOT a DME order):  wheelchair and hospital bed  Other Treatments: none    Patient's personal belongings (please select all that are sent with patient):  None    RN SIGNATURE:  Electronically signed by Sondra Gutierrez RN on 5/7/21 at 6:44 PM EDT    CASE MANAGEMENT/SOCIAL WORK SECTION    Inpatient Status Date: 05/04/2021    Readmission Risk Assessment Score:  Readmission Risk              Risk of Unplanned Readmission:        23           Discharging to Facility/ Agency    Name: [facility]   Address: [address] [place] [address]. [address] Phone: [phone]   Fax: [phone]  ·     Dialysis Facility (if applicable)   · Name:  · Address:  · Dialysis Schedule:  · Phone:  · Fax:    / signature: Electronically signed by Malik Franks RN on 5/7/21 at 3:41 PM EDT    PHYSICIAN SECTION    Prognosis: Good    Condition at Discharge: Stable    Rehab Potential (if transferring to Rehab): Fair    Recommended Labs or Other Treatments After Discharge: Regular diet with nectar thickened liquids     Physician Certification: I certify the above information and transfer of Santhosh Boyce  is necessary for the continuing treatment of the diagnosis listed and that she requires Intermediate Nursing Care for greater 30 days.      Update Admission H&P: No change in H&P    PHYSICIAN SIGNATURE:  Electronically signed by Sara Del Rosario PA-C on 5/7/21 at 3:46 PM EDT

## 2021-05-07 NOTE — DISCHARGE SUMMARY
with exacerbation   - solumedrol 40 mg daily-> prednisone taper on dc   - [facility] clair     #Hyponatremia  -This appears to be chronically low  -Sodium was 133 in January 2021  -Sodium 128 on admission-> 135 after IV NS    #Abnormal EKG  - with inferior ST and T wave changes. Trop neg and she denies CP. Repeated EKG and changes improved    #Severe protein calorie malnutrition  - dietitian cs      #Remote left humerus fracture- 2018  -  never repaired with chronic deformity noted on imaging      #History of COVID-19 pneumonia in December 2020 requiring mechanical ventilation and subsequent discharge to a nursing home     #History of peptic ulcer disease  - Gastric ulcer was noted on EGD in 2013. No PPI on med list.      #Reported history of alcohol abuse  - Now lives in nursing facility    Procedures (Please Review Full Report for Details)  None     Consults    None       Physical Exam at Discharge:    /82   Pulse 102   Temp 98.4 °F (36.9 °C) (Oral)   Resp 18   Ht 5' 2\" (1.575 m)   Wt 62 lb (28.1 kg)   SpO2 91%   BMI 11.34 kg/m²     Gen: Cachectic female who appears chronically ill.  Not in distress. Alert. Eyes: PERRL. No sclera icterus. No conjunctival injection. ENT: No discharge. Pharynx clear. Neck: No JVD.   Trachea midline. Resp: No accessory muscle use. No crackles. No wheezes. No rhonchi. Diminished air entry throughout   CV: Regular rate. Regular rhythm. No murmur.  No rub. No edema. GI: Non-tender. Non-distended. No masses. No organomegaly. Normal bowel sounds. No hernia. Skin: Warm and dry. No nodule on exposed extremities. No rash on exposed extremities. M/S: No cyanosis. No joint deformity. No clubbing. Severe muscle wasting noted in BUE and BLE   Neuro: Awake. Grossly nonfocal    Psych: Oriented to self only.  No anxiety or agitation.      CBC:   Recent Labs     05/04/21  1900 05/06/21  0902 05/07/21  1005   WBC 5.5 2.7* 5.2   HGB 12.3 10.6* 10.9*   HCT 37.5 32.7* 33.3*

## 2021-05-07 NOTE — PLAN OF CARE
Problem: Falls - Risk of:  Goal: Will remain free from falls  Description: Will remain free from falls  Outcome: Ongoing  Goal: Absence of physical injury  Description: Absence of physical injury  Outcome: Ongoing     Problem: Skin Integrity:  Goal: Will show no infection signs and symptoms  Description: Will show no infection signs and symptoms  Outcome: Ongoing  Goal: Absence of new skin breakdown  Description: Absence of new skin breakdown  Outcome: Ongoing     Problem: Nutrition  Goal: Optimal nutrition therapy  Outcome: Ongoing  Goal: Understanding of nutritional guidelines  Outcome: Ongoing

## 2021-05-07 NOTE — FLOWSHEET NOTE
05/07/21 1435   Encounter Summary   Services provided to: Patient   Referral/Consult From: Rounding   Continue Visiting   (5-7, initial, no needs)   Complexity of Encounter Low   Length of Encounter 15 minutes   Routine   Type Initial   Assessment Calm; Approachable  (conversational dyspnea)   Intervention Active listening;Nurtured hope   Outcome Engaged in conversation;Receptive   No spiritual needs. PRN follow-up.

## 2021-05-07 NOTE — PROGRESS NOTES
Bedside Mobility Assessment Tool (BMAT):     Assessment Level 1- Sit and Shake    1. From a semi-reclined position, ask patient to sit up and rotate to a seated position at the side of the bed. Can use the bedrail. 2. Ask patient to reach out and grab your hand and shake making sure patient reaches across his/her midline. Pass- Patient is able to come to a seated position, maintain core strength. Maintains seated balance while reaching across midline. Move on to Assessment Level 2. Assessment Level 2- Stretch and Point   1. With patient in seated position at the side of the bed, have patient place both feet on the floor (or stool) with knees no higher than hips. 2. Ask patient to stretch one leg and straighten the knee, then bend the ankle/flex and point the toes. If appropriate, repeat with the other leg. Pass- Patient is able to demonstrate appropriate quad strength on intended weight bearing limb(s). Move onto Assessment Level 3. Assessment Level 3- Stand   1. Ask patient to elevate off the bed or chair (seated to standing) using an assistive device (cane, bedrail). 2. Patient should be able to raise buttocks off be and hold for a count of five. May repeat once. Fail- Patient unable to demonstrate standing stability. Patient is MOBILITY LEVEL 3. Assessment Level 4- Walk   1. Ask patient to march in place at bedside. 2. Then ask patient to advance step and return each foot. Some medical conditions may render a patient from stepping backwards, use your best clinical judgement. Fail- Patient not able to complete tasks OR requires use of assistive device. Patient is MOBILITY LEVEL 3.        Mobility Level- 2   Patient is on strict bedrest did not perform all activities

## 2021-05-07 NOTE — PROGRESS NOTES
Report called to [facility] patient to be transported via stretcher at [address] being sent with patient

## 2021-05-08 LAB
BLOOD CULTURE, ROUTINE: NORMAL
CULTURE, BLOOD 2: NORMAL

## 2024-04-15 NOTE — ED PROVIDER NOTES
[address] ED  EMERGENCY DEPARTMENT ENCOUNTER        Pt Name: Leary Peabody  MRN: [id number]  [place] 1950  Date of evaluation: 1/1/2021  Provider: TODD Crain CNP  PCP: No primary care provider on file. ED Attending: No att. providers found    [facility]       Chief Complaint   Patient presents with    Arm Pain     feel on Dec 29       HISTORY OF PRESENT ILLNESS   (Location/Symptom, Timing/Onset, Context/Setting, Quality, Duration, Modifying Factors, Severity)  Note limiting factors. Leary Peabody is a 78 y.o. female for left sided arm pain. Onset was unsure. Context includes pt reported from nursing home and fell on 12-29. The nursing home reports they did an xray but it was not looked at until today and there is a fracture. Pt reports she lives at home with her son and she fell in the past. She reports her arm occasionally hurts but worse in certain weather. Alleviating factors include nothing. Aggravating factors include nothing. Pain is 510. nothing has been used for pain today. Nursing Notes were all reviewed and agreed with or any disagreements were addressed  in the HPI. REVIEW OF SYSTEMS  (2-9 systems for level 4, 10 or more for level 5)     Review of Systems   Constitutional: Negative for fever. HENT: Negative for congestion, rhinorrhea and sore throat. Respiratory: Negative for shortness of breath. Cardiovascular: Negative for chest pain. Gastrointestinal: Negative for abdominal pain. Genitourinary: Negative for decreased urine volume and difficulty urinating. Musculoskeletal: Negative for arthralgias and myalgias. Left arm pain   Skin: Negative for color change and rash. Neurological: Negative for dizziness and light-headedness. Psychiatric/Behavioral: Negative for agitation. All other systems reviewed and are negative. Physical Therapy Daily Treatment Note  [facility] [place]   [address] [place] [address]  P: [phone] F: [phone]    Patient: Keri Bhagat   : 1960  Referring practitioner: DAPHNEY Choe  Date of Initial Visit: Type: THERAPY  Noted: 3/19/2024  Today's Date: 2024  Patient seen for 10 sessions       Visit Diagnoses:    ICD-10-CM ICD-9-CM   1. S/P TKR (total knee replacement), left  Z96.652 V43.65   2. Impaired mobility and endurance  Z74.09 V49.89   3. Status post total left knee replacement  Z96.652 V43.65         Subjective:  Keri Bhagat reports: Knee is hurting a bit today. Pt is busy trying to sell home and has been on her feet more than usual lately. She states that her knee feels straight when extended but is difficult to bend.      Objective          Ambulation     Ambulation: Level Surfaces     Additional Level Surfaces Ambulation Details  Pt presents to clinic w/ cane, but uses infrequently and is confident w/ no AD for distances up to 50'.    Gait speed is slow but improving      See Exercise, Manual, and Modality Logs for complete treatment.       Assessment:  Pt responded well to treatment today based on observation during exercise. She is confident in ambulation w/o AD and endurance is improving. Knee flexion > than 110 degrees is difficult.      Plan:   Continue to monitor and progress.          Timed:         Manual Therapy:    10     mins  73072;     Therapeutic Exercise:    15     mins  34954;     Neuromuscular Zelda:    15    mins  75071;    Therapeutic Activity:     15     mins  41055;     Ultrasound:     0     mins  28006;    Ionto                               0    mins  29419    Un-Timed:  Electrical Stimulation:    0     mins  60099 (MC );  Traction     0     mins 68706        Timed Treatment:   55   mins   Total Treatment:     55   mins    Derrick Diana PT  [place] License #: [id number]    Physical Therapist       Positivesand Pertinent negatives as per HPI. Except as noted above in the ROS, all other systems were reviewed and negative. PAST MEDICAL HISTORY     Past Medical History:   Diagnosis Date    C. difficile diarrhea     COVID-19 12/01/2020    Depression     Dysphagia     ETOH abuse     May not have had a drink in a day or so.  Failure to thrive     Gastric ulcer     GERD (gastroesophageal reflux disease)     Thyroid disease          SURGICAL HISTORY       Past Surgical History:   Procedure Laterality Date    TUBAL LIGATION           CURRENT MEDICATIONS       Discharge Medication List as of 1/1/2021  2:16 PM      CONTINUE these medications which have NOT CHANGED    Details   QUEtiapine (SEROQUEL) 25 MG tablet Take 0.5 tablets by mouth 2 times daily, Disp-30 tablet, R-0NO PRINT      dexamethasone (DECADRON) 0.5 MG tablet 2 qd- 3 days, 1 qd- 4 days, Disp-6 tablet, R-0NO PRINT               ALLERGIES     No known allergies    FAMILY HISTORY       Family History   Problem Relation Age of Onset    Diabetes Mother     Heart Disease Father          SOCIAL HISTORY       Social History     Socioeconomic History    Marital status:      Spouse name: Not on file    Number of children: Not on file    Years of education: Not on file    Highest education level: Not on file   Occupational History    Not on file   Social Needs    Financial resource strain: Not on file    Food insecurity     Worry: Not on file     Inability: Not on file    Transportation needs     Medical: Not on file     Non-medical: Not on file   Tobacco Use    Smoking status: Current Every Day Smoker     Packs/day: 1.00     Years: 45.00     Pack years: 45.00     Types: Cigarettes    Smokeless tobacco: Never Used   Substance and Sexual Activity    Alcohol use:  Yes     Alcohol/week: 6.0 standard drinks     Types: 6 Shots of liquor per week    Drug use: No    Sexual activity: Not on file Comment: drinks 2-3 short glasses/day   Lifestyle    Physical activity     Days per week: Not on file     Minutes per session: Not on file    Stress: Not on file   Relationships    Social connections     Talks on phone: Not on file     Gets together: Not on file     Attends [employer] service: Not on file     Active member of club or organization: Not on file     Attends meetings of clubs or organizations: Not on file     Relationship status: Not on file    Intimate partner violence     Fear of current or ex partner: Not on file     Emotionally abused: Not on file     Physically abused: Not on file     Forced sexual activity: Not on file   Other Topics Concern    Not on file   Social History Narrative    Not on file       SCREENINGS    Mira Coma Scale  Eye Opening: Spontaneous  Best Verbal Response: Confused  Best Motor Response: Obeys commands  Brice Coma Scale Score: 14        PHYSICAL EXAM    (up to 7 for level 4, 8 ormore for level 5)     ED Triage Vitals [01/01/21 0807]   BP Temp Temp Source Pulse Resp SpO2 Height Weight   136/61 98.7 °F (37.1 °C) Oral 59 18 99 % 5' 2\" (1.575 m) 60 lb (27.2 kg)       Physical Exam  Constitutional:       Appearance: She is underweight. HENT:      Head: Normocephalic and atraumatic. Neck:      Musculoskeletal: Normal range of motion. Cardiovascular:      Rate and Rhythm: Normal rate. Pulmonary:      Effort: Pulmonary effort is normal. No respiratory distress. Abdominal:      General: There is no distension. Palpations: Abdomen is soft. Tenderness: There is no abdominal tenderness. Musculoskeletal:         General: Swelling, tenderness, deformity and signs of injury present. Arms:       Comments: Decreased range of motion to left the pain elicited with movement. Sensation strength and pulses intact to left hand. Diffuse tenderness noted     Skin:     General: Skin is warm and dry. Neurological:      Mental Status: She is alert. Comments: Oriented to person only         DIAGNOSTIC RESULTS   LABS:    Labs Reviewed   CBC WITH AUTO DIFFERENTIAL - Abnormal; Notable for the following components:       Result Value    WBC 3.9 (*)     Hemoglobin 11.4 (*)     Hematocrit 35.5 (*)     RDW 29.6 (*)     Anisocytosis 2+ (*)     All other components within normal limits    Narrative:     Performed at:  [facility],  [place], [place] [facility]   Phone [phone]   COMPREHENSIVE METABOLIC PANEL W/ REFLEX TO MG FOR LOW K - Abnormal; Notable for the following components:    Sodium 133 (*)     Chloride 96 (*)     BUN 6 (*)     CREATININE <0.5 (*)     Total Protein 6.2 (*)     Alb 3.3 (*)     All other components within normal limits    Narrative:     Performed at:  [facility]) - [place] [address],  [place], [employer]   Phone [phone]   BRAIN NATRIURETIC PEPTIDE - Abnormal; Notable for the following components:    Pro- (*)     All other components within normal limits    Narrative:     Performed at:  [place] [address],  [place], [place] [facility]   Phone [phone]   URINE RT REFLEX TO CULTURE    Narrative:     Performed at:  [place] [address],  [place], [place] [employer]   Phone [phone]   TROPONIN    Narrative:     Performed at:  [place] [address],  [place], [employer]   Phone [phone]       All other labs were within normal range or not returned as of this dictation. EKG: All EKG's are interpreted by the Emergency Department Physician who either signs or Co-signs this chart in the absence of a cardiologist.  Please see their note for interpretation of EKG.       RADIOLOGY:   Left humerus x-ray interpreted by radiologist for  FINDINGS:   Osteopenia.  Fracture of the humeral neck is noted, which appears more Patient was seen and evaluated by myself. Per nursing report the patient was brought in after she fell a few days ago. Patient reports that she had an x-ray to the left arm which the nursing home reviewed today and found that she has a broken arm. Patient denies any history of fall. She states that she broke her arm in the past and did not have any recent injuries. She reports that she does have some intermittent pain to her left arm but depends mostly on the weather. Patient is alert to person however otherwise cannot provide any other additional information. On exam she is awake and alert hemodynamically stable nontoxic in appearance. She is cachectic and very thin and underweight. Lab values have been reviewed and interpreted. Left x-humerus x-ray was concerning for previously demonstrated unfused humeral neck fracture which appears more displaced. It also was concerning for an old displaced angulated fracture of the distal humerus with osseous bridging. Patient is neurovascular intact her left arm. Consult was placed to the orthopedist.  Orthopedic states that the patient could be placed in a sling and followed up on an outpatient basis. Patient was given a dose of pain medications in the ED. She was sling and swath. She was given orthopedic referral.  She was encouraged to return to the ED for worsening symptoms. She was also encouraged to follow-up with the PCP referral provided to her. Patient was ultimately discharged home with all questions answered. The patient tolerated their visit well. They were seen and evaluated by the attending physician, No att. providers found who agreed with the assessment and plan. The patient and / or the family were informed of the results of any tests, a time was given to answer questions, a plan was proposed and they agreed with plan.         FINAL IMPRESSION 1. Other closed displaced fracture of distal end of left humerus, initial encounter    2. Left arm pain    3.  Closed fracture of neck of left humerus, initial encounter          DISPOSITION/PLAN   DISPOSITION Decision To Discharge 01/01/2021 02:45:45 PM      PATIENT REFERRED TO:  [facility]) Pre-Services  [phone]  Schedule an appointment as soon as possible for a visit in 1 week  to establish primary care    [facility] ED  [address] [facility]  [phone]    If symptoms worsen    [address]  [phone]  Schedule an appointment as soon as possible for a visit in 3 days  for re-evaluation      DISCHARGE MEDICATIONS:  Discharge Medication List as of 1/1/2021  2:16 PM          DISCONTINUED MEDICATIONS:  Discharge Medication List as of 1/1/2021  2:16 PM                 (Please note that portions of this note were completed with a voice recognition program.  Efforts were made to edit the dictations but occasionally words are mis-transcribed.)    TODD Lance CNP (electronically signed)       TODD Lance CNP  01/01/21 1510